# Patient Record
Sex: MALE | Race: BLACK OR AFRICAN AMERICAN | Employment: OTHER | URBAN - METROPOLITAN AREA
[De-identification: names, ages, dates, MRNs, and addresses within clinical notes are randomized per-mention and may not be internally consistent; named-entity substitution may affect disease eponyms.]

---

## 2018-03-28 ENCOUNTER — OFFICE VISIT (OUTPATIENT)
Dept: FAMILY MEDICINE CLINIC | Age: 83
End: 2018-03-28

## 2018-03-28 VITALS
SYSTOLIC BLOOD PRESSURE: 129 MMHG | HEART RATE: 69 BPM | WEIGHT: 199 LBS | RESPIRATION RATE: 17 BRPM | HEIGHT: 72 IN | BODY MASS INDEX: 26.95 KG/M2 | DIASTOLIC BLOOD PRESSURE: 78 MMHG | TEMPERATURE: 96.8 F

## 2018-03-28 DIAGNOSIS — B88.8 INFESTATION BY BED BUG: ICD-10-CM

## 2018-03-28 DIAGNOSIS — I10 ESSENTIAL HYPERTENSION: ICD-10-CM

## 2018-03-28 DIAGNOSIS — R60.0 BILATERAL LOWER EXTREMITY EDEMA: Primary | ICD-10-CM

## 2018-03-28 DIAGNOSIS — K21.9 GASTROESOPHAGEAL REFLUX DISEASE, ESOPHAGITIS PRESENCE NOT SPECIFIED: ICD-10-CM

## 2018-03-28 DIAGNOSIS — E78.5 HYPERLIPIDEMIA, UNSPECIFIED HYPERLIPIDEMIA TYPE: ICD-10-CM

## 2018-03-28 DIAGNOSIS — E03.9 HYPOTHYROIDISM, UNSPECIFIED TYPE: ICD-10-CM

## 2018-03-28 DIAGNOSIS — J44.9 CHRONIC OBSTRUCTIVE PULMONARY DISEASE, UNSPECIFIED COPD TYPE (HCC): ICD-10-CM

## 2018-03-28 DIAGNOSIS — N40.1 BENIGN PROSTATIC HYPERPLASIA WITH URINARY FREQUENCY: ICD-10-CM

## 2018-03-28 DIAGNOSIS — R35.0 BENIGN PROSTATIC HYPERPLASIA WITH URINARY FREQUENCY: ICD-10-CM

## 2018-03-28 RX ORDER — ATORVASTATIN CALCIUM 40 MG/1
TABLET, FILM COATED ORAL DAILY
COMMUNITY
End: 2018-03-28 | Stop reason: SDUPTHER

## 2018-03-28 RX ORDER — ATENOLOL 25 MG/1
25 TABLET ORAL DAILY
Qty: 90 TAB | Refills: 1 | Status: SHIPPED | OUTPATIENT
Start: 2018-03-28 | End: 2018-06-21 | Stop reason: SDUPTHER

## 2018-03-28 RX ORDER — FINASTERIDE 5 MG/1
5 TABLET, FILM COATED ORAL DAILY
Qty: 90 TAB | Refills: 1 | Status: SHIPPED | OUTPATIENT
Start: 2018-03-28 | End: 2018-06-21 | Stop reason: SDUPTHER

## 2018-03-28 RX ORDER — ATORVASTATIN CALCIUM 40 MG/1
40 TABLET, FILM COATED ORAL DAILY
Qty: 90 TAB | Refills: 1 | Status: SHIPPED | OUTPATIENT
Start: 2018-03-28 | End: 2018-10-11 | Stop reason: SDUPTHER

## 2018-03-28 RX ORDER — IPRATROPIUM BROMIDE AND ALBUTEROL SULFATE 2.5; .5 MG/3ML; MG/3ML
3 SOLUTION RESPIRATORY (INHALATION)
COMMUNITY
End: 2018-03-28 | Stop reason: SDUPTHER

## 2018-03-28 RX ORDER — FLUTICASONE PROPIONATE AND SALMETEROL 500; 50 UG/1; UG/1
1 POWDER RESPIRATORY (INHALATION) EVERY 12 HOURS
Qty: 60 EACH | Refills: 3 | Status: SHIPPED | OUTPATIENT
Start: 2018-03-28 | End: 2021-08-30 | Stop reason: SDUPTHER

## 2018-03-28 RX ORDER — OMEPRAZOLE 20 MG/1
20 CAPSULE, DELAYED RELEASE ORAL DAILY
Qty: 90 CAP | Refills: 1 | Status: SHIPPED | OUTPATIENT
Start: 2018-03-28 | End: 2018-09-19 | Stop reason: SDUPTHER

## 2018-03-28 RX ORDER — IPRATROPIUM BROMIDE AND ALBUTEROL SULFATE 2.5; .5 MG/3ML; MG/3ML
3 SOLUTION RESPIRATORY (INHALATION)
Qty: 30 NEBULE | Refills: 12 | Status: SHIPPED | OUTPATIENT
Start: 2018-03-28 | End: 2019-05-16 | Stop reason: SDUPTHER

## 2018-03-28 RX ORDER — ACETAMINOPHEN 500 MG
1000 TABLET ORAL
Qty: 90 TAB | Refills: 1 | Status: SHIPPED | OUTPATIENT
Start: 2018-03-28 | End: 2018-05-29 | Stop reason: SDUPTHER

## 2018-03-28 RX ORDER — WARFARIN 2 MG/1
6 TABLET ORAL DAILY
COMMUNITY
End: 2018-05-02 | Stop reason: SDUPTHER

## 2018-03-28 RX ORDER — LEVOTHYROXINE SODIUM 50 UG/1
50 TABLET ORAL
Qty: 90 TAB | Refills: 1 | Status: SHIPPED | OUTPATIENT
Start: 2018-03-28 | End: 2018-06-21 | Stop reason: SDUPTHER

## 2018-03-28 RX ORDER — ATENOLOL 25 MG/1
25 TABLET ORAL DAILY
COMMUNITY
End: 2018-03-28 | Stop reason: SDUPTHER

## 2018-03-28 NOTE — PROGRESS NOTES
[facility]    CC: LE edema    HPI:   History complicated by patient being a poor historian. Patient reports that normally his daughter comes with him, as he has difficulty remembering things. LE Edema:  - Timing/onset: Started around 4-5 years  - Duration: Constant  - Location: Both feet and ankles  - Associated Symptoms/signs: Numbness in feet  - Notable pertinent PMH of COPD and DVT    Bed Bugs:  - Patientdenied having bed bugs at his house  - Surprised to find out he had some currently on him  - Stated that he could not see them  - Tape used to pick one up and showed it to him so he could get a better look  - Denies any bites or lesions. Athough complains of pain at multiple locations (Neck, back, leg, and foot)    ROS: Positive items marked in RED  CON: fever, chills  Eyes: eye pain, glasses  ENT: HA, ear pain, sore throat  Cardiovascular: palpitations, CP  Resp: cough, SOB  GI: nausea, vomiting, diarrhea  SKIN: rash, itching  : dysuria, hematuria  MS: arthralgias, myalgias  Neuro: numbness (feet),  weakness  Psych: depression, anxiety      Past Medical History:   Diagnosis Date    BPH (benign prostatic hyperplasia)     Chronic obstructive pulmonary disease (HCC)     Hyperlipemia     Hypertension     IBS (irritable bowel syndrome)     Thromboembolus (HCC)     lower extremity    Thyroid disease        History reviewed. No pertinent surgical history. History reviewed. No pertinent family history.     Social History     Social History    Marital status:      Spouse name: N/A    Number of children: N/A    Years of education: N/A     Social History Main Topics    Smoking status: Former Smoker    Smokeless tobacco: Never Used    Alcohol use No    Drug use: No    Sexual activity: Not Asked     Other Topics Concern    None     Social History Narrative       No Known Allergies      Current Outpatient Prescriptions:     OMEPRAZOLE PO, Take  by mouth., Disp: , Rfl:    atenolol (TENORMIN) 50 mg tablet, Take 50 mg by mouth daily. , Disp: , Rfl:     amLODIPine (NORVASC) 2.5 mg tablet, Take 2.5 mg by mouth daily. , Disp: , Rfl:     simvastatin (ZOCOR) 20 mg tablet, Take 20 mg by mouth nightly., Disp: , Rfl:     finasteride (PROSCAR) 5 mg tablet, Take 5 mg by mouth daily. , Disp: , Rfl:     levothyroxine (SYNTHROID) 50 mcg tablet, Take 50 mcg by mouth Daily (before breakfast). , Disp: , Rfl:     warfarin (COUMADIN) 5 mg tablet, Take 5 mg by mouth daily. , Disp: , Rfl:     fluticasone-salmeterol (ADVAIR) 500-50 mcg/dose diskus inhaler, Take 1 Puff by inhalation every twelve (12) hours. , Disp: , Rfl:     albuterol (PROVENTIL VENTOLIN) 2.5 mg /3 mL (0.083 %) nebulizer solution, by Nebulization route once., Disp: , Rfl:     albuterol (PROVENTIL HFA, VENTOLIN HFA, PROAIR HFA) 90 mcg/actuation inhaler, Take 2 Puffs by inhalation every four (4) hours as needed for Wheezing., Disp: , Rfl:     traMADol (ULTRAM) 50 mg tablet, Take 50 mg by mouth every six (6) hours as needed for Pain., Disp: , Rfl:     lansoprazole (PREVACID) 15 mg capsule, Take 15 mg by mouth Daily (before breakfast). , Disp: , Rfl:     Physical Exam:      /78  Pulse 69  Temp 96.8 °F (36 °C) (Oral)   Resp 17  Ht 5' 11.5\" (1.816 m)  Wt 199 lb (90.3 kg)  BMI 27.37 kg/m2    General:  WD, WN, NAD  Eyes: sclera clear bilaterally, no discharge noted, eyelids normal in appearance  HENT: NCAT, nasal turbinates, oropharynx clear, MMM  Neck: supple, no lymphadenopathy  Lungs: CTAB, Normal respiratory effort and rate  CV: RRR, no MRGs  ABD: soft, non-tender, non-distended, normal bowel sounds  Ext: 2+ pitting edema at ankles. Patient noted to have numerous bed bugs crawling on right leg. Bed bugs appeared to be originating from shoe. Skin: normal temperature, turgor, color, and texture. No obvious bites noted on his skin.   Psych: alert and oriented to person, place and time, normal affect  Neuro: Speech normal, Moving all extremities, gait normal      Assessment/Plan   Visit cut short due to bed bug infestation. Patient's medications for his chronic medical conditions were filled, as he was running out. Will need to discuss his chronic conditions at his follow up visit. Patient to return for INR check in 2 weeks. Bilateral LE edema:  - Etiology unclear.  Patient is notably a poor historian.  - Possible etiologies inclued pulmonary HTN, cor pulmonale, and heart failure  - Will start work up with CBC, NT-pro BNP, echocardiogram, and CMP  - Follow up to be determined      Bed Bug Infestation:  - Patient advised to have a professional  come to his house and treat it for bed bugs  - Patient given handout on bed bugs      Kaci Guerin MD  3/28/2018, 1:52 PM

## 2018-03-28 NOTE — MR AVS SNAPSHOT
Vinayak Frausto [place] [address] [facility] [address] [facility] [address] [id number] 
[phone] Patient: Yelena Adams MRN: [id number] Visit Information Date & Time Provider Department Dept. Phone Encounter #  
 3/28/2018  1:30 PM Mary Quevedo, [address] [place] [address] [phone] [id number] Follow-up Instructions Return in about 2 weeks (around 4/11/2018) for INR Clinic. Upcoming Health Maintenance Date Due DTaP/Tdap/Td series (1 - Tdap) 6/2/1954 ZOSTER VACCINE AGE 60> 4/2/1993 GLAUCOMA SCREENING Q2Y 6/2/1998 Pneumococcal 65+ Low/Medium Risk (1 of 2 - PCV13) 6/2/1998 Influenza Age 5 to Adult 8/1/2017 Allergies as of 3/28/2018  Review Complete On: 9/8/2016 By: Catrina Paz. Alee, RN No Known Allergies Current Immunizations  Reviewed on 3/28/2018 Name Date Influenza Vaccine 10/1/2014 12:00 AM, 9/16/2013 12:00 AM, 10/18/2012 12:00 AM  
 Pneumococcal Polysaccharide (PPSV-23) 10/18/2009 12:00 AM  
  
 Reviewed by Celi Rod LPN on 5/29/1667 at  1:46 PM  
 Reviewed by Celi Rod LPN on 4/28/5976 at  1:46 PM  
You Were Diagnosed With   
  
 Codes Comments Bilateral lower extremity edema    -  Primary ICD-10-CM: R60.0 ICD-9-CM: 758. 3 Chronic obstructive pulmonary disease, unspecified COPD type ([facility] [place] [facility].)     ICD-10-CM: J44.9 ICD-9-CM: 327 Essential hypertension     ICD-10-CM: I10 
ICD-9-CM: 401.9 Hyperlipidemia, unspecified hyperlipidemia type     ICD-10-CM: E78.5 ICD-9-CM: 272.4 Benign prostatic hyperplasia with urinary frequency     ICD-10-CM: N40.1, R35.0 ICD-9-CM: 600.01, 788.41 Hypothyroidism, unspecified type     ICD-10-CM: E03.9 ICD-9-CM: 244.9 Gastroesophageal reflux disease, esophagitis presence not specified     ICD-10-CM: K21.9 ICD-9-CM: 530.81 Vitals BP Pulse Temp Resp Height(growth percentile) Weight(growth percentile) 129/78 69 96.8 °F (36 °C) (Oral) 17 5' 11.5\" (1.816 m) 199 lb (90.3 kg) BMI Smoking Status 27.37 kg/m2 Former Smoker Vitals History BMI and BSA Data Body Mass Index Body Surface Area  
 27.37 kg/m 2 2.13 m 2 Preferred Pharmacy Pharmacy Name Phone [id number] - [place], [address] [facility] [address] [phone] Your Updated Medication List  
  
   
This list is accurate as of 3/28/18  2:49 PM.  Always use your most recent med list.  
  
  
  
  
 acetaminophen 500 mg tablet Commonly known as:  TYLENOL Take 2 Tabs by mouth every six (6) hours as needed for Pain. albuterol-ipratropium 2.5 mg-0.5 mg/3 ml Nebu Commonly known as:  DUO-NEB  
3 mL by Nebulization route every six (6) hours as needed. atenolol 25 mg tablet Commonly known as:  TENORMIN Take 1 Tab by mouth daily. atorvastatin 40 mg tablet Commonly known as:  LIPITOR Take 1 Tab by mouth daily. finasteride 5 mg tablet Commonly known as:  PROSCAR Take 1 Tab by mouth daily. fluticasone-salmeterol 500-50 mcg/dose diskus inhaler Commonly known as:  ADVAIR Take 1 Puff by inhalation every twelve (12) hours. levothyroxine 50 mcg tablet Commonly known as:  SYNTHROID Take 1 Tab by mouth Daily (before breakfast). omeprazole 20 mg capsule Commonly known as:  PRILOSEC Take 1 Cap by mouth daily. traMADol 50 mg tablet Commonly known as:  ULTRAM  
Take 50 mg by mouth every six (6) hours as needed for Pain.  
  
 warfarin 2 mg tablet Commonly known as:  COUMADIN Take 6 mg by mouth daily. Prescriptions Sent to Pharmacy Refills  
 albuterol-ipratropium (DUO-NEB) 2.5 mg-0.5 mg/3 ml nebu 12 Sig: 3 mL by Nebulization route every six (6) hours as needed. Class: Normal  
 Pharmacy: [facility], [address] [facility] [address]  #: [phone] Route: Nebulization atenolol (TENORMIN) 25 mg tablet 1 Sig: Take 1 Tab by mouth daily. Class: Normal  
 Pharmacy: [address] Ph #: [phone] Route: Oral  
 atorvastatin (LIPITOR) 40 mg tablet 1 Sig: Take 1 Tab by mouth daily. Class: Normal  
 Pharmacy: [address] Ph #: [phone] Route: Oral  
 finasteride (PROSCAR) 5 mg tablet 1 Sig: Take 1 Tab by mouth daily. Class: Normal  
 Pharmacy: [address] Ph #: [phone] Route: Oral  
 fluticasone-salmeterol (ADVAIR) 500-50 mcg/dose diskus inhaler 3 Sig: Take 1 Puff by inhalation every twelve (12) hours. Class: Normal  
 Pharmacy: [address] Ph #: [phone] Route: Inhalation  
 levothyroxine (SYNTHROID) 50 mcg tablet 1 Sig: Take 1 Tab by mouth Daily (before breakfast). Class: Normal  
 Pharmacy: [address] Ph #: [phone] Route: Oral  
 omeprazole (PRILOSEC) 20 mg capsule 1 Sig: Take 1 Cap by mouth daily. Class: Normal  
 Pharmacy: [address] Ph #: [phone] Route: Oral  
 acetaminophen (TYLENOL) 500 mg tablet 1 Sig: Take 2 Tabs by mouth every six (6) hours as needed for Pain. Class: Normal  
 Pharmacy: [address] Ph #: [phone] Route: Oral  
  
Follow-up Instructions Return in about 2 weeks (around 4/11/2018) for INR Clinic. To-Do List   
 03/28/2018 ECHO:  2D ECHO COMPLETE ADULT (TTE) W OR WO CONTR   
  
 03/28/2018 Lab:  CBC WITH AUTOMATED DIFF   
  
 03/28/2018 Lab:  METABOLIC PANEL, COMPREHENSIVE   
  
 03/28/2018 Lab:  NT-PRO BNP Patient Instructions Bedbugs: Care Instructions Your Care Instructions Bedbugs are tiny bugs that feed on blood from animals or people. They have that name because they like to hide in bedding and mattresses. Bedbugs are not known to spread disease to people. But itching from the bites can be so bad that you may scratch hard enough to break the skin. That can lead to infection. The bites can also cause an allergic reaction in some people. The bugs can spread into cracks and crevices in the room and lay their eggs in anything that is in the room, including clothing and furniture. This makes them very hard to kill. Getting rid of bedbugs The best way to get rid of bedbugs is to call a professional pest control company. They use special pesticides and other equipment to kill the bugs and their eggs. If you decide to buy your own pesticide, check the label. Make sure it says that it is for bedbugs. Be sure to follow the directions carefully. You may have to use the pesticide more than once. Do other cleaning steps such as: · Vacuum often. Be sure to empty the vacuum after each use. If you use a vacuum bag, seal it and throw it out in an outdoor trash can. If you don't use a vacuum bag, empty the container and clean it with hot, soapy water. · Launder things that might hide bugs. Washing and then drying items in a dryer on a hot setting is adequate to kill bedbugs in clothing or linens. Turn the dryer to the hottest setting that the fabric can handle. · Use mattress, box spring, and pillow (encasement) sacks to trap bed bugs and help get rid of them. Be sure to follow the directions on the package. After your mattress has been cleared of bedbugs, you can buy a special mattress cover that is made to keep bedbugs out of your mattress. Follow-up care is a key part of your treatment and safety. Be sure to make and go to all appointments, and call your doctor if you are having problems. It's also a good idea to know your test results and keep a list of the medicines you take. How can you care for yourself at home? · Wash the bites with soap to lower the chance of infection. Try not to scratch. · Use calamine lotion or an anti-itch cream to stop the itching. You can also hold an oatmeal-soaked washcloth on the itchy area for 15 minutes. You can buy an oatmeal powder, such as Aveeno Colloidal Oatmeal, in drugstores. · Use an ice pack to stop the swelling. When should you call for help? Call your doctor now or seek immediate medical care if: 
? · You have signs of infection, such as: 
¨ Increased pain, swelling, warmth, or redness. ¨ Red streaks leading from a bite area. ¨ Pus draining from a bite area. ¨ A fever. ? Watch closely for changes in your health, and be sure to contact your doctor if: 
? · Anyone else in your family has itching. ? · You do not get better as expected. Where can you learn more? Go to http://bienvenido-bee.info/. Enter G246 in the search box to learn more about \"Bedbugs: Care Instructions. \" Current as of: March 20, 2017 Content Version: 11.4 © 8363-9908 Linio. Care instructions adapted under license by [employer] (which disclaims liability or warranty for this information). If you have questions about a medical condition or this instruction, always ask your healthcare professional. [place] [address] any warranty or liability for your use of this information. Introducing [facility]! [facility] introduces Mixbook patient portal. Now you can access parts of your medical record, email your doctor's office, and request medication refills online. 1. In your internet browser, go to [email or web address]. [email or web address] 2. Click on the First Time User? Click Here link in the Sign In box. You will see the New Member Sign Up page. 3. Enter your Mixbook Access Code exactly as it appears below.  You will not need to use this code after youve completed the sign-up process. If you do not sign up before the expiration date, you must request a new code. · Anam Mobile Access Code: [email or web address] Expires: 6/26/2018  2:09 PM 
 
4. Enter the last four digits of your Social Security Number (xxxx) and Date of Birth (mm/dd/yyyy) as indicated and click Submit. You will be taken to the next sign-up page. 5. Create a Anam Mobile ID. This will be your Anam Mobile login ID and cannot be changed, so think of one that is secure and easy to remember. 6. Create a Anam Mobile password. You can change your password at any time. 7. Enter your Password Reset Question and Answer. This can be used at a later time if you forget your password. 8. Enter your e-mail address. You will receive e-mail notification when new information is available in [address]. 9. Click Sign Up. You can now view and download portions of your medical record. 10. Click the Download Summary menu link to download a portable copy of your medical information. If you have questions, please visit the Frequently Asked Questions section of the Anam Mobile website. Remember, Anam Mobile is NOT to be used for urgent needs. For medical emergencies, dial 911. Now available from your iPhone and Android! Please provide this summary of care documentation to your next provider. Your primary care clinician is listed as Michelle Watson. If you have any questions after today's visit, please call [phone].

## 2018-03-28 NOTE — PATIENT INSTRUCTIONS
Bedbugs: Care Instructions  Your Care Instructions    Bedbugs are tiny bugs that feed on blood from animals or people. They have that name because they like to hide in bedding and mattresses. Bedbugs are not known to spread disease to people. But itching from the bites can be so bad that you may scratch hard enough to break the skin. That can lead to infection. The bites can also cause an allergic reaction in some people. The bugs can spread into cracks and crevices in the room and lay their eggs in anything that is in the room, including clothing and furniture. This makes them very hard to kill. Getting rid of bedbugs  The best way to get rid of bedbugs is to call a professional pest control company. They use special pesticides and other equipment to kill the bugs and their eggs. If you decide to buy your own pesticide, check the label. Make sure it says that it is for bedbugs. Be sure to follow the directions carefully. You may have to use the pesticide more than once. Do other cleaning steps such as:  · Vacuum often. Be sure to empty the vacuum after each use. If you use a vacuum bag, seal it and throw it out in an outdoor trash can. If you don't use a vacuum bag, empty the container and clean it with hot, soapy water. · Launder things that might hide bugs. Washing and then drying items in a dryer on a hot setting is adequate to kill bedbugs in clothing or linens. Turn the dryer to the hottest setting that the fabric can handle. · Use mattress, box spring, and pillow (encasement) sacks to trap bed bugs and help get rid of them. Be sure to follow the directions on the package. After your mattress has been cleared of bedbugs, you can buy a special mattress cover that is made to keep bedbugs out of your mattress. Follow-up care is a key part of your treatment and safety. Be sure to make and go to all appointments, and call your doctor if you are having problems.  It's also a good idea to know your test results and keep a list of the medicines you take. How can you care for yourself at home? · Wash the bites with soap to lower the chance of infection. Try not to scratch. · Use calamine lotion or an anti-itch cream to stop the itching. You can also hold an oatmeal-soaked washcloth on the itchy area for 15 minutes. You can buy an oatmeal powder, such as Aveeno Colloidal Oatmeal, in drugstores. · Use an ice pack to stop the swelling. When should you call for help? Call your doctor now or seek immediate medical care if:  ? · You have signs of infection, such as:  ¨ Increased pain, swelling, warmth, or redness. ¨ Red streaks leading from a bite area. ¨ Pus draining from a bite area. ¨ A fever. ? Watch closely for changes in your health, and be sure to contact your doctor if:  ? · Anyone else in your family has itching. ? · You do not get better as expected. Where can you learn more? Go to http://bienvenido-bee.info/. Enter G246 in the search box to learn more about \"Bedbugs: Care Instructions. \"  Current as of: March 20, 2017  Content Version: 11.4  © 6182-8447 [employer]. Care instructions adapted under license by [employer] (which disclaims liability or warranty for this information). If you have questions about a medical condition or this instruction, always ask your healthcare professional. [place] [address] any warranty or liability for your use of this information.

## 2018-04-12 ENCOUNTER — CLINICAL SUPPORT (OUTPATIENT)
Dept: FAMILY MEDICINE CLINIC | Age: 83
End: 2018-04-12

## 2018-04-12 ENCOUNTER — TELEPHONE (OUTPATIENT)
Dept: FAMILY MEDICINE CLINIC | Age: 83
End: 2018-04-12

## 2018-04-12 DIAGNOSIS — Z79.01 LONG TERM (CURRENT) USE OF ANTICOAGULANTS: Primary | ICD-10-CM

## 2018-04-12 DIAGNOSIS — Z86.718 HISTORY OF DVT OF LOWER EXTREMITY: ICD-10-CM

## 2018-04-12 LAB
INR BLD: 1.4
PT POC: 17.3 SECONDS
VALID INTERNAL CONTROL?: YES

## 2018-04-12 NOTE — PATIENT INSTRUCTIONS
Bedbugs: Care Instructions  Your Care Instructions    Bedbugs are tiny bugs that feed on blood from animals or people. They have that name because they like to hide in bedding and mattresses. Bedbugs are not known to spread disease to people. But itching from the bites can be so bad that you may scratch hard enough to break the skin. That can lead to infection. The bites can also cause an allergic reaction in some people. The bugs can spread into cracks and crevices in the room and lay their eggs in anything that is in the room, including clothing and furniture. This makes them very hard to kill. Getting rid of bedbugs  The best way to get rid of bedbugs is to call a professional pest control company. They use special pesticides and other equipment to kill the bugs and their eggs. If you decide to buy your own pesticide, check the label. Make sure it says that it is for bedbugs. Be sure to follow the directions carefully. You may have to use the pesticide more than once. Do other cleaning steps such as:  · Vacuum often. Be sure to empty the vacuum after each use. If you use a vacuum bag, seal it and throw it out in an outdoor trash can. If you don't use a vacuum bag, empty the container and clean it with hot, soapy water. · Launder things that might hide bugs. Washing and then drying items in a dryer on a hot setting is adequate to kill bedbugs in clothing or linens. Turn the dryer to the hottest setting that the fabric can handle. · Use mattress, box spring, and pillow (encasement) sacks to trap bed bugs and help get rid of them. Be sure to follow the directions on the package. After your mattress has been cleared of bedbugs, you can buy a special mattress cover that is made to keep bedbugs out of your mattress. Follow-up care is a key part of your treatment and safety. Be sure to make and go to all appointments, and call your doctor if you are having problems.  It's also a good idea to know your test results and keep a list of the medicines you take. How can you care for yourself at home? · Wash the bites with soap to lower the chance of infection. Try not to scratch. · Use calamine lotion or an anti-itch cream to stop the itching. You can also hold an oatmeal-soaked washcloth on the itchy area for 15 minutes. You can buy an oatmeal powder, such as Aveeno Colloidal Oatmeal, in drugstores. · Use an ice pack to stop the swelling. When should you call for help? Call your doctor now or seek immediate medical care if:  ? · You have signs of infection, such as:  ¨ Increased pain, swelling, warmth, or redness. ¨ Red streaks leading from a bite area. ¨ Pus draining from a bite area. ¨ A fever. ? Watch closely for changes in your health, and be sure to contact your doctor if:  ? · Anyone else in your family has itching. ? · You do not get better as expected. Where can you learn more? Go to http://bienvenido-bee.info/. Enter G246 in the search box to learn more about \"Bedbugs: Care Instructions. \"  Current as of: March 20, 2017  Content Version: 11.4  © 5189-1216 [employer]. Care instructions adapted under license by [employer] (which disclaims liability or warranty for this information). If you have questions about a medical condition or this instruction, always ask your healthcare professional. [place] [address] any warranty or liability for your use of this information.

## 2018-04-12 NOTE — PROGRESS NOTES
Cornell Byrd is a 80 y.o. male who presents today for Anticoagulation monitoring. Indication: DVT  INR Goal: 2.0-3.0. Current dose:  Coumadin 5mg daily. Missed Coumadin Doses:  None  Medication Changes:  no  Dietary Changes:  no    Symptoms: taking coumadin appropriately without any bleeding. Latest INRs:  Lab Results   Component Value Date/Time    INR 1.3 (H) 03/05/2010 04:25 AM    INR 1.2 03/04/2010 04:45 AM    INR 1.2 03/03/2010 06:15 AM    Prothrombin time 15.9 (H) 03/05/2010 04:25 AM    Prothrombin time 15.2 03/04/2010 04:45 AM    Prothrombin time 15.3 (H) 03/03/2010 06:15 AM        New Coumadin dose:. No changes is made. Per pcp, patient was counseled on extermination of bed bugs and to call the clinic to let us know his current medications. Patient is unaware of his current medications so no changes will be done at this visit. Provider is aware of INR level today.

## 2018-04-12 NOTE — MR AVS SNAPSHOT
Vinayak Frausto [place] [address] [facility] [place]. [address] [facility] 83 [id number] 
[phone] Patient: Summer Cruz MRN: [id number] CQN:[id number] Visit Information Date & Time Provider Department Dept. Phone Encounter #  
 4/12/2018  1:15 PM Hector Cote, [address] [facility] [phone] [id number] Upcoming Health Maintenance Date Due DTaP/Tdap/Td series (1 - Tdap) [birth year]2/1954 ZOSTER VACCINE AGE 60> 4/2/1993 GLAUCOMA SCREENING Q2Y 6/2/1998 Pneumococcal 65+ Low/Medium Risk (2 of 2 - PCV13) 10/18/2010 Influenza Age 5 to Adult 8/1/2017 Allergies as of 4/12/2018  Review Complete On: 9/8/2016 By: Simran Mathias. Alee, RN No Known Allergies Current Immunizations  Reviewed on 3/28/2018 Name Date Influenza Vaccine 10/1/2014 12:00 AM, 9/16/2013 12:00 AM, 10/18/2012 12:00 AM  
 Pneumococcal Polysaccharide (PPSV-23) 10/18/2009 12:00 AM  
  
 Not reviewed this visit You Were Diagnosed With   
  
 Codes Comments [facility]    -  Primary ICD-10-CM: D69.1 ICD-9-CM: 287.1 Vitals Smoking Status Former Smoker Your Updated Medication List  
  
   
This list is accurate as of 4/12/18  2:04 PM.  Always use your most recent med list.  
  
  
  
  
 acetaminophen 500 mg tablet Commonly known as:  TYLENOL Take 2 Tabs by mouth every six (6) hours as needed for Pain. albuterol-ipratropium 2.5 mg-0.5 mg/3 ml Nebu Commonly known as:  DUO-NEB  
3 mL by Nebulization route every six (6) hours as needed. atenolol 25 mg tablet Commonly known as:  TENORMIN Take 1 Tab by mouth daily. atorvastatin 40 mg tablet Commonly known as:  LIPITOR Take 1 Tab by mouth daily. finasteride 5 mg tablet Commonly known as:  PROSCAR Take 1 Tab by mouth daily. fluticasone-salmeterol 500-50 mcg/dose diskus inhaler Commonly known as:  ADVAIR  
 Take 1 Puff by inhalation every twelve (12) hours. levothyroxine 50 mcg tablet Commonly known as:  SYNTHROID Take 1 Tab by mouth Daily (before breakfast). omeprazole 20 mg capsule Commonly known as:  PRILOSEC Take 1 Cap by mouth daily. traMADol 50 mg tablet Commonly known as:  ULTRAM  
Take 50 mg by mouth every six (6) hours as needed for Pain.  
  
 warfarin 2 mg tablet Commonly known as:  COUMADIN Take 6 mg by mouth daily. We Performed the Following AMB POC PT/INR [38842 CPT(R)] To-Do List   
 04/13/2018 11:30 AM  
  Appointment with [facility] [address] [facility] CV SERV at [facility] NON-INVASIVE [facility] ([phone]) Patient needs to bring a current list of all medications  No preparation is required for this study  This study requires patient to bring a written physicians order or the MD office may fax the order to Central Scheduling at [phone]. This exam is performed in the [address] [place] [address] at [facility] in the echo department. Please have the patient arrive 15 minutes prior to their schedule appointment time and report to Patient Registration at the main entrance of the hospital.     AGE LIMIT for this procedure at [facility] is 25years old. All patients under 25years of age should be referred to [facility]. Patient Instructions Bedbugs: Care Instructions Your Care Instructions Bedbugs are tiny bugs that feed on blood from animals or people. They have that name because they like to hide in bedding and mattresses. Bedbugs are not known to spread disease to people. But itching from the bites can be so bad that you may scratch hard enough to break the skin. That can lead to infection. The bites can also cause an allergic reaction in some people. The bugs can spread into cracks and crevices in the room and lay their eggs in anything that is in the room, including clothing and furniture. This makes them very hard to kill. Getting rid of bedbugs The best way to get rid of bedbugs is to call a professional pest control company. They use special pesticides and other equipment to kill the bugs and their eggs. If you decide to buy your own pesticide, check the label. Make sure it says that it is for bedbugs. Be sure to follow the directions carefully. You may have to use the pesticide more than once. Do other cleaning steps such as: · Vacuum often. Be sure to empty the vacuum after each use. If you use a vacuum bag, seal it and throw it out in an outdoor trash can. If you don't use a vacuum bag, empty the container and clean it with hot, soapy water. · Launder things that might hide bugs. Washing and then drying items in a dryer on a hot setting is adequate to kill bedbugs in clothing or linens. Turn the dryer to the hottest setting that the fabric can handle. · Use mattress, box spring, and pillow (encasement) sacks to trap bed bugs and help get rid of them. Be sure to follow the directions on the package. After your mattress has been cleared of bedbugs, you can buy a special mattress cover that is made to keep bedbugs out of your mattress. Follow-up care is a key part of your treatment and safety. Be sure to make and go to all appointments, and call your doctor if you are having problems. It's also a good idea to know your test results and keep a list of the medicines you take. How can you care for yourself at home? · Wash the bites with soap to lower the chance of infection. Try not to scratch. · Use calamine lotion or an anti-itch cream to stop the itching. You can also hold an oatmeal-soaked washcloth on the itchy area for 15 minutes. You can buy an oatmeal powder, such as Aveeno Colloidal Oatmeal, in drugstores. · Use an ice pack to stop the swelling. When should you call for help? Call your doctor now or seek immediate medical care if: 
? · You have signs of infection, such as: ¨ Increased pain, swelling, warmth, or redness. ¨ Red streaks leading from a bite area. ¨ Pus draining from a bite area. ¨ A fever. ? Watch closely for changes in your health, and be sure to contact your doctor if: 
? · Anyone else in your family has itching. ? · You do not get better as expected. Where can you learn more? Go to http://bienvenido-bee.info/. Enter G246 in the search box to learn more about \"Bedbugs: Care Instructions. \" Current as of: March 20, 2017 Content Version: 11.4 © 5177-4590 [employer]. Care instructions adapted under license by "[employer] (which disclaims liability or warranty for this information). If you have questions about a medical condition or this instruction, always ask your healthcare professional. [address] any warranty or liability for your use of this information. Patient Instructions History Introducing \[facility] [place] [facility]\"" & HEALTH SERVICES! Cherry Hernandez introduces [employer] patient portal. Now you can access parts of your medical record, email your doctor's office, and request medication refills online. 1. In your internet browser, go to [email or web address] 2. Click on the First Time User? Click Here link in the Sign In box. You will see the New Member Sign Up page. 3. Enter your [employer] Access Code exactly as it appears below. You will not need to use this code after youve completed the sign-up process. If you do not sign up before the expiration date, you must request a new code. · [employer] Access Code: [email or web address] Expires: 6/26/2018  2:09 PM 
 
4. Enter the last four digits of your Social Security Number (xxxx) and Date of Birth (mm/dd/yyyy) as indicated and click Submit. You will be taken to the next sign-up page. 5. Create a [employer] ID. This will be your [employer] login ID and cannot be changed, so think of one that is secure and easy to remember. 6. Create a [employer] password. You can change your password at any time. 7. Enter your Password Reset Question and Answer. This can be used at a later time if you forget your password. 8. Enter your e-mail address. You will receive e-mail notification when new information is available in [address] 9. Click Sign Up. You can now view and download portions of your medical record. 10. Click the Download Summary menu link to download a portable copy of your medical information. If you have questions, please visit the Frequently Asked Questions section of the [employer] website. Remember, [employer] is NOT to be used for urgent needs. For medical emergencies, dial 911. Now available from your iPhone and Android! Please provide this summary of care documentation to your next provider. Your primary care clinician is listed as Muna Sotomayor. If you have any questions after today's visit, please call [phone].

## 2018-04-13 NOTE — PROGRESS NOTES
Patient does not know what his current warfarin regimen is. Patient advised to go home and verify what he has been taking and to call the clinic ASAP, so we can adjust his medication.

## 2018-04-16 NOTE — TELEPHONE ENCOUNTER
Verified patient by full name and date of birth. Notified patient of provider recommendations. Patient repeated back his new medication dosage with understanding. Patient states he is unsure if he can come in 1 week due to his ride situation but he will try. Patient states he will make his appointment after he has set up his ride. Patient verbalized understanding without any further questions or concerns.

## 2018-04-26 ENCOUNTER — CLINICAL SUPPORT (OUTPATIENT)
Dept: FAMILY MEDICINE CLINIC | Age: 83
End: 2018-04-26

## 2018-04-26 DIAGNOSIS — Z86.718 HISTORY OF DVT (DEEP VEIN THROMBOSIS): Primary | ICD-10-CM

## 2018-04-26 LAB
INR BLD: 1.4
PT POC: NORMAL SECONDS
VALID INTERNAL CONTROL?: YES

## 2018-04-26 NOTE — PROGRESS NOTES
Summer Cruz is a 80 y.o. male who presents today for Anticoagulation monitoring. Indication: History of DVT  INR Goal: 2.0-3.0  Current dose:  Coumadin 7.5 mg daily. Missed Coumadin Doses:  None  Medication Changes: none  Dietary Changes:  None    Symptoms: taking coumadin appropriately     Latest INRs:  Lab Results   Component Value Date/Time    INR 1.3 (H) 03/05/2010 04:25 AM    INR 1.2 03/04/2010 04:45 AM    INR 1.2 03/03/2010 06:15 AM    INR POC 1.4 04/26/2018 11:26 AM    INR POC 1.4 04/12/2018 01:38 PM    Prothrombin time 15.9 (H) 03/05/2010 04:25 AM    Prothrombin time 15.2 03/04/2010 04:45 AM    Prothrombin time 15.3 (H) 03/03/2010 06:15 AM        New Coumadin dose: Increase Coumadin to 8mg daily    Next check to be scheduled for 2 weeks.

## 2018-05-02 DIAGNOSIS — I10 ESSENTIAL HYPERTENSION: Primary | ICD-10-CM

## 2018-05-02 DIAGNOSIS — Z79.01 LONG TERM (CURRENT) USE OF ANTICOAGULANTS: ICD-10-CM

## 2018-05-02 RX ORDER — WARFARIN 2 MG/1
8 TABLET ORAL DAILY
Qty: 120 TAB | Refills: 3 | Status: SHIPPED | OUTPATIENT
Start: 2018-05-02 | End: 2018-08-20 | Stop reason: SDUPTHER

## 2018-05-02 RX ORDER — AMLODIPINE BESYLATE 10 MG/1
5 TABLET ORAL DAILY
Qty: 90 TAB | Refills: 1 | Status: SHIPPED | OUTPATIENT
Start: 2018-05-02 | End: 2019-04-27 | Stop reason: SDUPTHER

## 2018-05-10 ENCOUNTER — OFFICE VISIT (OUTPATIENT)
Dept: FAMILY MEDICINE CLINIC | Age: 83
End: 2018-05-10

## 2018-05-10 DIAGNOSIS — Z86.718 HISTORY OF DVT (DEEP VEIN THROMBOSIS): Primary | ICD-10-CM

## 2018-05-10 LAB
INR BLD: 4.4
PT POC: 52.8 SECONDS
VALID INTERNAL CONTROL?: YES

## 2018-05-10 NOTE — PROGRESS NOTES
John Santos is a 80 y.o. male who presents today for Anticoagulation monitoring. Indication: DVT  INR Goal: 2.0-3.0. Current dose:  Coumadin 8mg daily. Missed Coumadin Doses:  None  Medication Changes:  yes - Prednisone dose pack  Dietary Changes:  no    Symptoms: taking coumadin appropriately without any bleeding. Latest INRs:  Lab Results   Component Value Date/Time    INR 1.3 (H) 03/05/2010 04:25 AM    INR 1.2 03/04/2010 04:45 AM    INR 1.2 03/03/2010 06:15 AM    INR POC 1.4 04/26/2018 11:26 AM    INR POC 1.4 04/12/2018 01:38 PM    Prothrombin time 15.9 (H) 03/05/2010 04:25 AM    Prothrombin time 15.2 03/04/2010 04:45 AM    Prothrombin time 15.3 (H) 03/03/2010 06:15 AM        New Coumadin dose:.current treatment plan is effective, no change in therapy, the following changes are made - hold 1 dose and started 3.5 tablets a day to equal 7mg. Next check to be scheduled for  2 weeks.

## 2018-05-24 ENCOUNTER — OFFICE VISIT (OUTPATIENT)
Dept: FAMILY MEDICINE CLINIC | Age: 83
End: 2018-05-24

## 2018-05-24 VITALS
OXYGEN SATURATION: 95 % | TEMPERATURE: 98.3 F | HEART RATE: 81 BPM | RESPIRATION RATE: 16 BRPM | SYSTOLIC BLOOD PRESSURE: 134 MMHG | DIASTOLIC BLOOD PRESSURE: 79 MMHG

## 2018-05-24 DIAGNOSIS — Z79.01 LONG TERM (CURRENT) USE OF ANTICOAGULANTS: ICD-10-CM

## 2018-05-24 DIAGNOSIS — Z86.718 HISTORY OF DVT (DEEP VEIN THROMBOSIS): Primary | ICD-10-CM

## 2018-05-24 LAB
INR BLD: 2.7
PT POC: 32.9 SECONDS
VALID INTERNAL CONTROL?: YES

## 2018-05-24 NOTE — PROGRESS NOTES
Michell Dorsey is a 80 y.o. male who presents today for Anticoagulation monitoring. Indication: DVT  INR Goal: 2.0-3.0. Current dose:  Coumadin 7 mg daily. Missed Coumadin Doses:  None  Medication Changes:  no  Dietary Changes:  no    Symptoms: taking coumadin appropriately without any bleeding. Latest INRs:  Lab Results   Component Value Date/Time    INR 1.3 (H) 03/05/2010 04:25 AM    INR 1.2 03/04/2010 04:45 AM    INR 1.2 03/03/2010 06:15 AM    INR POC 4.4 05/10/2018 11:00 AM    INR POC 1.4 04/26/2018 11:26 AM    INR POC 1.4 04/12/2018 01:38 PM    Prothrombin time 15.9 (H) 03/05/2010 04:25 AM    Prothrombin time 15.2 03/04/2010 04:45 AM    Prothrombin time 15.3 (H) 03/03/2010 06:15 AM        New Coumadin dose:.current treatment plan is effective, no change in therapy. Next check to be scheduled for  2 weeks.

## 2018-05-29 DIAGNOSIS — R52 PAIN: ICD-10-CM

## 2018-05-29 DIAGNOSIS — R52 PAIN: Primary | ICD-10-CM

## 2018-05-29 RX ORDER — ACETAMINOPHEN 500 MG
1000 TABLET ORAL
Qty: 90 TAB | Refills: 1 | Status: SHIPPED | OUTPATIENT
Start: 2018-05-29 | End: 2018-05-29 | Stop reason: SDUPTHER

## 2018-05-30 RX ORDER — ACETAMINOPHEN 500 MG
1000 TABLET ORAL
Qty: 90 TAB | Refills: 1 | Status: SHIPPED | OUTPATIENT
Start: 2018-05-30 | End: 2018-06-21 | Stop reason: SDUPTHER

## 2018-06-07 ENCOUNTER — CLINICAL SUPPORT (OUTPATIENT)
Dept: FAMILY MEDICINE CLINIC | Age: 83
End: 2018-06-07

## 2018-06-07 DIAGNOSIS — Z86.718 HISTORY OF DVT (DEEP VEIN THROMBOSIS): Primary | ICD-10-CM

## 2018-06-07 LAB
INR BLD: 2.7
PT POC: NORMAL SECONDS
VALID INTERNAL CONTROL?: YES

## 2018-06-21 ENCOUNTER — CLINICAL SUPPORT (OUTPATIENT)
Dept: FAMILY MEDICINE CLINIC | Age: 83
End: 2018-06-21

## 2018-06-21 ENCOUNTER — HOSPITAL ENCOUNTER (OUTPATIENT)
Dept: LAB | Age: 83
Discharge: HOME | End: 2018-06-21
Payer: COMMERCIAL

## 2018-06-21 VITALS — HEART RATE: 72 BPM | OXYGEN SATURATION: 96 % | DIASTOLIC BLOOD PRESSURE: 83 MMHG | SYSTOLIC BLOOD PRESSURE: 154 MMHG

## 2018-06-21 DIAGNOSIS — I10 ESSENTIAL HYPERTENSION: ICD-10-CM

## 2018-06-21 DIAGNOSIS — N40.1 BENIGN PROSTATIC HYPERPLASIA WITH URINARY FREQUENCY: ICD-10-CM

## 2018-06-21 DIAGNOSIS — R52 PAIN: ICD-10-CM

## 2018-06-21 DIAGNOSIS — E03.9 HYPOTHYROIDISM, UNSPECIFIED TYPE: Primary | ICD-10-CM

## 2018-06-21 DIAGNOSIS — E03.9 HYPOTHYROIDISM, UNSPECIFIED TYPE: ICD-10-CM

## 2018-06-21 DIAGNOSIS — E78.5 HYPERLIPIDEMIA, UNSPECIFIED HYPERLIPIDEMIA TYPE: ICD-10-CM

## 2018-06-21 DIAGNOSIS — R35.0 BENIGN PROSTATIC HYPERPLASIA WITH URINARY FREQUENCY: ICD-10-CM

## 2018-06-21 LAB
ALBUMIN SERPL-MCNC: 3.4 G/DL (ref 3.4–5)
ALBUMIN/GLOB SERPL: 1 {RATIO} (ref 0.8–1.7)
ALP SERPL-CCNC: 77 U/L (ref 45–117)
ALT SERPL-CCNC: 22 U/L (ref 16–61)
ANION GAP SERPL CALC-SCNC: 10 MMOL/L (ref 3–18)
AST SERPL-CCNC: 21 U/L (ref 15–37)
BASOPHILS # BLD: 0 K/UL (ref 0–0.06)
BASOPHILS NFR BLD: 0 % (ref 0–2)
BILIRUB SERPL-MCNC: 0.5 MG/DL (ref 0.2–1)
BUN SERPL-MCNC: 13 MG/DL (ref 7–18)
BUN/CREAT SERPL: 12 (ref 12–20)
CALCIUM SERPL-MCNC: 8.4 MG/DL (ref 8.5–10.1)
CHLORIDE SERPL-SCNC: 113 MMOL/L (ref 100–108)
CHOLEST SERPL-MCNC: 138 MG/DL
CO2 SERPL-SCNC: 24 MMOL/L (ref 21–32)
CREAT SERPL-MCNC: 1.12 MG/DL (ref 0.6–1.3)
DIFFERENTIAL METHOD BLD: ABNORMAL
EOSINOPHIL # BLD: 0.3 K/UL (ref 0–0.4)
EOSINOPHIL NFR BLD: 10 % (ref 0–5)
ERYTHROCYTE [DISTWIDTH] IN BLOOD BY AUTOMATED COUNT: 14.9 % (ref 11.6–14.5)
GLOBULIN SER CALC-MCNC: 3.4 G/DL (ref 2–4)
GLUCOSE SERPL-MCNC: 97 MG/DL (ref 74–99)
HCT VFR BLD AUTO: 33.4 % (ref 36–48)
HDLC SERPL-MCNC: 59 MG/DL (ref 40–60)
HGB BLD-MCNC: 10.9 G/DL (ref 13–16)
LYMPHOCYTES # BLD: 0.7 K/UL (ref 0.9–3.6)
LYMPHOCYTES NFR BLD: 24 % (ref 21–52)
MCH RBC QN AUTO: 35.2 PG (ref 24–34)
MCHC RBC AUTO-ENTMCNC: 32.6 G/DL (ref 31–37)
MCV RBC AUTO: 107.7 FL (ref 74–97)
MONOCYTES # BLD: 0.5 K/UL (ref 0.05–1.2)
MONOCYTES NFR BLD: 15 % (ref 3–10)
NEUTS SEG # BLD: 1.6 K/UL (ref 1.8–8)
NEUTS SEG NFR BLD: 51 % (ref 40–73)
PLATELET # BLD AUTO: 146 K/UL (ref 135–420)
PMV BLD AUTO: 10.6 FL (ref 9.2–11.8)
POTASSIUM SERPL-SCNC: 4.2 MMOL/L (ref 3.5–5.5)
PROT SERPL-MCNC: 6.8 G/DL (ref 6.4–8.2)
RBC # BLD AUTO: 3.1 M/UL (ref 4.7–5.5)
SODIUM SERPL-SCNC: 147 MMOL/L (ref 136–145)
TSH SERPL DL<=0.05 MIU/L-ACNC: 1.84 UIU/ML (ref 0.36–3.74)
WBC # BLD AUTO: 3.2 K/UL (ref 4.6–13.2)

## 2018-06-21 PROCEDURE — 84443 ASSAY THYROID STIM HORMONE: CPT | Performed by: FAMILY MEDICINE

## 2018-06-21 PROCEDURE — 36415 COLL VENOUS BLD VENIPUNCTURE: CPT | Performed by: FAMILY MEDICINE

## 2018-06-21 PROCEDURE — 83718 ASSAY OF LIPOPROTEIN: CPT | Performed by: FAMILY MEDICINE

## 2018-06-21 PROCEDURE — 83721 ASSAY OF BLOOD LIPOPROTEIN: CPT | Performed by: FAMILY MEDICINE

## 2018-06-21 PROCEDURE — 80053 COMPREHEN METABOLIC PANEL: CPT | Performed by: FAMILY MEDICINE

## 2018-06-21 PROCEDURE — 82465 ASSAY BLD/SERUM CHOLESTEROL: CPT | Performed by: FAMILY MEDICINE

## 2018-06-21 PROCEDURE — 85025 COMPLETE CBC W/AUTO DIFF WBC: CPT | Performed by: FAMILY MEDICINE

## 2018-06-21 RX ORDER — LEVOTHYROXINE SODIUM 50 UG/1
50 TABLET ORAL
Qty: 90 TAB | Refills: 1 | Status: SHIPPED | OUTPATIENT
Start: 2018-06-21 | End: 2018-09-27 | Stop reason: SDUPTHER

## 2018-06-21 RX ORDER — FINASTERIDE 5 MG/1
5 TABLET, FILM COATED ORAL DAILY
Qty: 90 TAB | Refills: 1 | Status: SHIPPED | OUTPATIENT
Start: 2018-06-21 | End: 2018-09-27 | Stop reason: SDUPTHER

## 2018-06-21 RX ORDER — ATENOLOL 25 MG/1
25 TABLET ORAL DAILY
Qty: 90 TAB | Refills: 1 | Status: SHIPPED | OUTPATIENT
Start: 2018-06-21 | End: 2018-09-27 | Stop reason: SDUPTHER

## 2018-06-21 RX ORDER — ACETAMINOPHEN 500 MG
1000 TABLET ORAL
Qty: 90 TAB | Refills: 1 | Status: SHIPPED | OUTPATIENT
Start: 2018-06-21 | End: 2018-07-20 | Stop reason: SDUPTHER

## 2018-06-21 NOTE — PROGRESS NOTES
Patient in office for New Alexa. Patient was not originally scheduled to come in today. Patient was not supposed to be seen until f/u in July. Visit Vitals    /83 (BP 1 Location: Right arm, BP Patient Position: Sitting)    Pulse 72    SpO2 96%     Patient request medication refills.

## 2018-06-23 LAB — LDLC SERPL DIRECT ASSAY-MCNC: 75 MG/DL (ref 0–99)

## 2018-07-05 ENCOUNTER — HOSPITAL ENCOUNTER (OUTPATIENT)
Dept: LAB | Age: 83
Discharge: HOME | End: 2018-07-05
Payer: COMMERCIAL

## 2018-07-05 ENCOUNTER — OFFICE VISIT (OUTPATIENT)
Dept: FAMILY MEDICINE CLINIC | Age: 83
End: 2018-07-05

## 2018-07-05 VITALS
TEMPERATURE: 99.3 F | HEIGHT: 72 IN | OXYGEN SATURATION: 100 % | RESPIRATION RATE: 16 BRPM | HEART RATE: 83 BPM | DIASTOLIC BLOOD PRESSURE: 80 MMHG | SYSTOLIC BLOOD PRESSURE: 129 MMHG

## 2018-07-05 DIAGNOSIS — Z79.01 LONG TERM CURRENT USE OF ANTICOAGULANTS WITH INR GOAL OF 2.0-3.0: ICD-10-CM

## 2018-07-05 DIAGNOSIS — E78.5 HYPERLIPIDEMIA, UNSPECIFIED HYPERLIPIDEMIA TYPE: ICD-10-CM

## 2018-07-05 DIAGNOSIS — R79.1 SUPRATHERAPEUTIC INR: ICD-10-CM

## 2018-07-05 DIAGNOSIS — E03.9 HYPOTHYROIDISM, UNSPECIFIED TYPE: Primary | ICD-10-CM

## 2018-07-05 DIAGNOSIS — I10 ESSENTIAL HYPERTENSION: ICD-10-CM

## 2018-07-05 DIAGNOSIS — D53.9 MACROCYTIC ANEMIA: ICD-10-CM

## 2018-07-05 LAB
BASOPHILS # BLD: 0 K/UL (ref 0–0.06)
BASOPHILS NFR BLD: 1 % (ref 0–2)
DIFFERENTIAL METHOD BLD: ABNORMAL
EOSINOPHIL # BLD: 0.4 K/UL (ref 0–0.4)
EOSINOPHIL NFR BLD: 11 % (ref 0–5)
ERYTHROCYTE [DISTWIDTH] IN BLOOD BY AUTOMATED COUNT: 14.2 % (ref 11.6–14.5)
FOLATE SERPL-MCNC: 18.9 NG/ML (ref 3.1–17.5)
HCT VFR BLD AUTO: 33.9 % (ref 36–48)
HGB BLD-MCNC: 10.9 G/DL (ref 13–16)
INR BLD: 3.7
LYMPHOCYTES # BLD: 0.9 K/UL (ref 0.9–3.6)
LYMPHOCYTES NFR BLD: 28 % (ref 21–52)
MCH RBC QN AUTO: 35 PG (ref 24–34)
MCHC RBC AUTO-ENTMCNC: 32.2 G/DL (ref 31–37)
MCV RBC AUTO: 109 FL (ref 74–97)
MONOCYTES # BLD: 0.4 K/UL (ref 0.05–1.2)
MONOCYTES NFR BLD: 13 % (ref 3–10)
NEUTS SEG # BLD: 1.6 K/UL (ref 1.8–8)
NEUTS SEG NFR BLD: 47 % (ref 40–73)
PLATELET # BLD AUTO: 167 K/UL (ref 135–420)
PMV BLD AUTO: 11 FL (ref 9.2–11.8)
PT POC: 44.7 SECONDS
RBC # BLD AUTO: 3.11 M/UL (ref 4.7–5.5)
VALID INTERNAL CONTROL?: YES
VIT B12 SERPL-MCNC: 749 PG/ML (ref 211–911)
WBC # BLD AUTO: 3.2 K/UL (ref 4.6–13.2)

## 2018-07-05 PROCEDURE — 82607 VITAMIN B-12: CPT | Performed by: FAMILY MEDICINE

## 2018-07-05 PROCEDURE — 85025 COMPLETE CBC W/AUTO DIFF WBC: CPT | Performed by: FAMILY MEDICINE

## 2018-07-05 PROCEDURE — 36415 COLL VENOUS BLD VENIPUNCTURE: CPT | Performed by: FAMILY MEDICINE

## 2018-07-05 NOTE — PROGRESS NOTES
1. Have you been to the ER, urgent care clinic since your last visit? Hospitalized since your last visit? No    2. Have you seen or consulted any other health care providers outside of the [address] [place] [address] since your last visit? Include any pap smears or colon screening. No       Chief Complaint   Patient presents with    Follow-up     INR/PT check     Visit Vitals    /80 (BP 1 Location: Left arm, BP Patient Position: Sitting)    Pulse 83    Temp 99.3 °F (37.4 °C) (Oral)    Resp 16    Ht 5' 11.5\" (1.816 m)    SpO2 100%     Fall Risk Assessment, last 12 mths 7/5/2018   Able to walk? Yes   Fall in past 12 months?  No               PHQ over the last two weeks 7/5/2018   Little interest or pleasure in doing things Several days   Feeling down, depressed or hopeless Several days   Total Score PHQ 2 2

## 2018-07-05 NOTE — PROGRESS NOTES
[facility]    CC: F/U for Chronic Disease Management    HPI:     HTN:  - Got requested blood work  - Taking BP medication as prescribed  - Denies any side effects or issues with his medication  - Does not check BP at home    HLD  - Got requested fasting blood work  - Taking statin as prescribed  - Denies any side effects or issues with his medication    Hypothyroidism:  - Got requested blood work  - Taking levothyroxine as prescribed  - Denies any side effects or issues with his medication    COPD:  - Using controller as prescribed  - Using O2 as needed at home (Does not remember amount, 2 or 3 L)  - Denies any issues       ROS: Positive items marked in RED  CON: fever, chills  Cardiovascular: palpitations, CP  Resp: cough, SOB, hemoptysis  GI: nausea, vomiting, diarrhea, melena, hematochezia, hematemesis  : dysuria, hematuria      Past Medical History:   Diagnosis Date    BPH (benign prostatic hyperplasia)     [facility] disease ([facility]     Hyperlipemia     Hypertension     IBS (irritable bowel syndrome)     Recurrent deep vein thrombosis (DVT) ([facility]     [facility]     lower extremity    Thyroid disease        History reviewed. No pertinent surgical history. History reviewed. No pertinent family history. Social History     Social History    Marital status:      Spouse name: N/A    Number of children: N/A    Years of education: N/A     Social History Main Topics    Smoking status: Former Smoker    Smokeless tobacco: Never Used    Alcohol use No    Drug use: No    Sexual activity: Not Asked     Other Topics Concern    None     Social History Narrative       No Known Allergies      Current Outpatient Prescriptions:     finasteride (PROSCAR) 5 mg tablet, Take 1 Tab by mouth daily. , Disp: 90 Tab, Rfl: 1    levothyroxine (SYNTHROID) 50 mcg tablet, Take 1 Tab by mouth Daily (before breakfast). , Disp: 90 Tab, Rfl: 1    atenolol (TENORMIN) 25 mg tablet, Take 1 Tab by mouth daily. , Disp: 90 Tab, Rfl: 1    acetaminophen (TYLENOL) 500 mg tablet, Take 2 Tabs by mouth every six (6) hours as needed for Pain., Disp: 90 Tab, Rfl: 1    amLODIPine (NORVASC) 10 mg tablet, Take 0.5 Tabs by mouth daily. , Disp: 90 Tab, Rfl: 1    warfarin (COUMADIN) 2 mg tablet, Take 4 Tabs by mouth daily. (Patient taking differently: Take 7 mg by mouth daily. 3.5 tablets daily =7mg daily), Disp: 120 Tab, Rfl: 3    albuterol-ipratropium (DUO-NEB) 2.5 mg-0.5 mg/3 ml nebu, 3 mL by Nebulization route every six (6) hours as needed. , Disp: 30 Nebule, Rfl: 12    atorvastatin (LIPITOR) 40 mg tablet, Take 1 Tab by mouth daily. , Disp: 90 Tab, Rfl: 1    fluticasone-salmeterol (ADVAIR) 500-50 mcg/dose diskus inhaler, Take 1 Puff by inhalation every twelve (12) hours. , Disp: 60 Each, Rfl: 3    omeprazole (PRILOSEC) 20 mg capsule, Take 1 Cap by mouth daily. , Disp: 90 Cap, Rfl: 1    apixaban (ELIQUIS) 2.5 mg tablet, Take 1 Tab by mouth two (2) times a day., Disp: 60 Tab, Rfl: 1    traMADol (ULTRAM) 50 mg tablet, Take 50 mg by mouth every six (6) hours as needed for Pain., Disp: , Rfl:     Physical Exam:      /80 (BP 1 Location: Left arm, BP Patient Position: Sitting)  Pulse 83  Temp 99.3 °F (37.4 °C) (Oral)   Resp 16  Ht 5' 11.5\" (1.816 m)  SpO2 100%    General:  WD, WN, NAD  Eyes: sclera clear bilaterally, no discharge noted, eyelids normal in appearance  HENT: NCAT, MMM  Lungs: CTAB, Normal respiratory effort and rate  CV: RRR, no MRGs  ABD: soft, non-tender, non-distended, normal bowel sounds  Ext: 2+ pitting edema at ankles, no digital cyanosis  Skin: normal temperature, turgor, color, and texture  Psych: alert and oriented to person, place and time, normal affect  Neuro: speech normal, moving all extremities    Results for Gisel Arce (MRN [id number]):   Ref. Range 7/5/2018 11:15   INR Unknown 3.7        Ref.  Range 6/21/2018 11:19   WBC Latest Ref Range: 4.6 - 13.2 K/uL 3.2 (L)   RBC Latest Ref Range: 4.70 - 5.50 M/uL 3.10 (L)   HGB Latest Ref Range: 13.0 - 16.0 g/dL 10.9 (L)   HCT Latest Ref Range: 36.0 - 48.0 % 33.4 (L)   MCV Latest Ref Range: 74.0 - 97.0 .7 (H)   MCH Latest Ref Range: 24.0 - 34.0 PG 35.2 (H)   MCHC Latest Ref Range: 31.0 - 37.0 g/dL 32.6   RDW Latest Ref Range: 11.6 - 14.5 % 14.9 (H)   PLATELET Latest Ref Range: 135 - 420 K/uL 146   MPV Latest Ref Range: 9.2 - 11.8 FL 10.6   NEUTROPHILS Latest Ref Range: 40 - 73 % 51   LYMPHOCYTES Latest Ref Range: 21 - 52 % 24   MONOCYTES Latest Ref Range: 3 - 10 % 15 (H)   EOSINOPHILS Latest Ref Range: 0 - 5 % 10 (H)   BASOPHILS Latest Ref Range: 0 - 2 % 0   DF Latest Units:   AUTOMATED   ABS. NEUTROPHILS Latest Ref Range: 1.8 - 8.0 K/UL 1.6 (L)   ABS. LYMPHOCYTES Latest Ref Range: 0.9 - 3.6 K/UL 0.7 (L)   ABS. MONOCYTES Latest Ref Range: 0.05 - 1.2 K/UL 0.5   ABS. EOSINOPHILS Latest Ref Range: 0.0 - 0.4 K/UL 0.3   ABS. BASOPHILS Latest Ref Range: 0.0 - 0.06 K/UL 0.0   Sodium Latest Ref Range: 136 - 145 mmol/L 147 (H)   Potassium Latest Ref Range: 3.5 - 5.5 mmol/L 4.2   Chloride Latest Ref Range: 100 - 108 mmol/L 113 (H)   CO2 Latest Ref Range: 21 - 32 mmol/L 24   Anion gap Latest Ref Range: 3.0 - 18 mmol/L 10   Glucose Latest Ref Range: 74 - 99 mg/dL 97   BUN Latest Ref Range: 7.0 - 18 MG/DL 13   Creatinine Latest Ref Range: 0.6 - 1.3 MG/DL 1.12   BUN/Creatinine ratio Latest Ref Range: 12 - 20   12   Calcium Latest Ref Range: 8.5 - 10.1 MG/DL 8.4 (L)   GFR est non-AA Latest Ref Range: >60 ml/min/1.73m2 >60   GFR est AA Latest Ref Range: >60 ml/min/1.73m2 >60   Bilirubin, total Latest Ref Range: 0.2 - 1.0 MG/DL 0.5   Protein, total Latest Ref Range: 6.4 - 8.2 g/dL 6.8   Albumin Latest Ref Range: 3.4 - 5.0 g/dL 3.4   Globulin Latest Ref Range: 2.0 - 4.0 g/dL 3.4   A-G Ratio Latest Ref Range: 0.8 - 1.7   1.0   ALT (SGPT) Latest Ref Range: 16 - 61 U/L 22   AST Latest Ref Range: 15 - 37 U/L 21   Alk.  phosphatase Latest Ref Range: 45 - 117 U/L 77   Cholesterol, total Latest Ref Range: <200 MG/   HDL Cholesterol Latest Ref Range: 40 - 60 MG/DL 59   LDL,Direct Latest Ref Range: 0 - 99 mg/dL 75   TSH Latest Ref Range: 0.36 - 3.74 uIU/mL 1.84       Assessment/Plan     Supratherapeutic INR:  - No signs of bleed other than mild anemia on lab work  - Will reduce warfarin dose  - Will check CBC  - Patient to follow up in 2 weeks for INR check      Macrocytic Anemia:  - Etiology is unclear  - DDx includes PPI use, COPD, and B12/Folate deficiency  - Will check CBC and B12/Folate level       HTN, Well Controlled:  - Will continue current regimen      COPD, Well Controlled:  - Will continue current controller regimen      HLD: Well Controlled:  - Will continue current statin regimen      Hypothyroidism, Well Controlled:  - Will continue current synthroid regimen        Zoila Calderon MD  7/5/2018, 11:22 AM

## 2018-07-05 NOTE — MR AVS SNAPSHOT
Vinayak Frausto [place] [address] [facility] [place]. 320 [facility] 83 [address] [phone] Patient: Marlon Dalal MRN: [id number] DIS:4/2/3363 Visit Information Date & Time Provider Department Dept. Phone Encounter #  
 7/5/2018 11:00 AM Nic Page 13 [id number] Follow-up Instructions Return in about 1 week (around 7/12/2018) for INR check. Upcoming Health Maintenance Date Due DTaP/Tdap/Td series (1 - Tdap) 6/2/1954 ZOSTER VACCINE AGE 60> 4/2/1993 GLAUCOMA SCREENING Q2Y 6/2/1998 Pneumococcal 65+ Low/Medium Risk (2 of 2 - PCV13) 10/18/2010 Influenza Age 5 to Adult 8/1/2018 Allergies as of 7/5/2018  Review Complete On: 7/5/2018 By: Mike Adams LPN No Known Allergies Current Immunizations  Reviewed on 3/28/2018 Name Date Influenza Vaccine 10/1/2014 12:00 AM, 9/16/2013 12:00 AM, 10/18/2012 12:00 AM  
 Pneumococcal Polysaccharide (PPSV-23) 10/18/2009 12:00 AM  
  
 Not reviewed this visit You Were Diagnosed With   
  
 Codes Comments Hypothyroidism, unspecified type    -  Primary ICD-10-CM: E03.9 ICD-9-CM: 244.9 Essential hypertension     ICD-10-CM: I10 
ICD-9-CM: 401.9 Macrocytic anemia     ICD-10-CM: D53.9 ICD-9-CM: 281.9 Hyperlipidemia, unspecified hyperlipidemia type     ICD-10-CM: E78.5 ICD-9-CM: 272.4 Supratherapeutic INR     ICD-10-CM: R79.1 ICD-9-CM: 790.92 Long term current use of anticoagulants with INR goal of 2.0-3.0     ICD-10-CM: Z79.01 
ICD-9-CM: V58.61 Vitals BP Pulse Temp Resp Height(growth percentile) SpO2  
 129/80 (BP 1 Location: Left arm, BP Patient Position: Sitting) 83 99.3 °F (37.4 °C) (Oral) 16 5' 11.5\" (1.816 m) 100% Smoking Status Former Smoker Preferred Pharmacy Pharmacy Name Phone  Emilee Maxwell [address] [place] [address] [address] Your Updated Medication List  
  
   
This list is accurate as of 7/5/18 11:50 AM.  Always use your most recent med list.  
  
  
  
  
 acetaminophen 500 mg tablet Commonly known as:  TYLENOL Take 2 Tabs by mouth every six (6) hours as needed for Pain. albuterol-ipratropium 2.5 mg-0.5 mg/3 ml Nebu Commonly known as:  DUO-NEB  
3 mL by Nebulization route every six (6) hours as needed. amLODIPine 10 mg tablet Commonly known as:  Harrison Conine Take 0.5 Tabs by mouth daily. atenolol 25 mg tablet Commonly known as:  TENORMIN Take 1 Tab by mouth daily. atorvastatin 40 mg tablet Commonly known as:  LIPITOR Take 1 Tab by mouth daily. finasteride 5 mg tablet Commonly known as:  PROSCAR Take 1 Tab by mouth daily. fluticasone-salmeterol 500-50 mcg/dose diskus inhaler Commonly known as:  ADVAIR Take 1 Puff by inhalation every twelve (12) hours. levothyroxine 50 mcg tablet Commonly known as:  SYNTHROID Take 1 Tab by mouth Daily (before breakfast). omeprazole 20 mg capsule Commonly known as:  PRILOSEC Take 1 Cap by mouth daily. traMADol 50 mg tablet Commonly known as:  ULTRAM  
Take 50 mg by mouth every six (6) hours as needed for Pain.  
  
 warfarin 2 mg tablet Commonly known as:  COUMADIN Take 4 Tabs by mouth daily. We Performed the Following AMB POC PT/INR [85448 CPT(R)] Follow-up Instructions Return in about 1 week (around 7/12/2018) for INR check. To-Do List   
 07/05/2018 Lab:  CBC WITH AUTOMATED DIFF   
  
 07/05/2018 Lab:  VITAMIN B12 & FOLATE Patient Instructions Take 3 mg (1.5 tablets) on Wednesday and 7 mg (3.5 tablets) on all other days. Taking Warfarin Safely: Care Instructions Your Care Instructions Warfarin is a medicine that you take to prevent blood clots.  It is often called a blood thinner. Doctors give warfarin (such as Coumadin) to reduce the risk of blood clots. You may be at risk for blood clots if you have atrial fibrillation or deep vein thrombosis. Some other health problems may also put you at risk. Warfarin slows the amount of time it takes for your blood to clot. It can cause bleeding problems. Even if you've been taking warfarin for a while, it's important to know how to take it safely. Foods and other medicines can affect the way warfarin works. Some can make warfarin work too well. This can cause bleeding problems. And some can make it work poorly, so that it does not prevent blood clots very well. You will need regular blood tests to check how long it takes for your blood to form a clot. This test is called a PT or prothrombin time test. The result of the test is called an INR level. Depending on the test results, your doctor or anticoagulation clinic may adjust your dose of warfarin. Follow-up care is a key part of your treatment and safety. Be sure to make and go to all appointments, and call your doctor if you are having problems. It's also a good idea to know your test results and keep a list of the medicines you take. How can you care for yourself at home? Take warfarin safely · Take your warfarin at the same time each day. · If you miss a dose of warfarin, don't take an extra dose to make up for it. Your doctor can tell you exactly what to do so you don't take too much or too little. · Wear medical alert jewelry that lets others know that you take warfarin. You can buy this at most drugstores. · Don't take warfarin if you are pregnant or planning to get pregnant. Talk to your doctor about how you can prevent getting pregnant while you are taking it. · Don't change your dose or stop taking warfarin unless your doctor tells you to. Effects of medicines and food on warfarin · Don't start or stop taking any medicines, vitamins, or natural remedies unless you first talk to your doctor. Many medicines can affect how warfarin works. These include aspirin and other pain relievers, over-the-counter medicines, multivitamins, dietary supplements, and herbal products. · Tell all of your doctors and pharmacists that you take warfarin. Some prescription medicines can affect how warfarin works. · Keep the amount of vitamin K in your diet about the same from day to day. Do not suddenly eat a lot more or a lot less food that is rich in vitamin K than you usually do. Vitamin K affects how warfarin works and how your blood clots. Talk with your doctor before making big changes in your diet. Foods that have a lot of vitamin K include cooked green vegetables, such as: ¨ Kale, spinach, turnip greens, urszula greens, Swiss chard, and mustard greens. ¨ [place] sprouts, broccoli, and asparagus. · Limit your use of alcohol. Avoid bleeding by preventing falls and injuries · Wear slippers or shoes with nonskid soles. · Remove throw rugs and clutter. · Rearrange furniture and electrical cords to keep them out of walking paths. · Keep stairways, porches, and outside walkways well lit. Use night-lights in hallways and bathrooms. · Be extra careful when you work with sharp tools or knives. When should you call for help? Call 911 anytime you think you may need emergency care. For example, call if: 
? · You have a sudden, severe headache that is different from past headaches. ?Call your doctor now or seek immediate medical care if: 
? · You have any abnormal bleeding, such as: 
¨ Nosebleeds. ¨ Vaginal bleeding that is different (heavier, more frequent, at a different time of the month) than what you are used to. ¨ Bloody or black stools, or rectal bleeding. ¨ Bloody or pink urine. ? Watch closely for changes in your health, and be sure to contact your doctor if you have any problems. Where can you learn more? Go to http://marcy.info/. Enter A746 in the search box to learn more about \"Taking Warfarin Safely: Care Instructions. \" Current as of: September 21, 2016 Content Version: 11.4 © 2177-0964 Healthwise, Trulioo. Care instructions adapted under license by [facility] (which disclaims liability or warranty for this information). If you have questions about a medical condition or this instruction, always ask your healthcare professional. [address] any warranty or liability for your use of this information. Introducing [facility] & HEALTH SERVICES! Bozena Woodruff introduces [employer] patient portal. Now you can access parts of your medical record, email your doctor's office, and request medication refills online. 1. In your internet browser, go to [email or web address] 2. Click on the First Time User? Click Here link in the Sign In box. You will see the New Member Sign Up page. 3. Enter your [employer] Access Code exactly as it appears below. You will not need to use this code after youve completed the sign-up process. If you do not sign up before the expiration date, you must request a new code. · [employer] Access Code: [email or web address] Expires: 10/3/2018 11:42 AM 
 
4. Enter the last four digits of your Social Security Number (xxxx) and Date of Birth (mm/dd/yyyy) as indicated and click Submit. You will be taken to the next sign-up page. 5. Create a [employer] ID. This will be your [employer] login ID and cannot be changed, so think of one that is secure and easy to remember. 6. Create a [employer] password. You can change your password at any time. 7. Enter your Password Reset Question and Answer. This can be used at a later time if you forget your password. 8. Enter your e-mail address. You will receive e-mail notification when new information is available in [address] 9. Click Sign Up. You can now view and download portions of your medical record. 10. Click the Download Summary menu link to download a portable copy of your medical information. If you have questions, please visit the Frequently Asked Questions section of the X[employer]Me website. Remember, [employer]Me is NOT to be used for urgent needs. For medical emergencies, dial 911. Now available from your iPhone and Android! Please provide this summary of care documentation to your next provider. Your primary care clinician is listed as Celestina Leiva. If you have any questions after today's visit, please call [phone].

## 2018-07-05 NOTE — PATIENT INSTRUCTIONS
Take 3 mg (1.5 tablets) on Wednesday and 7 mg (3.5 tablets) on all other days. Taking Warfarin Safely: Care Instructions  Your Care Instructions    Warfarin is a medicine that you take to prevent blood clots. It is often called a blood thinner. Doctors give warfarin (such as Coumadin) to reduce the risk of blood clots. You may be at risk for blood clots if you have atrial fibrillation or deep vein thrombosis. Some other health problems may also put you at risk. Warfarin slows the amount of time it takes for your blood to clot. It can cause bleeding problems. Even if you've been taking warfarin for a while, it's important to know how to take it safely. Foods and other medicines can affect the way warfarin works. Some can make warfarin work too well. This can cause bleeding problems. And some can make it work poorly, so that it does not prevent blood clots very well. You will need regular blood tests to check how long it takes for your blood to form a clot. This test is called a PT or prothrombin time test. The result of the test is called an INR level. Depending on the test results, your doctor or anticoagulation clinic may adjust your dose of warfarin. Follow-up care is a key part of your treatment and safety. Be sure to make and go to all appointments, and call your doctor if you are having problems. It's also a good idea to know your test results and keep a list of the medicines you take. How can you care for yourself at home? Take warfarin safely  · Take your warfarin at the same time each day. · If you miss a dose of warfarin, don't take an extra dose to make up for it. Your doctor can tell you exactly what to do so you don't take too much or too little. · Wear medical alert jewelry that lets others know that you take warfarin. You can buy this at most drugstores. · Don't take warfarin if you are pregnant or planning to get pregnant.  Talk to your doctor about how you can prevent getting pregnant while you are taking it. · Don't change your dose or stop taking warfarin unless your doctor tells you to. Effects of medicines and food on warfarin  · Don't start or stop taking any medicines, vitamins, or natural remedies unless you first talk to your doctor. Many medicines can affect how warfarin works. These include aspirin and other pain relievers, over-the-counter medicines, multivitamins, dietary supplements, and herbal products. · Tell all of your doctors and pharmacists that you take warfarin. Some prescription medicines can affect how warfarin works. · Keep the amount of vitamin K in your diet about the same from day to day. Do not suddenly eat a lot more or a lot less food that is rich in vitamin K than you usually do. Vitamin K affects how warfarin works and how your blood clots. Talk with your doctor before making big changes in your diet. Foods that have a lot of vitamin K include cooked green vegetables, such as:  ¨ Kale, spinach, turnip greens, urszula greens, Swiss chard, and mustard greens. ¨ [place] sprouts, broccoli, and asparagus. · Limit your use of alcohol. Avoid bleeding by preventing falls and injuries  · Wear slippers or shoes with nonskid soles. · Remove throw rugs and clutter. · Rearrange furniture and electrical cords to keep them out of walking paths. · Keep stairways, porches, and outside walkways well lit. Use night-lights in hallways and bathrooms. · Be extra careful when you work with sharp tools or knives. When should you call for help? Call 911 anytime you think you may need emergency care. For example, call if:  ? · You have a sudden, severe headache that is different from past headaches. ?Call your doctor now or seek immediate medical care if:  ? · You have any abnormal bleeding, such as:  ¨ Nosebleeds. ¨ Vaginal bleeding that is different (heavier, more frequent, at a different time of the month) than what you are used to.   ¨ Bloody or black stools, or rectal bleeding. ¨ Bloody or pink urine. ? Watch closely for changes in your health, and be sure to contact your doctor if you have any problems. Where can you learn more? Go to http://bienvenido-bee.info/. Enter W602 in the search box to learn more about \"Taking Warfarin Safely: Care Instructions. \"  Current as of: September 21, 2016  Content Version: 11.4  © 4173-7896 [employer]. Care instructions adapted under license by [employer] (which disclaims liability or warranty for this information). If you have questions about a medical condition or this instruction, always ask your healthcare professional. [address] any warranty or liability for your use of this information.

## 2018-07-19 ENCOUNTER — CLINICAL SUPPORT (OUTPATIENT)
Dept: FAMILY MEDICINE CLINIC | Age: 83
End: 2018-07-19

## 2018-07-19 VITALS
HEIGHT: 71 IN | DIASTOLIC BLOOD PRESSURE: 80 MMHG | SYSTOLIC BLOOD PRESSURE: 139 MMHG | RESPIRATION RATE: 16 BRPM | HEART RATE: 74 BPM | OXYGEN SATURATION: 95 %

## 2018-07-19 DIAGNOSIS — Z79.01 LONG TERM CURRENT USE OF ANTICOAGULANTS WITH INR GOAL OF 2.0-3.0: Primary | ICD-10-CM

## 2018-07-19 LAB
INR BLD: 2.5
PT POC: 29.9 SECONDS
VALID INTERNAL CONTROL?: YES

## 2018-07-19 NOTE — PROGRESS NOTES
Alberto Tamayo is a 80 y.o. male who presents today for Anticoagulation monitoring. Indication: 2.5  INR Goal: 2.0 - 3.0  Current dose:  Coumadin 2mg daily.   Missed Coumadin Doses:  None  Medication Changes: No  Dietary Changes: None    Symptoms: None    Latest INRs:  Lab Results   Component Value Date/Time    INR 1.3 (H) 03/05/2010 04:25 AM    INR 1.2 03/04/2010 04:45 AM    INR 1.2 03/03/2010 06:15 AM    INR POC 2.5 07/19/2018 11:07 AM    INR POC 3.7 07/05/2018 11:15 AM    INR POC 2.7 06/07/2018 11:20 AM    Prothrombin time 15.9 (H) 03/05/2010 04:25 AM    Prothrombin time 15.2 03/04/2010 04:45 AM    Prothrombin time 15.3 (H) 03/03/2010 06:15 AM        New Coumadin dose: No change in therapy    Next check to be scheduled for 2 weeks on August 2,2018 at 11:00AM.

## 2018-07-20 DIAGNOSIS — R52 PAIN: ICD-10-CM

## 2018-07-20 RX ORDER — ACETAMINOPHEN 500 MG
1000 TABLET ORAL
Qty: 90 TAB | Refills: 1 | Status: SHIPPED | OUTPATIENT
Start: 2018-07-20 | End: 2021-08-30 | Stop reason: SDUPTHER

## 2018-07-23 ENCOUNTER — TELEPHONE (OUTPATIENT)
Dept: FAMILY MEDICINE CLINIC | Age: 83
End: 2018-07-23

## 2018-07-23 NOTE — TELEPHONE ENCOUNTER
Patient called to ask if it was ok to take Acetaminophen generic for MAPAP. He said that in the past his bottle read Mapap 500mg. When refill was sent to pharmacy, he was given acetaminophen 500mg. Patient made aware that the Rx he received at the pharmacy is generic for Mapap. Patient stated that he is not allergic to tylenol or acetaminophen to his knowledge. Patient has no known allergies to medication according to chart. Patient made aware that it is ok for him to take the generic brand based on \"no known allergies\" in chart and patient verbally stating that he is not allergic any medications.

## 2018-08-02 ENCOUNTER — OFFICE VISIT (OUTPATIENT)
Dept: FAMILY MEDICINE CLINIC | Age: 83
End: 2018-08-02

## 2018-08-02 VITALS
HEART RATE: 69 BPM | OXYGEN SATURATION: 96 % | RESPIRATION RATE: 16 BRPM | HEIGHT: 71 IN | TEMPERATURE: 98.3 F | SYSTOLIC BLOOD PRESSURE: 133 MMHG | DIASTOLIC BLOOD PRESSURE: 75 MMHG

## 2018-08-02 DIAGNOSIS — Z79.01 LONG TERM CURRENT USE OF ANTICOAGULANTS WITH INR GOAL OF 2.0-3.0: ICD-10-CM

## 2018-08-02 DIAGNOSIS — Z51.81 SUBTHERAPEUTIC ANTICOAGULATION: ICD-10-CM

## 2018-08-02 DIAGNOSIS — R60.0 BILATERAL LEG EDEMA: Primary | ICD-10-CM

## 2018-08-02 DIAGNOSIS — Z86.718 HISTORY OF DVT (DEEP VEIN THROMBOSIS): ICD-10-CM

## 2018-08-02 DIAGNOSIS — Z79.01 SUBTHERAPEUTIC ANTICOAGULATION: ICD-10-CM

## 2018-08-02 DIAGNOSIS — B88.8 INFESTATION BY BED BUG: ICD-10-CM

## 2018-08-02 LAB
INR BLD: 1.5
PT POC: 18.5 SECONDS
VALID INTERNAL CONTROL?: YES

## 2018-08-02 NOTE — MR AVS SNAPSHOT
Vinayak Frausto [place] [address] [facility] [place]. 320 [facility] 83 [address] 
[phone] Patient: Jose Manuel Manley MRN: [id number] QED:[birth year] Visit Information Date & Time Provider Department Dept. Phone Encounter #  
 8/2/2018 11:00 AM Yoselin Mahmood, [address] [phone] [id number] Follow-up Instructions Return in about 2 weeks (around 8/16/2018). Upcoming Health Maintenance Date Due DTaP/Tdap/Td series (1 - Tdap) 6[birth year]2/1954 ZOSTER VACCINE AGE 60> 4/2/1993 GLAUCOMA SCREENING Q2Y 6/2/1998 Pneumococcal 65+ Low/Medium Risk (2 of 2 - PCV13) 10/18/2010 Influenza Age 5 to Adult 8/1/2018 Allergies as of 8/2/2018  Review Complete On: 8/2/2018 By: Santa Urias LPN No Known Allergies Current Immunizations  Reviewed on 3/28/2018 Name Date Influenza Vaccine 10/1/2014 12:00 AM, 9/16/2013 12:00 AM, 10/18/2012 12:00 AM  
 Pneumococcal Polysaccharide (PPSV-23) 10/18/2009 12:00 AM  
  
 Not reviewed this visit You Were Diagnosed With   
  
 Codes Comments Long term current use of anticoagulants with INR goal of 2.0-3.0    -  Primary ICD-10-CM: Z79.01 
ICD-9-CM: V58.61 History of DVT (deep vein thrombosis)     ICD-10-CM: M74.995 ICD-9-CM: V12.51 Bilateral leg edema     ICD-10-CM: R60.0 ICD-9-CM: 713. 3 Vitals BP Pulse Temp Resp Height(growth percentile) SpO2  
 133/75 (BP 1 Location: Left arm, BP Patient Position: Sitting) 69 98.3 °F (36.8 °C) (Oral) 16 5' 11\" (1.803 m) 96% Smoking Status Former Smoker Preferred Pharmacy Pharmacy Name Phone [facility] [phone] [address] [address] [place] Your Updated Medication List  
  
   
This list is accurate as of 8/2/18 12:33 PM.  Always use your most recent med list.  
  
  
  
  
 acetaminophen 500 mg tablet Commonly known as:  TYLENOL  
 Take 2 Tabs by mouth every six (6) hours as needed for Pain. albuterol-ipratropium 2.5 mg-0.5 mg/3 ml Nebu Commonly known as:  DUO-NEB  
3 mL by Nebulization route every six (6) hours as needed. amLODIPine 10 mg tablet Commonly known as:  V[place]t Mullet Take 0.5 Tabs by mouth daily. atenolol 25 mg tablet Commonly known as:  TENORMIN Take 1 Tab by mouth daily. atorvastatin 40 mg tablet Commonly known as:  LIPITOR Take 1 Tab by mouth daily. finasteride 5 mg tablet Commonly known as:  PROSCAR Take 1 Tab by mouth daily. fluticasone-salmeterol 500-50 mcg/dose diskus inhaler Commonly known as:  ADVAIR Take 1 Puff by inhalation every twelve (12) hours. levothyroxine 50 mcg tablet Commonly known as:  SYNTHROID Take 1 Tab by mouth Daily (before breakfast). omeprazole 20 mg capsule Commonly known as:  PRILOSEC Take 1 Cap by mouth daily. warfarin 2 mg tablet Commonly known as:  COUMADIN Take 4 Tabs by mouth daily. We Performed the Following AMB POC PT/INR [71690 CPT(R)] REFERRAL TO VASCULAR CENTER [[id number] Custom] Follow-up Instructions Return in about 2 weeks (around 8/16/2018). Referral Information Referral ID Referred By Referred To  
  
 [id number] [address] [facility] [address], [facility] [address] [facility] [address] [place], [address] Phone: [phone] Fax: [phone] Visits Status Start Date End Date 1 New Request 8/2/18 8/2/19 If your referral has a status of pending review or denied, additional information will be sent to support the outcome of this decision. Patient Instructions Take 5 mg (2.5 tablets) on Wednesday and 7 mg (3.5 tablets) on all other days. Introducing hospitals & HEALTH SERVICES!    
 [employer] introduces [email or web address] patient portal. Now you can access parts of your medical record, email your doctor's office, and request medication refills online. 1. In your internet browser, go to [email or web address] 2. Click on the First Time User? Click Here link in the Sign In box. You will see the New Member Sign Up page. 3. Enter your I-lighting Access Code exactly as it appears below. You will not need to use this code after youve completed the sign-up process. If you do not sign up before the expiration date, you must request a new code. · [employer] Access Code: [email or web address] Expires: 10/3/2018 11:42 AM 
 
4. Enter the last four digits of your Social Security Number (xxxx) and Date of Birth (mm/dd/yyyy) as indicated and click Submit. You will be taken to the next sign-up page. 5. Create a I-lighting ID. This will be your I-[employer]ing login ID and cannot be changed, so think of one that is secure and easy to remember. 6. Create a I-lighting password. You can change your password at any time. 7. Enter your Password Reset Question and Answer. This can be used at a later time if you forget your password. 8. Enter your e-mail address. You will receive e-mail notification when new information is available in [address] 9. Click Sign Up. You can now view and download portions of your medical record. 10. Click the Download Summary menu link to download a portable copy of your medical information. If you have questions, please visit the Frequently Asked Questions section of the I-lighting website. Remember, I-[employer]ing is NOT to be used for urgent needs. For medical emergencies, dial 911. Now available from your iPhone and Android! Please provide this summary of care documentation to your next provider. Your primary care clinician is listed as Joseph Torres. If you have any questions after today's visit, please call [phone].

## 2018-08-02 NOTE — PROGRESS NOTES
Chief Complaint   Patient presents with    Follow Up Chronic Condition     PT/INR check     1. Have you been to the ER, urgent care clinic since your last visit? Hospitalized since your last visit? No    2. Have you seen or consulted any other health care providers outside of the [address] [place] [address] since your last visit? Include any pap smears or colon screening. No     Visit Vitals    /75 (BP 1 Location: Left arm, BP Patient Position: Sitting)    Pulse 69    Temp 98.3 °F (36.8 °C) (Oral)    Resp 16    Ht 5' 11\" (1.803 m)    SpO2 96%     Bonnie Baker is a 80 y.o. male who presents today for Anticoagulation monitoring. Indication: 1.5  INR Goal: 2.0 - 3.0  Current dose:  Coumadin 2mg daily. Missed Coumadin Doses:  None  Medication Changes:  No  Dietary Changes:  None    Symptoms: None    Latest INRs:  Lab Results   Component Value Date/Time    INR 1.3 (H) 03/05/2010 04:25 AM    INR 1.2 03/04/2010 04:45 AM    INR 1.2 03/03/2010 06:15 AM    INR POC 1.5 08/02/2018 11:48 AM    INR POC 2.5 07/19/2018 11:07 AM    INR POC 3.7 07/05/2018 11:15 AM    Prothrombin time 15.9 (H) 03/05/2010 04:25 AM    Prothrombin time 15.2 03/04/2010 04:45 AM    Prothrombin time 15.3 (H) 03/03/2010 06:15 AM        New Coumadin dose:. Next check to be scheduled for 2 weeks. Fall Risk Assessment, last 12 mths 8/2/2018   Able to walk? Yes   Fall in past 12 months?  No

## 2018-08-16 ENCOUNTER — CLINICAL SUPPORT (OUTPATIENT)
Dept: FAMILY MEDICINE CLINIC | Age: 83
End: 2018-08-16

## 2018-08-16 DIAGNOSIS — Z79.01 LONG TERM CURRENT USE OF ANTICOAGULANTS WITH INR GOAL OF 2.0-3.0: Primary | ICD-10-CM

## 2018-08-16 LAB
INR BLD: 2.4
PT POC: 28.2 SECONDS
VALID INTERNAL CONTROL?: YES

## 2018-08-16 NOTE — PROGRESS NOTES
Drew Coelho is a 80 y.o. male who presents today for Anticoagulation monitoring. Indication: INR 2.4 and PT 28.2  INR Goal: 2.0 - 3.0  Current dose:  Coumadin 2.5mg on Wednesdays and 3.5mg on other days. Missed Coumadin Doses: None  Medication Changes:  No  Dietary Changes: None    Symptoms: None    Latest INRs:  Lab Results   Component Value Date/Time    INR 1.3 (H) 03/05/2010 04:25 AM    INR 1.2 03/04/2010 04:45 AM    INR 1.2 03/03/2010 06:15 AM    INR POC 1.5 08/02/2018 11:48 AM    INR POC 2.5 07/19/2018 11:07 AM    INR POC 3.7 07/05/2018 11:15 AM    Prothrombin time 15.9 (H) 03/05/2010 04:25 AM    Prothrombin time 15.2 03/04/2010 04:45 AM    Prothrombin time 15.3 (H) 03/03/2010 06:15 AM        New Coumadin dose::current treatment plan is effective, no change in therapy. Next check to be scheduled for 2 weeks.

## 2018-08-30 ENCOUNTER — CLINICAL SUPPORT (OUTPATIENT)
Dept: FAMILY MEDICINE CLINIC | Age: 83
End: 2018-08-30

## 2018-08-30 DIAGNOSIS — Z79.01 LONG TERM CURRENT USE OF ANTICOAGULANTS WITH INR GOAL OF 2.0-3.0: Primary | ICD-10-CM

## 2018-08-30 LAB
INR BLD: 2.6
PT POC: 31.3 SECONDS
VALID INTERNAL CONTROL?: YES

## 2018-08-30 NOTE — PROGRESS NOTES
Regino Brady is a 80 y.o. male who presents today for Anticoagulation monitoring. Indication: INR 2.6 , PT 31.3  INR Goal: 2.0 - 3.0  Current dose:  Patientstated he took Coumadin 5mg on Wednesday; 7mg  All other days. Missed Coumadin Doses:  None  Medication Changes:  None  Dietary Changes:  None    Symptoms: No symptoms    Latest INRs:  Lab Results   Component Value Date/Time    INR 1.3 (H) 03/05/2010 04:25 AM    INR 1.2 03/04/2010 04:45 AM    INR 1.2 03/03/2010 06:15 AM    INR POC 2.4 08/16/2018 11:25 AM    INR POC 1.5 08/02/2018 11:48 AM    INR POC 2.5 07/19/2018 11:07 AM    Prothrombin time 15.9 (H) 03/05/2010 04:25 AM    Prothrombin time 15.2 03/04/2010 04:45 AM    Prothrombin time 15.3 (H) 03/03/2010 06:15 AM        New Coumadin dose: current treatment plan is effective, no change in therapy    Next check to be scheduled for 4 weeks.

## 2018-09-19 DIAGNOSIS — K21.9 GASTROESOPHAGEAL REFLUX DISEASE, ESOPHAGITIS PRESENCE NOT SPECIFIED: ICD-10-CM

## 2018-09-19 DIAGNOSIS — J44.9 CHRONIC OBSTRUCTIVE PULMONARY DISEASE, UNSPECIFIED COPD TYPE (HCC): ICD-10-CM

## 2018-09-20 RX ORDER — OMEPRAZOLE 20 MG/1
20 CAPSULE, DELAYED RELEASE ORAL DAILY
Qty: 90 CAP | Refills: 1 | Status: SHIPPED | OUTPATIENT
Start: 2018-09-20 | End: 2019-03-15 | Stop reason: SDUPTHER

## 2018-09-25 DIAGNOSIS — Z79.01 LONG TERM (CURRENT) USE OF ANTICOAGULANTS: ICD-10-CM

## 2018-09-26 RX ORDER — WARFARIN 2 MG/1
TABLET ORAL
Qty: 120 TAB | Refills: 0 | Status: SHIPPED | OUTPATIENT
Start: 2018-09-26 | End: 2019-02-12 | Stop reason: SDUPTHER

## 2018-09-27 DIAGNOSIS — R35.0 BENIGN PROSTATIC HYPERPLASIA WITH URINARY FREQUENCY: ICD-10-CM

## 2018-09-27 DIAGNOSIS — I10 ESSENTIAL HYPERTENSION: ICD-10-CM

## 2018-09-27 DIAGNOSIS — E03.9 HYPOTHYROIDISM, UNSPECIFIED TYPE: ICD-10-CM

## 2018-09-27 DIAGNOSIS — N40.1 BENIGN PROSTATIC HYPERPLASIA WITH URINARY FREQUENCY: ICD-10-CM

## 2018-10-01 RX ORDER — LEVOTHYROXINE SODIUM 50 UG/1
50 TABLET ORAL
Qty: 90 TAB | Refills: 1 | Status: SHIPPED | OUTPATIENT
Start: 2018-10-01 | End: 2019-05-16 | Stop reason: SDUPTHER

## 2018-10-01 RX ORDER — ATENOLOL 25 MG/1
25 TABLET ORAL DAILY
Qty: 90 TAB | Refills: 1 | Status: SHIPPED | OUTPATIENT
Start: 2018-10-01 | End: 2019-01-08 | Stop reason: SDUPTHER

## 2018-10-01 RX ORDER — FINASTERIDE 5 MG/1
5 TABLET, FILM COATED ORAL DAILY
Qty: 90 TAB | Refills: 1 | Status: SHIPPED | OUTPATIENT
Start: 2018-10-01 | End: 2019-01-08 | Stop reason: SDUPTHER

## 2018-10-11 DIAGNOSIS — E78.5 HYPERLIPIDEMIA, UNSPECIFIED HYPERLIPIDEMIA TYPE: ICD-10-CM

## 2018-10-11 NOTE — TELEPHONE ENCOUNTER
PCP is not in the office    Last visit: 8/2/2018    Next appointment: 10/25/2018    Last filled: 3/28/2018    Quantity: 90 with 1 refill

## 2018-10-15 RX ORDER — ATORVASTATIN CALCIUM 40 MG/1
40 TABLET, FILM COATED ORAL DAILY
Qty: 90 TAB | Refills: 1 | Status: SHIPPED | OUTPATIENT
Start: 2018-10-15 | End: 2019-04-01 | Stop reason: SDUPTHER

## 2018-10-25 ENCOUNTER — OFFICE VISIT (OUTPATIENT)
Dept: FAMILY MEDICINE CLINIC | Age: 83
End: 2018-10-25

## 2018-10-25 ENCOUNTER — HOSPITAL ENCOUNTER (OUTPATIENT)
Dept: LAB | Age: 83
Discharge: HOME | End: 2018-10-25
Payer: COMMERCIAL

## 2018-10-25 VITALS
TEMPERATURE: 98.2 F | DIASTOLIC BLOOD PRESSURE: 96 MMHG | HEIGHT: 71 IN | BODY MASS INDEX: 27.75 KG/M2 | RESPIRATION RATE: 20 BRPM | OXYGEN SATURATION: 92 % | HEART RATE: 87 BPM | SYSTOLIC BLOOD PRESSURE: 143 MMHG

## 2018-10-25 DIAGNOSIS — I10 ESSENTIAL HYPERTENSION: Primary | ICD-10-CM

## 2018-10-25 DIAGNOSIS — Z23 ENCOUNTER FOR IMMUNIZATION: ICD-10-CM

## 2018-10-25 DIAGNOSIS — Z86.718 HISTORY OF DVT (DEEP VEIN THROMBOSIS): ICD-10-CM

## 2018-10-25 DIAGNOSIS — N40.1 BENIGN PROSTATIC HYPERPLASIA WITH URINARY FREQUENCY: ICD-10-CM

## 2018-10-25 DIAGNOSIS — Z79.01 LONG TERM CURRENT USE OF ANTICOAGULANTS WITH INR GOAL OF 2.0-3.0: ICD-10-CM

## 2018-10-25 DIAGNOSIS — R35.0 URINARY FREQUENCY: ICD-10-CM

## 2018-10-25 DIAGNOSIS — Z79.01 SUBTHERAPEUTIC ANTICOAGULATION: ICD-10-CM

## 2018-10-25 DIAGNOSIS — R35.0 BENIGN PROSTATIC HYPERPLASIA WITH URINARY FREQUENCY: ICD-10-CM

## 2018-10-25 DIAGNOSIS — Z51.81 SUBTHERAPEUTIC ANTICOAGULATION: ICD-10-CM

## 2018-10-25 LAB
ALBUMIN SERPL-MCNC: 3.5 G/DL (ref 3.4–5)
ALBUMIN/GLOB SERPL: 1 {RATIO} (ref 0.8–1.7)
ALP SERPL-CCNC: 89 U/L (ref 45–117)
ALT SERPL-CCNC: 26 U/L (ref 16–61)
ANION GAP SERPL CALC-SCNC: 5 MMOL/L (ref 3–18)
AST SERPL-CCNC: 29 U/L (ref 15–37)
BILIRUB SERPL-MCNC: 0.4 MG/DL (ref 0.2–1)
BILIRUB UR QL STRIP: NEGATIVE
BUN SERPL-MCNC: 16 MG/DL (ref 7–18)
BUN/CREAT SERPL: 14 (ref 12–20)
CALCIUM SERPL-MCNC: 8.8 MG/DL (ref 8.5–10.1)
CHLORIDE SERPL-SCNC: 111 MMOL/L (ref 100–108)
CO2 SERPL-SCNC: 28 MMOL/L (ref 21–32)
CREAT SERPL-MCNC: 1.13 MG/DL (ref 0.6–1.3)
GLOBULIN SER CALC-MCNC: 3.4 G/DL (ref 2–4)
GLUCOSE SERPL-MCNC: 98 MG/DL (ref 74–99)
GLUCOSE UR-MCNC: NEGATIVE MG/DL
INR BLD: 1.4
KETONES P FAST UR STRIP-MCNC: NEGATIVE MG/DL
PH UR STRIP: 8.5 [PH] (ref 4.6–8)
POTASSIUM SERPL-SCNC: 4.3 MMOL/L (ref 3.5–5.5)
PROT SERPL-MCNC: 6.9 G/DL (ref 6.4–8.2)
PROT UR QL STRIP: ABNORMAL
PT POC: NORMAL SECONDS
SODIUM SERPL-SCNC: 144 MMOL/L (ref 136–145)
SP GR UR STRIP: 1.02 (ref 1–1.03)
UA UROBILINOGEN AMB POC: ABNORMAL (ref 0.2–1)
URINALYSIS CLARITY POC: CLEAR
URINALYSIS COLOR POC: YELLOW
URINE BLOOD POC: NEGATIVE
URINE LEUKOCYTES POC: NEGATIVE
URINE NITRITES POC: NEGATIVE
VALID INTERNAL CONTROL?: YES

## 2018-10-25 PROCEDURE — 36415 COLL VENOUS BLD VENIPUNCTURE: CPT | Performed by: FAMILY MEDICINE

## 2018-10-25 PROCEDURE — 80053 COMPREHEN METABOLIC PANEL: CPT | Performed by: FAMILY MEDICINE

## 2018-10-25 RX ORDER — TERAZOSIN 1 MG/1
1 CAPSULE ORAL
Qty: 30 CAP | Refills: 1 | Status: SHIPPED | OUTPATIENT
Start: 2018-10-25 | End: 2018-12-31 | Stop reason: SDUPTHER

## 2018-10-25 NOTE — PROGRESS NOTES
1. Have you been to the ER, urgent care clinic since your last visit? Hospitalized since your last visit? No    2. Have you seen or consulted any other health care providers outside of the [address] [place] [address] since your last visit? Include any pap smears or colon screening.  Yes When: August 2018      Chief Complaint   Patient presents with    Follow-up     INR    Generalized Body Aches     Visit Vitals  BP (!) 143/96   Pulse 87   Temp 98.2 °F (36.8 °C)   Resp 20   Ht 5' 11\" (1.803 m)   SpO2 92%   BMI 27.75 kg/m²

## 2018-10-25 NOTE — PROGRESS NOTES
Jericho Guzman    CC: INR check    HPI:     Long term use of Anticoagulant due DVT Hx:  - Taking warfarin as prescribed (5 mg on Wednesdays and 7 mg on all other days)  - Denies any side effects (including bleeding) from his medication  - Reports being on 3 new medications (Does not remember the names)  starting 2-3 weeks ago due to a cold      HTN:  - Does not check BP at home  - Did not take medication today   - Denies any known side effect of issues with medication      ROS: Positive items marked in RED  CON: fever, chills  Cardiovascular: palpitations, CP  Resp: SOB, cough, hemoptysis  GI: nausea, vomiting, diarrhea, melena, hematochezia, hematemesis  : dysuria, hematuria, urinary frequency (Issue for the past 2-3 weeks. Hx of BPH)      Past Medical History:   Diagnosis Date    BPH (benign prostatic hyperplasia)     Chronic obstructive pulmonary disease (HCC)     Hyperlipemia     Hypertension     IBS (irritable bowel syndrome)     Recurrent deep vein thrombosis (DVT) (HCC)     Thromboembolus (HCC)     lower extremity    Thyroid disease        History reviewed. No pertinent surgical history. History reviewed. No pertinent family history.     Social History     Socioeconomic History    Marital status:      Spouse name: Not on file    Number of children: Not on file    Years of education: Not on file    Highest education level: Not on file   Social Needs    Financial resource strain: Not on file    Food insecurity - worry: Not on file    Food insecurity - inability: Not on file    Transportation needs - medical: Not on file   [place] needs - non-medical: Not on file   Occupational History    Not on file   Tobacco Use    Smoking status: Former Smoker    Smokeless tobacco: Never Used   Substance and Sexual Activity    Alcohol use: No    Drug use: No    Sexual activity: Not on file   Other Topics Concern    Not on file   Social History Narrative    Not on file No Known Allergies      Current Outpatient Medications:     atorvastatin (LIPITOR) 40 mg tablet, Take 1 Tab by mouth daily. , Disp: 90 Tab, Rfl: 1    levothyroxine (SYNTHROID) 50 mcg tablet, Take 1 Tab by mouth Daily (before breakfast). , Disp: 90 Tab, Rfl: 1    finasteride (PROSCAR) 5 mg tablet, Take 1 Tab by mouth daily. , Disp: 90 Tab, Rfl: 1    atenolol (TENORMIN) 25 mg tablet, Take 1 Tab by mouth daily. , Disp: 90 Tab, Rfl: 1    warfarin (COUMADIN) 2 mg tablet, TAKE 4 TABLETS BY MOUTH ONCE DAILY, Disp: 120 Tab, Rfl: 0    omeprazole (PRILOSEC) 20 mg capsule, Take 1 Cap by mouth daily. , Disp: 90 Cap, Rfl: 1    acetaminophen (TYLENOL) 500 mg tablet, Take 2 Tabs by mouth every six (6) hours as needed for Pain., Disp: 90 Tab, Rfl: 1    amLODIPine (NORVASC) 10 mg tablet, Take 0.5 Tabs by mouth daily. , Disp: 90 Tab, Rfl: 1    albuterol-ipratropium (DUO-NEB) 2.5 mg-0.5 mg/3 ml nebu, 3 mL by Nebulization route every six (6) hours as needed. , Disp: 30 Nebule, Rfl: 12    fluticasone-salmeterol (ADVAIR) 500-50 mcg/dose diskus inhaler, Take 1 Puff by inhalation every twelve (12) hours. , Disp: 60 Each, Rfl: 3    Physical Exam:      BP (!) 143/96   Pulse 87   Temp 98.2 °F (36.8 °C)   Resp 20   Ht 5' 11\" (1.803 m)   SpO2 92%   BMI 27.75 kg/m²     General:  WD, WN, NAD, conversant  Eyes: sclera clear bilaterally, no discharge noted, eyelids normal in appearance  HENT: NCAT  Lungs: CTAB, normal respiratory effort and rate  CV: RRR, no MRGs  ABD: soft, non-tender, non-distended, normal bowel sounds  Skin: normal temperature, turgor, color, and texture  Psych: alert and oriented to person, place and situation, normal affect  Neuro: speech normal, moving all extremities, gait normal    Results for Liam Glover (MRN [id number]):   Ref.  Range 10/25/2018 13:33   Color (UA POC) Unknown Yellow   Clarity (UA POC) Unknown Clear   Specific gravity (UA POC) Latest Ref Range: 1.001 - 1.035  1.020   pH (UA POC) Latest Ref Range: 4.6 - 8.0  8.5 (A)   Protein (UA POC) Latest Ref Range: Negative  Trace   Glucose (UA POC) Latest Ref Range: Negative  Negative   Ketones (UA POC) Latest Ref Range: Negative  Negative   Blood (UA POC) Latest Ref Range: Negative  Negative   Bilirubin (UA POC) Latest Ref Range: Negative  Negative   Urobilinogen (UA POC) Latest Ref Range: 0.2 - 1  0.2 mg/dL   Nitrites (UA POC) Latest Ref Range: Negative  Negative   Leukocyte esterase (UA POC) Latest Ref Range: Negative  Negative      Ref.  Range 10/25/2018 12:21   INR Unknown 1.4       Assessment/Plan     Subtherapeutic Anticoagulation:  - Likely 2/2 medications used for recent cold  - Will continue current warfarin regimen, as he is likely to become therapeutic again as he has completed the prior medication regimen for his cold  - Adding on terazosin for his BPH, as it has no known interactions with warfarin  - F/U in 2 weeks for INR and BP check      BPH, Likely inadequately Controlled:  - Will add on terazosin to current regimen  - F/U in 2 weeks for INR and BP check      HTN, Inadequately Controlled:  - 2/2 not taking his medication today  - Will continue current BP regimen, as he has been previously controlled on this regimen  - Will add on terazosin to medications due to BPH, but will need to closely monitor BP to ensure he develops no issue with low BP  - F/U in 2 weeks for INR and BP check      Health Maintenance:  - Flu shot today        Leon Gupta MD  10/25/2018, 12:11 PM

## 2018-11-09 ENCOUNTER — HOSPITAL ENCOUNTER (OUTPATIENT)
Dept: LAB | Age: 83
Discharge: HOME | End: 2018-11-09
Payer: COMMERCIAL

## 2018-11-09 ENCOUNTER — CLINICAL SUPPORT (OUTPATIENT)
Dept: FAMILY MEDICINE CLINIC | Age: 83
End: 2018-11-09

## 2018-11-09 DIAGNOSIS — Z86.718 HISTORY OF DVT (DEEP VEIN THROMBOSIS): ICD-10-CM

## 2018-11-09 DIAGNOSIS — Z79.01 LONG TERM CURRENT USE OF ANTICOAGULANTS WITH INR GOAL OF 2.0-3.0: ICD-10-CM

## 2018-11-09 LAB
INR PPP: 1.9 (ref 0.8–1.2)
PROTHROMBIN TIME: 21.3 SEC (ref 11.5–15.2)

## 2018-11-09 PROCEDURE — 36415 COLL VENOUS BLD VENIPUNCTURE: CPT

## 2018-11-09 PROCEDURE — 85610 PROTHROMBIN TIME: CPT

## 2018-11-27 ENCOUNTER — CLINICAL SUPPORT (OUTPATIENT)
Dept: FAMILY MEDICINE CLINIC | Age: 83
End: 2018-11-27

## 2018-11-27 DIAGNOSIS — Z86.718 HISTORY OF DVT (DEEP VEIN THROMBOSIS): Primary | ICD-10-CM

## 2018-11-27 LAB
INR BLD: 2.2
PT POC: NORMAL SECONDS
VALID INTERNAL CONTROL?: YES

## 2018-11-27 NOTE — PROGRESS NOTES
Roselia Elizalde is a 80 y.o. male who presents today for Anticoagulation monitoring. Indication: History of DVT  INR Goal: 2.0-3.0. Current dose: Coumadin 5 mg on Wednesdays and 7 mg on all other days. Missed Coumadin Doses:  None  Medication Changes:  no  Dietary Changes:  no    Symptoms: taking coumadin appropriately without any bleeding. Latest INRs:  Lab Results   Component Value Date/Time    INR 1.9 (H) 11/09/2018 10:59 AM    INR 1.3 (H) 03/05/2010 04:25 AM    INR 1.2 03/04/2010 04:45 AM    INR POC 2.2 11/27/2018 11:36 AM    INR POC 1.4 10/25/2018 12:21 PM    INR POC 2.6 08/30/2018 11:00 AM    Prothrombin time 21.3 (H) 11/09/2018 10:59 AM    Prothrombin time 15.9 (H) 03/05/2010 04:25 AM    Prothrombin time 15.2 03/04/2010 04:45 AM        New Coumadin dose:.current treatment plan is effective, no change in therapy. Next check to be scheduled for  2 weeks.

## 2018-12-11 ENCOUNTER — CLINICAL SUPPORT (OUTPATIENT)
Dept: FAMILY MEDICINE CLINIC | Age: 83
End: 2018-12-11

## 2018-12-11 DIAGNOSIS — Z86.718 HISTORY OF DVT (DEEP VEIN THROMBOSIS): Primary | ICD-10-CM

## 2018-12-11 LAB
INR BLD: 2.1
PT POC: NORMAL SECONDS
VALID INTERNAL CONTROL?: YES

## 2019-01-08 DIAGNOSIS — I10 ESSENTIAL HYPERTENSION: ICD-10-CM

## 2019-01-08 DIAGNOSIS — N40.1 BENIGN PROSTATIC HYPERPLASIA WITH URINARY FREQUENCY: ICD-10-CM

## 2019-01-08 DIAGNOSIS — R35.0 BENIGN PROSTATIC HYPERPLASIA WITH URINARY FREQUENCY: ICD-10-CM

## 2019-01-09 ENCOUNTER — OFFICE VISIT (OUTPATIENT)
Dept: FAMILY MEDICINE CLINIC | Age: 84
End: 2019-01-09

## 2019-01-09 VITALS
RESPIRATION RATE: 20 BRPM | HEART RATE: 79 BPM | SYSTOLIC BLOOD PRESSURE: 137 MMHG | DIASTOLIC BLOOD PRESSURE: 85 MMHG | OXYGEN SATURATION: 93 % | TEMPERATURE: 99.2 F

## 2019-01-09 DIAGNOSIS — Z86.718 HISTORY OF DVT (DEEP VEIN THROMBOSIS): ICD-10-CM

## 2019-01-09 DIAGNOSIS — R35.0 BENIGN PROSTATIC HYPERPLASIA WITH URINARY FREQUENCY: ICD-10-CM

## 2019-01-09 DIAGNOSIS — Z79.01 LONG TERM CURRENT USE OF ANTICOAGULANTS WITH INR GOAL OF 2.0-3.0: ICD-10-CM

## 2019-01-09 DIAGNOSIS — I10 ESSENTIAL HYPERTENSION: Primary | ICD-10-CM

## 2019-01-09 DIAGNOSIS — Z51.81 SUBTHERAPEUTIC ANTICOAGULATION: ICD-10-CM

## 2019-01-09 DIAGNOSIS — N40.1 BENIGN PROSTATIC HYPERPLASIA WITH URINARY FREQUENCY: ICD-10-CM

## 2019-01-09 DIAGNOSIS — Z79.01 SUBTHERAPEUTIC ANTICOAGULATION: ICD-10-CM

## 2019-01-09 LAB
INR BLD: 1.3
PT POC: 16.1 SECONDS
VALID INTERNAL CONTROL?: YES

## 2019-01-09 RX ORDER — ATENOLOL 25 MG/1
25 TABLET ORAL DAILY
Qty: 90 TAB | Refills: 1 | Status: SHIPPED | OUTPATIENT
Start: 2019-01-09 | End: 2019-12-27

## 2019-01-09 RX ORDER — FINASTERIDE 5 MG/1
5 TABLET, FILM COATED ORAL DAILY
Qty: 90 TAB | Refills: 1 | Status: SHIPPED | OUTPATIENT
Start: 2019-01-09 | End: 2019-07-08 | Stop reason: SDUPTHER

## 2019-01-09 NOTE — PROGRESS NOTES
[facility]    CC: F/U for Chronic Disease Management    HPI:     BPH:   - Has not been taking his terazosin as prescribed  - States he was confused about the instructions given to him at the last visit  - Reports that he continues to deal with urinary frequency      HTN:  - Taking his blood pressure medication as prescribed  - Denies any side effects or issues with medication  - Has not been exercising  - Does not check BP at home      Long term use of Anticoagulant due DVT Hx:  - Taking warfarin as prescribed  (5 mg on Wednesdays and 7 mg on all other days)  - Denies any side effects or issues with his medication  - Denies any issues with bleeding  - No changes in his diet      ROS: Positive items marked in RED  CON: fever, chills  Cardiovascular: palpitations, CP  Resp: SOB, cough, hemoptysis   GI: nausea, vomiting, diarrhea, hematochezia, melena, hematemesis  : dysuria, hematuria      Past Medical History:   Diagnosis Date    BPH (benign prostatic hyperplasia)     [facility] obstructive [facility] ([facility]     Hyperlipemia     Hypertension     IBS (irritable bowel syndrome)     [facility]     Thromboembolus ([facility]     lower extremity    Thyroid disease        No past surgical history on file. No family history on file. Social History     Socioeconomic History    Marital status:      Spouse name: Not on file    Number of children: Not on file    Years of education: Not on file    Highest education level: Not on file   Tobacco Use    Smoking status: Former Smoker    Smokeless tobacco: Never Used   Substance and Sexual Activity    Alcohol use: No    Drug use: No       No Known Allergies      Current Outpatient Medications:     finasteride (PROSCAR) 5 mg tablet, Take 1 Tab by mouth daily. , Disp: 90 Tab, Rfl: 1    atenolol (TENORMIN) 25 mg tablet, Take 1 Tab by mouth daily. , Disp: 90 Tab, Rfl: 1    terazosin (HYTRIN) 1 mg capsule, take 1 capsule by mouth at bedtime, Disp: 90 Cap, Rfl: 1    atorvastatin (LIPITOR) 40 mg tablet, Take 1 Tab by mouth daily. , Disp: 90 Tab, Rfl: 1    levothyroxine (SYNTHROID) 50 mcg tablet, Take 1 Tab by mouth Daily (before breakfast). , Disp: 90 Tab, Rfl: 1    warfarin (COUMADIN) 2 mg tablet, TAKE 4 TABLETS BY MOUTH ONCE DAILY, Disp: 120 Tab, Rfl: 0    omeprazole (PRILOSEC) 20 mg capsule, Take 1 Cap by mouth daily. , Disp: 90 Cap, Rfl: 1    acetaminophen (TYLENOL) 500 mg tablet, Take 2 Tabs by mouth every six (6) hours as needed for Pain., Disp: 90 Tab, Rfl: 1    amLODIPine (NORVASC) 10 mg tablet, Take 0.5 Tabs by mouth daily. , Disp: 90 Tab, Rfl: 1    albuterol-ipratropium (DUO-NEB) 2.5 mg-0.5 mg/3 ml nebu, 3 mL by Nebulization route every six (6) hours as needed. , Disp: 30 Nebule, Rfl: 12    fluticasone-salmeterol (ADVAIR) 500-50 mcg/dose diskus inhaler, Take 1 Puff by inhalation every twelve (12) hours. , Disp: 60 Each, Rfl: 3    Physical Exam:      /85   Pulse 79   Temp 99.2 °F (37.3 °C) (Oral)   Resp 20   SpO2 93%       General:  WD, WN, NAD, conversant  Eyes: sclera clear bilaterally, no discharge noted, eyelids normal in appearance  HENT: NCAT  Lungs: CTAB, normal respiratory effort and rate  CV: RRR, no MRGs  ABD: soft, non-tender, non-distended, normal bowel sounds  Skin: normal temperature, turgor, color, and texture  Psych: alert and oriented to person, place and situation, normal affect  Neuro: speech normal, moving all extremities    Results for Kathy Kerns (MRN [id number]):   Ref.  Range 1/9/2019 10:07   INR Unknown 1.3   Prothrombin time (POC) Latest Units: seconds 16.1       Assessment/Plan     HTN:  -Will defer doing any changes to his current blood pressure regimen  -Patient instructed to take terazosin as prescribed so we can verify the effect it will have to his blood pressure  -Follow-up in 1 month      BPH, Inadequately Controlled:  -Patient instructed to take terazosin as prescribed  -Follow-up in 1 month      Subtherapeutic INR  - Not at goal INR of 2-3  - Patient instructed to take 14 mg of warfarin today and then start taking 7 mg daily  -Follow-up in 1 week with nurse for INR check        Inocente Glaser MD  1/9/2019, 10:07 AM

## 2019-01-09 NOTE — PROGRESS NOTES
Arturo Ann is a 80 y.o. male who presents today for Anticoagulation monitoring. Dose:2mg  Indication: DVT  INR Goal: 2.0-3.0. Current dose:  Coumadin mg daily. Missed Coumadin Doses:  None  Medication Changes:  no  Dietary Changes:  yes - ate some greens for the holidays. Symptoms: taking coumadin appropriately without any bleeding. Latest INRs:  Lab Results   Component Value Date/Time    INR 1.9 (H) 11/09/2018 10:59 AM    INR 1.3 (H) 03/05/2010 04:25 AM    INR 1.2 03/04/2010 04:45 AM    INR POC 2.1 12/11/2018 11:24 AM    INR POC 2.2 11/27/2018 11:36 AM    INR POC 1.4 10/25/2018 12:21 PM    Prothrombin time 21.3 (H) 11/09/2018 10:59 AM    Prothrombin time 15.9 (H) 03/05/2010 04:25 AM    Prothrombin time 15.2 03/04/2010 04:45 AM        Chief Complaint   Patient presents with    Follow Up Chronic Condition     1. Have you been to the ER, urgent care clinic since your last visit? Hospitalized since your last visit? NO    2. Have you seen or consulted any other health care providers outside of the [address] [place] [address] since your last visit? Include any pap smears or colon screening.  Yes PULMONARY Dr for COPD    Visit Vitals  /85   Pulse 79   Temp 99.2 °F (37.3 °C) (Oral)   Resp 20   SpO2 93%

## 2019-01-25 DIAGNOSIS — E03.9 HYPOTHYROIDISM, UNSPECIFIED TYPE: ICD-10-CM

## 2019-01-25 RX ORDER — LEVOTHYROXINE SODIUM 50 UG/1
50 TABLET ORAL
Qty: 90 TAB | Refills: 1 | Status: CANCELLED | OUTPATIENT
Start: 2019-01-25

## 2019-02-26 ENCOUNTER — OFFICE VISIT (OUTPATIENT)
Dept: FAMILY MEDICINE CLINIC | Age: 84
End: 2019-02-26

## 2019-02-26 VITALS
HEART RATE: 78 BPM | OXYGEN SATURATION: 92 % | BODY MASS INDEX: 27.75 KG/M2 | SYSTOLIC BLOOD PRESSURE: 120 MMHG | RESPIRATION RATE: 22 BRPM | DIASTOLIC BLOOD PRESSURE: 76 MMHG | HEIGHT: 71 IN | TEMPERATURE: 98.6 F

## 2019-02-26 DIAGNOSIS — Z79.01 SUBTHERAPEUTIC ANTICOAGULATION: ICD-10-CM

## 2019-02-26 DIAGNOSIS — Z86.718 HISTORY OF DVT (DEEP VEIN THROMBOSIS): ICD-10-CM

## 2019-02-26 DIAGNOSIS — R06.02 SOB (SHORTNESS OF BREATH): ICD-10-CM

## 2019-02-26 DIAGNOSIS — R60.0 LOWER EXTREMITY EDEMA: ICD-10-CM

## 2019-02-26 DIAGNOSIS — N40.1 BENIGN PROSTATIC HYPERPLASIA WITH URINARY FREQUENCY: ICD-10-CM

## 2019-02-26 DIAGNOSIS — Z79.01 LONG TERM (CURRENT) USE OF ANTICOAGULANTS: ICD-10-CM

## 2019-02-26 DIAGNOSIS — I10 ESSENTIAL HYPERTENSION: Primary | ICD-10-CM

## 2019-02-26 DIAGNOSIS — Z51.81 SUBTHERAPEUTIC ANTICOAGULATION: ICD-10-CM

## 2019-02-26 DIAGNOSIS — R35.0 BENIGN PROSTATIC HYPERPLASIA WITH URINARY FREQUENCY: ICD-10-CM

## 2019-02-26 LAB
INR BLD: 1.6
PT POC: 19.2 SECONDS
VALID INTERNAL CONTROL?: YES

## 2019-02-26 NOTE — PROGRESS NOTES
Cony Botello is a 80 y.o. male who presents today for Anticoagulation monitoring. Indication: DVT  INR Goal: 2.0-3.0. Current dose:  Coumadin 7 mg daily. Missed Coumadin Doses:  None  Medication Changes:  no  Dietary Changes:  no    Symptoms: taking coumadin appropriately YES  GUMS BLEEDING. ASKED PATIENT WHAT KIND TOOTHBRUSH HE IS USING, PATIENT STATES HE'S USING HARD BRUSH. Explained patient that he needs to get soft brush. Latest INRs:  Lab Results   Component Value Date/Time    INR 1.9 (H) 11/09/2018 10:59 AM    INR 1.3 (H) 03/05/2010 04:25 AM    INR 1.2 03/04/2010 04:45 AM    INR POC 1.3 01/09/2019 10:07 AM    INR POC 2.1 12/11/2018 11:24 AM    INR POC 2.2 11/27/2018 11:36 AM    Prothrombin time 21.3 (H) 11/09/2018 10:59 AM    Prothrombin time 15.9 (H) 03/05/2010 04:25 AM    Prothrombin time 15.2 03/04/2010 04:45 AM        New Coumadin dose:.current treatment plan is effective, no change in therapy. 1. Have you been to the ER, urgent care clinic since your last visit? Hospitalized since your last visit? No    2. Have you seen or consulted any other health care providers outside of the [facility] since your last visit? Include any pap smears or colon screening.  No     Visit Vitals  /76   Pulse 78   Temp 98.6 °F (37 °C) (Oral)   Resp 22   Ht 5' 11\" (1.803 m)   SpO2 92%   BMI 27.75 kg/m²     Chief Complaint   Patient presents with    Follow Up Chronic Condition

## 2019-02-26 NOTE — PROGRESS NOTES
Jaylin Guzman    CC: F/U for Chronic Disease Management    HPI:      BPH:   - Has been taking his terazosin as prescribed  - Denies any side effects or issues the medication  - Is unsure if there has been a change in urinary frequency. Currently states it does not bother him.        HTN:  - Taking his blood pressure medication as prescribed  - Denies any side effects or issues with medication  - Has not been exercising  - Does not check his BP at home        Long term use of Anticoagulant due DVT Hx:  - Taking warfarin as prescribed  (7 mg daily)  - Denies any side effects or issues with his medication  - Denies any issues with bleeding  - No changes in his diet      ROS: Positive items marked in RED  CON: fever, chills  Cardiovascular: palpitations, CP, LE edema (Is a little worse)  Resp: SOB (Thinks it is 2/2 his COPD. Has been improving), cough, hemoptysis   GI: nausea, vomiting, diarrhea, hematochezia, melena, hematemesis  : dysuria, hematuria      Past Medical History:   Diagnosis Date    BPH (benign prostatic hyperplasia)     Chronic obstructive pulmonary disease (HCC)     Hyperlipemia     Hypertension     IBS (irritable bowel syndrome)     Recurrent deep vein thrombosis (DVT) (HCC)     Thromboembolus (HCC)     lower extremity    Thyroid disease        No past surgical history on file. No family history on file.     Social History     Socioeconomic History    Marital status:      Spouse name: Not on file    Number of children: Not on file    Years of education: Not on file    Highest education level: Not on file   Tobacco Use    Smoking status: Former Smoker    Smokeless tobacco: Never Used   Substance and Sexual Activity    Alcohol use: No    Drug use: No       No Known Allergies      Current Outpatient Medications:     warfarin (COUMADIN) 5 mg tablet, TAKE ONE 2 MG TABLET WITH ONE 5 MG TABLET BY MOUTH DAILY (TOTAL DAILY DOSE OF 7 MG), Disp: 90 Tab, Rfl: 0   warfarin (COUMADIN) 2 mg tablet, TAKE ONE 2 MG TABLET WITH ONE 5 MG TABLET BY MOUTH DAILY (TOTAL DAILY DOSE OF 7 MG), Disp: 90 Tab, Rfl: 0    finasteride (PROSCAR) 5 mg tablet, Take 1 Tab by mouth daily. , Disp: 90 Tab, Rfl: 1    atenolol (TENORMIN) 25 mg tablet, Take 1 Tab by mouth daily. , Disp: 90 Tab, Rfl: 1    terazosin (HYTRIN) 1 mg capsule, take 1 capsule by mouth at bedtime, Disp: 90 Cap, Rfl: 1    atorvastatin (LIPITOR) 40 mg tablet, Take 1 Tab by mouth daily. , Disp: 90 Tab, Rfl: 1    levothyroxine (SYNTHROID) 50 mcg tablet, Take 1 Tab by mouth Daily (before breakfast). , Disp: 90 Tab, Rfl: 1    omeprazole (PRILOSEC) 20 mg capsule, Take 1 Cap by mouth daily. , Disp: 90 Cap, Rfl: 1    acetaminophen (TYLENOL) 500 mg tablet, Take 2 Tabs by mouth every six (6) hours as needed for Pain., Disp: 90 Tab, Rfl: 1    amLODIPine (NORVASC) 10 mg tablet, Take 0.5 Tabs by mouth daily. , Disp: 90 Tab, Rfl: 1    albuterol-ipratropium (DUO-NEB) 2.5 mg-0.5 mg/3 ml nebu, 3 mL by Nebulization route every six (6) hours as needed. , Disp: 30 Nebule, Rfl: 12    fluticasone-salmeterol (ADVAIR) 500-50 mcg/dose diskus inhaler, Take 1 Puff by inhalation every twelve (12) hours. , Disp: 60 Each, Rfl: 3    Physical Exam:      /76   Pulse 78   Temp 98.6 °F (37 °C) (Oral)   Resp 22   Ht 5' 11\" (1.803 m)   SpO2 92%   BMI 27.75 kg/m²     General:  WD, WN, NAD, conversant  Eyes: sclera clear bilaterally, no discharge noted, eyelids normal in appearance  HENT: NCAT, nasal turbinates, oropharynx clear, MMM  Neck: supple, no lymphadenopathy  Lungs: CTAB, normal respiratory effort and rate  CV: RRR, no MRGs  ABD: soft, non-tender, non-distended, normal bowel sounds  Ext: 2+ pitting edema in LE bilaterally  Skin: normal temperature, turgor, color, and texture  Psych: alert and oriented to person, place and situation, normal affect  Neuro: speech normal, moving all extremities, gait normal    Results for Cielo Kellogg (MRN [id number]):   Ref.  Range 2/26/2019 15:44   INR Unknown 1.6   Prothrombin time (POC) Latest Units: seconds 19.2       Assessment/Plan     HTN, Well Controlled:  -Will continue current blood pressure medication regimen  -Follow-up in 2 weeks for INR visit      BPH:  -Will continue current medication regimen  -Advised to follow-up as needed, if symptoms start to bother him  -Follow-up in 2 weeks for INR visit       Lower Extremity Edema:  -Worsening lower extremity edema in setting of recent issues with shortness of breath is concerning for possible heart failure  -Echocardiogram ordered  -Instructed to follow-up as needed if shortness of breath becomes an issue again  -Follow-up in 2 weeks for INR visit       Subtherapeutic INR, Improving   -Not at goal INR of 2-3  -Patient instructed to start taking 10 mg of Warfarin on Wednesdays and 7 mg on all other days  -Follow-up in 2 weeks for INR visit          Pk Silva MD  2/26/2019, 2:15 PM

## 2019-03-12 ENCOUNTER — CLINICAL SUPPORT (OUTPATIENT)
Dept: FAMILY MEDICINE CLINIC | Age: 84
End: 2019-03-12

## 2019-03-12 DIAGNOSIS — Z79.01 LONG TERM CURRENT USE OF ANTICOAGULANTS WITH INR GOAL OF 2.0-3.0: Primary | ICD-10-CM

## 2019-03-12 LAB
INR BLD: 1.4
PT POC: 17.4 SECONDS
VALID INTERNAL CONTROL?: YES

## 2019-03-12 NOTE — PROGRESS NOTES
Clemente Valencia is a 80 y.o. male who presents today for Anticoagulation monitoring. Indication: INR 1.4; PT 17.4  INR Goal: 2.0 - 3.0  Current dose:  Coumadin 7mg for 6 days in a week; then 10 mg for 1 day in a week. Missed Coumadin Doses: None  Medication Changes: No changes  Dietary Changes: No changes    Symptoms: taking coumadin appropriately without any bleeding. Latest INRs:  Lab Results   Component Value Date/Time    INR 1.9 (H) 11/09/2018 10:59 AM    INR 1.3 (H) 03/05/2010 04:25 AM    INR 1.2 03/04/2010 04:45 AM    INR POC 1.6 02/26/2019 03:44 PM    INR POC 1.3 01/09/2019 10:07 AM    INR POC 2.1 12/11/2018 11:24 AM    Prothrombin time 21.3 (H) 11/09/2018 10:59 AM    Prothrombin time 15.9 (H) 03/05/2010 04:25 AM    Prothrombin time 15.2 03/04/2010 04:45 AM        New Coumadin dose: Take two 7mg today 3/12/2019, 3 day of 10 mg and 4 days of 7 mg. Next check to be scheduled for 1 weeks.

## 2019-03-15 DIAGNOSIS — K21.9 GASTROESOPHAGEAL REFLUX DISEASE, ESOPHAGITIS PRESENCE NOT SPECIFIED: ICD-10-CM

## 2019-03-15 RX ORDER — OMEPRAZOLE 20 MG/1
CAPSULE, DELAYED RELEASE ORAL
Qty: 90 CAP | Refills: 1 | Status: SHIPPED | OUTPATIENT
Start: 2019-03-15 | End: 2019-09-05 | Stop reason: SDUPTHER

## 2019-03-19 ENCOUNTER — CLINICAL SUPPORT (OUTPATIENT)
Dept: FAMILY MEDICINE CLINIC | Age: 84
End: 2019-03-19

## 2019-03-19 DIAGNOSIS — Z79.01 LONG TERM (CURRENT) USE OF ANTICOAGULANTS: Primary | ICD-10-CM

## 2019-03-19 LAB
INR BLD: 2.6
PT POC: 30.7 SECONDS
VALID INTERNAL CONTROL?: YES

## 2019-03-19 NOTE — PROGRESS NOTES
Taylor Macdonald is a 80 y.o. male who presents today for Anticoagulation monitoring. Indication: Atrial Fibrillation  INR Goal: 2.0-3.0. Current dose: Take two 7mg today 3/12/2019, 3 day of 10 mg and 4 days of 7 mg     . Missed Coumadin Doses:  None  Medication Changes:  no  Dietary Changes:  no    Symptoms: taking coumadin appropriately without any bleeding. Latest INRs:  Lab Results   Component Value Date/Time    INR 1.9 (H) 11/09/2018 10:59 AM    INR 1.3 (H) 03/05/2010 04:25 AM    INR 1.2 03/04/2010 04:45 AM    INR POC 2.6 03/19/2019 11:31 AM    INR POC 1.4 03/12/2019 11:10 AM    INR POC 1.6 02/26/2019 03:44 PM    Prothrombin time 21.3 (H) 11/09/2018 10:59 AM    Prothrombin time 15.9 (H) 03/05/2010 04:25 AM    Prothrombin time 15.2 03/04/2010 04:45 AM        New Coumadin dose:. Coumadin 10mg for Mon, Wed and Friday. Coumdin 7mg for Tues,Thurday, Sat, and Sunday     Next check to be scheduled for  1 weeks.

## 2019-03-22 ENCOUNTER — HOSPITAL ENCOUNTER (OUTPATIENT)
Dept: NON INVASIVE DIAGNOSTICS | Age: 84
Discharge: HOME | End: 2019-03-22
Attending: FAMILY MEDICINE
Payer: COMMERCIAL

## 2019-03-22 VITALS
HEIGHT: 71 IN | DIASTOLIC BLOOD PRESSURE: 74 MMHG | WEIGHT: 199 LBS | BODY MASS INDEX: 27.86 KG/M2 | SYSTOLIC BLOOD PRESSURE: 128 MMHG

## 2019-03-22 DIAGNOSIS — R06.02 SOB (SHORTNESS OF BREATH): ICD-10-CM

## 2019-03-22 DIAGNOSIS — R60.0 LOWER EXTREMITY EDEMA: ICD-10-CM

## 2019-03-22 LAB
ECHO AO ROOT DIAM: 3.45 CM
ECHO AV PEAK GRADIENT: 0 MMHG
ECHO AV PEAK VELOCITY: 0 CM/S
ECHO EST RA PRESSURE: 10 MMHG
ECHO LA VOL 2C: 68.43 ML (ref 18–58)
ECHO LA VOL 4C: 36.43 ML (ref 18–58)
ECHO LA VOLUME INDEX A2C: 32.52 ML/M2 (ref 16–28)
ECHO LA VOLUME INDEX A4C: 17.31 ML/M2 (ref 16–28)
ECHO LV EDV A2C: 123.7 ML
ECHO LV EDV A4C: 98 ML
ECHO LV EDV BP: 113.6 ML (ref 67–155)
ECHO LV EDV INDEX A4C: 46.6 ML/M2
ECHO LV EDV INDEX BP: 54 ML/M2
ECHO LV EDV NDEX A2C: 58.8 ML/M2
ECHO LV EJECTION FRACTION A2C: 49 %
ECHO LV EJECTION FRACTION A4C: 58 %
ECHO LV EJECTION FRACTION BIPLANE: 54.5 % (ref 55–100)
ECHO LV ESV A2C: 63.7 ML
ECHO LV ESV A4C: 41.5 ML
ECHO LV ESV BP: 51.7 ML (ref 22–58)
ECHO LV ESV INDEX A2C: 30.3 ML/M2
ECHO LV ESV INDEX A4C: 19.7 ML/M2
ECHO LV ESV INDEX BP: 24.6 ML/M2
ECHO LV INTERNAL DIMENSION DIASTOLIC: 4.57 CM (ref 4.2–5.9)
ECHO LV INTERNAL DIMENSION SYSTOLIC: 3.86 CM
ECHO LV IVSD: 1.6 CM (ref 0.6–1)
ECHO LV MASS 2D: 269.1 G (ref 88–224)
ECHO LV MASS INDEX 2D: 127.9 G/M2 (ref 49–115)
ECHO LV POSTERIOR WALL DIASTOLIC: 0.99 CM (ref 0.6–1)
ECHO LVOT DIAM: 1.95 CM
ECHO LVOT PEAK GRADIENT: 5.4 MMHG
ECHO LVOT PEAK VELOCITY: 116.71 CM/S
ECHO MV A VELOCITY: 89.52 CM/S
ECHO MV AREA PHT: 6.8 CM2
ECHO MV E DECELERATION TIME (DT): 111.9 MS
ECHO MV E VELOCITY: 56.25 CM/S
ECHO MV E/A RATIO: 0.63
ECHO MV PRESSURE HALF TIME (PHT): 32.5 MS
ECHO RIGHT VENTRICULAR SYSTOLIC PRESSURE (RVSP): 11.2 MMHG
ECHO TV REGURGITANT MAX VELOCITY: -54 CM/S
ECHO TV REGURGITANT PEAK GRADIENT: 1.2 MMHG

## 2019-03-22 PROCEDURE — 93306 TTE W/DOPPLER COMPLETE: CPT

## 2019-03-26 ENCOUNTER — CLINICAL SUPPORT (OUTPATIENT)
Dept: FAMILY MEDICINE CLINIC | Age: 84
End: 2019-03-26

## 2019-03-26 DIAGNOSIS — Z79.01 LONG TERM (CURRENT) USE OF ANTICOAGULANTS: Primary | ICD-10-CM

## 2019-03-26 LAB
INR BLD: 2.7
PT POC: 32.5 SECONDS
VALID INTERNAL CONTROL?: YES

## 2019-03-26 NOTE — PROGRESS NOTES
Summer Cruz is a 80 y.o. male who presents today for Anticoagulation monitoring. Results: INR 2.7; PT 32.5  INR Goal: 2.0 - 3.0  Current dose:  Coumadin 10mg for Mon, Wed and Friday. Coumdin 7mg for Tues,Thurday, Sat, and Sunday   Missed Coumadin Doses: None  Medication Changes: No changes  Dietary Changes: Not eating much solid food due to tooth ache (plan to have tooth pulled). Patient stated that he has been drinking Boost drinks for nutrients. Symptoms: taking coumadin appropriately without bleeding. Latest INRs:  Lab Results   Component Value Date/Time    INR 1.9 (H) 11/09/2018 10:59 AM    INR 1.3 (H) 03/05/2010 04:25 AM    INR 1.2 03/04/2010 04:45 AM    INR POC 2.6 03/19/2019 11:31 AM    INR POC 1.4 03/12/2019 11:10 AM    INR POC 1.6 02/26/2019 03:44 PM    Prothrombin time 21.3 (H) 11/09/2018 10:59 AM    Prothrombin time 15.9 (H) 03/05/2010 04:25 AM    Prothrombin time 15.2 03/04/2010 04:45 AM        New Coumadin dose:current treatment plan is effective, no change in therapy. Next check to be scheduled for 2 week.

## 2019-04-01 DIAGNOSIS — E78.5 HYPERLIPIDEMIA, UNSPECIFIED HYPERLIPIDEMIA TYPE: ICD-10-CM

## 2019-04-03 RX ORDER — ATORVASTATIN CALCIUM 40 MG/1
TABLET, FILM COATED ORAL
Qty: 90 TAB | Refills: 1 | Status: SHIPPED | OUTPATIENT
Start: 2019-04-03 | End: 2019-10-04 | Stop reason: SDUPTHER

## 2019-04-04 ENCOUNTER — TELEPHONE (OUTPATIENT)
Dept: FAMILY MEDICINE CLINIC | Age: 84
End: 2019-04-04

## 2019-04-04 NOTE — TELEPHONE ENCOUNTER
Mr. Macias  called to inform that he was scheduled to have his tooth pulled a couple days ago and was told once the dentist realized that he is taking a blood thinner, that he cannot have his tooth pulled. Patient would like to know what can he do because he really needs to have his tooth pulled. Should he stop taking the blood thinner in order to get tooth pulled?  Please advise

## 2019-04-05 NOTE — TELEPHONE ENCOUNTER
Patient stated that he would like to have a letter/note stating that he can continue his blood thinner and still have his tooth pulled.

## 2019-04-08 ENCOUNTER — TELEPHONE (OUTPATIENT)
Dept: FAMILY MEDICINE CLINIC | Age: 84
End: 2019-04-08

## 2019-04-08 NOTE — TELEPHONE ENCOUNTER
Patient called and stated that a  called him from our office saying that he did not have to come to his appointment for his INR check tomorrow. Patient made aware that he is scheduled for 4/9/2019 at 10:30 AM and advised to come in and have his INR checked. Patient verbalized understanding.

## 2019-04-09 ENCOUNTER — CLINICAL SUPPORT (OUTPATIENT)
Dept: FAMILY MEDICINE CLINIC | Age: 84
End: 2019-04-09

## 2019-04-09 DIAGNOSIS — Z79.01 LONG TERM CURRENT USE OF ANTICOAGULANTS WITH INR GOAL OF 2.0-3.0: Primary | ICD-10-CM

## 2019-04-09 LAB
INR BLD: 1.6
PT POC: 19.4 SECONDS
VALID INTERNAL CONTROL?: YES

## 2019-04-09 NOTE — LETTER
Dental Clearance 4/9/2019 10:49 AM 
 
Mr. Jose Vogt [address] To Whom It May Concern: 
 
Jose Vogt is currently under the care of Ashtyn Becker. 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 He has been compliant with his medication and his regular INR checks. There are currently no indications for him to hold his anticoagulation therapy. He is clear for any needed dental procedure. Please see his recent INRs below. Results for Kaylene Velásquez (MRN [id number]): 
 Ref. Range 3/19/2019 11:31 3/26/2019 11:16 4/9/2019 11:33 INR Unknown 2.6 2.7 1.6 Prothrombin time (POC) Latest Units: seconds 30.7 32.5 19.4 If there are questions or concerns please have the patient contact our office. Sincerely, Aislinn Cali MD

## 2019-04-09 NOTE — PROGRESS NOTES
Andrea Pierce is a 80 y.o. male who presents today for Anticoagulation monitoring. Results: INR 1.6; PT 19.4  INR Goal: 2.0 - 3.0  Current dose: Coumadin 10mg for Mon, Wed and Friday. Coumdin 7mg for Tues,Thurday, Sat, and Sunday  Missed Coumadin Doses: None  Medication Changes:  None  Dietary Changes: Patient stated that he still is not eating much solid food due to tooth ache (still plans to have tooth pulled). Patient stated that he has been drinking Boost drinks for nutrients. Symptoms: taking coumadin appropriately without any symptoms. Latest INRs:  Lab Results   Component Value Date/Time    INR 1.9 (H) 11/09/2018 10:59 AM    INR 1.3 (H) 03/05/2010 04:25 AM    INR 1.2 03/04/2010 04:45 AM    INR POC 2.7 03/26/2019 11:16 AM    INR POC 2.6 03/19/2019 11:31 AM    INR POC 1.4 03/12/2019 11:10 AM    Prothrombin time 21.3 (H) 11/09/2018 10:59 AM    Prothrombin time 15.9 (H) 03/05/2010 04:25 AM    Prothrombin time 15.2 03/04/2010 04:45 AM        New Coumadin dose: Take 10 mg on Mondays, Wednesdays, Fridays and Sundays; Take 7 mg on Tuesdays and Thursdays. Next check to be scheduled for 2 weeks.

## 2019-04-16 ENCOUNTER — CLINICAL SUPPORT (OUTPATIENT)
Dept: FAMILY MEDICINE CLINIC | Age: 84
End: 2019-04-16

## 2019-04-16 DIAGNOSIS — Z79.01 LONG TERM (CURRENT) USE OF ANTICOAGULANTS: Primary | ICD-10-CM

## 2019-04-16 LAB
INR BLD: 1.9
PT POC: 22.4 SECONDS
VALID INTERNAL CONTROL?: YES

## 2019-04-16 NOTE — PROGRESS NOTES
Regino Brady is a 80 y.o. male who presents today for Anticoagulation monitoring. Results:  INR 1.9; PT 22.4  INR Goal: 2.0 - 3.0  Current dose: Take 10 mg on Mondays, Wednesdays, Fridays and Sundays; Take 7 mg on Tuesdays and Thursdays. Starting April 12, 2019 take 7 mg everyday. Missed Coumadin Doses:  None  Medication Changes:  None  Dietary Changes: Patient stated that he still is not eating much solid food due to tooth ache (still plans to have tooth pulled). Patient stated that he has been drinking Boost drinks for nutrients.       Symptoms: taking coumadin appropriately without bleeding. Latest INRs:  Lab Results   Component Value Date/Time    INR 1.9 (H) 11/09/2018 10:59 AM    INR 1.3 (H) 03/05/2010 04:25 AM    INR 1.2 03/04/2010 04:45 AM    INR POC 1.6 04/09/2019 11:33 AM    INR POC 2.7 03/26/2019 11:16 AM    INR POC 2.6 03/19/2019 11:31 AM    Prothrombin time 21.3 (H) 11/09/2018 10:59 AM    Prothrombin time 15.9 (H) 03/05/2010 04:25 AM    Prothrombin time 15.2 03/04/2010 04:45 AM        New Coumadin dose: Stop taking  Coumadin and begin taking Eliquis as prescribed. Next check: Follow up in 1 month.

## 2019-04-27 DIAGNOSIS — I10 ESSENTIAL HYPERTENSION: ICD-10-CM

## 2019-05-03 RX ORDER — AMLODIPINE BESYLATE 10 MG/1
TABLET ORAL
Qty: 90 TAB | Refills: 1 | Status: SHIPPED | OUTPATIENT
Start: 2019-05-03 | End: 2019-11-08 | Stop reason: SDUPTHER

## 2019-05-16 ENCOUNTER — OFFICE VISIT (OUTPATIENT)
Dept: FAMILY MEDICINE CLINIC | Age: 84
End: 2019-05-16

## 2019-05-16 VITALS
SYSTOLIC BLOOD PRESSURE: 112 MMHG | TEMPERATURE: 99.2 F | HEIGHT: 71 IN | RESPIRATION RATE: 12 BRPM | DIASTOLIC BLOOD PRESSURE: 74 MMHG | HEART RATE: 70 BPM | WEIGHT: 199 LBS | OXYGEN SATURATION: 95 % | BODY MASS INDEX: 27.86 KG/M2

## 2019-05-16 DIAGNOSIS — D52.9 ANEMIA DUE TO FOLIC ACID DEFICIENCY, UNSPECIFIED DEFICIENCY TYPE: Primary | ICD-10-CM

## 2019-05-16 DIAGNOSIS — Z95.828 S/P IVC FILTER: ICD-10-CM

## 2019-05-16 DIAGNOSIS — E03.9 HYPOTHYROIDISM, UNSPECIFIED TYPE: ICD-10-CM

## 2019-05-16 DIAGNOSIS — I10 ESSENTIAL HYPERTENSION: ICD-10-CM

## 2019-05-16 DIAGNOSIS — J44.9 CHRONIC OBSTRUCTIVE PULMONARY DISEASE, UNSPECIFIED COPD TYPE (HCC): ICD-10-CM

## 2019-05-16 DIAGNOSIS — E78.5 HYPERLIPIDEMIA, UNSPECIFIED HYPERLIPIDEMIA TYPE: ICD-10-CM

## 2019-05-16 RX ORDER — IPRATROPIUM BROMIDE AND ALBUTEROL SULFATE 2.5; .5 MG/3ML; MG/3ML
3 SOLUTION RESPIRATORY (INHALATION)
Qty: 30 NEBULE | Refills: 12 | Status: SHIPPED | OUTPATIENT
Start: 2019-05-16 | End: 2020-04-27

## 2019-05-16 RX ORDER — FOLIC ACID 1 MG/1
1 TABLET ORAL DAILY
Qty: 90 TAB | Refills: 1 | Status: SHIPPED | OUTPATIENT
Start: 2019-05-16 | End: 2019-08-07 | Stop reason: ALTCHOICE

## 2019-05-16 RX ORDER — LEVOTHYROXINE SODIUM 50 UG/1
50 TABLET ORAL
Qty: 90 TAB | Refills: 1 | Status: SHIPPED | OUTPATIENT
Start: 2019-05-16 | End: 2019-10-23 | Stop reason: SDUPTHER

## 2019-05-16 NOTE — PROGRESS NOTES
1. Have you been to the ER, urgent care clinic since your last visit? Hospitalized since your last visit? No    2. Have you seen or consulted any other health care providers outside of the [address] [place] [address] since your last visit? Include any pap smears or colon screening.  No

## 2019-05-16 NOTE — PROGRESS NOTES
[facility]    CC: F/U for chronic disease management    HPI:     Macrocytic Anemia:  -Got requested CBC, vitamin B12, and folate level      HTN:  -Taking BP medication as prescribed  -Denies any side effects or issues with medication  -Does not exercise or check BP at home      Hypothyroidism:  -TSH last checked on 6/21/2018 and was normal  -Taking synthroid as prescribed  -Denies any side effects or issues with medication      HLD:  -Taking atorvastatin as prescribed  -Denies any side effects or issues with medication      ROS: Positive items marked in RED  CON: fever, chills  Cardiovascular: palpitations, CP  Resp: SOB, cough  GI: nausea, vomiting, diarrhea  : dysuria, hematuria      Past Medical History:   Diagnosis Date    BPH (benign prostatic hyperplasia)     Chronic obstructive pulmonary disease (HCC)     Hyperlipemia     Hypertension     IBS (irritable bowel syndrome)     Recurrent deep vein thrombosis (DVT) ([facility]     Thromboembolus (HCC)     lower extremity    Thyroid disease        No past surgical history on file. No family history on file.     Social History     Socioeconomic History    Marital status:      Spouse name: Not on file    Number of children: Not on file    Years of education: Not on file    Highest education level: Not on file   Tobacco Use    Smoking status: Former Smoker    Smokeless tobacco: Never Used   Substance and Sexual Activity    Alcohol use: No    Drug use: No       No Known Allergies      Current Outpatient Medications:     amLODIPine (NORVASC) 10 mg tablet, TAKE 1/2 TABLET BY MOUTH DAILY, Disp: 90 Tab, Rfl: 1    apixaban (ELIQUIS) 2.5 mg tablet, Take 1 Tab by mouth two (2) times a day., Disp: 60 Tab, Rfl: 3    atorvastatin (LIPITOR) 40 mg tablet, take 1 tablet by mouth once daily, Disp: 90 Tab, Rfl: 1    omeprazole (PRILOSEC) 20 mg capsule, take 1 capsule once daily, Disp: 90 Cap, Rfl: 1    finasteride (PROSCAR) 5 mg tablet, Take 1 Tab by mouth daily. , Disp: 90 Tab, Rfl: 1    atenolol (TENORMIN) 25 mg tablet, Take 1 Tab by mouth daily. , Disp: 90 Tab, Rfl: 1    terazosin (HYTRIN) 1 mg capsule, take 1 capsule by mouth at bedtime, Disp: 90 Cap, Rfl: 1    levothyroxine (SYNTHROID) 50 mcg tablet, Take 1 Tab by mouth Daily (before breakfast). , Disp: 90 Tab, Rfl: 1    acetaminophen (TYLENOL) 500 mg tablet, Take 2 Tabs by mouth every six (6) hours as needed for Pain., Disp: 90 Tab, Rfl: 1    albuterol-ipratropium (DUO-NEB) 2.5 mg-0.5 mg/3 ml nebu, 3 mL by Nebulization route every six (6) hours as needed. , Disp: 30 Nebule, Rfl: 12    fluticasone-salmeterol (ADVAIR) 500-50 mcg/dose diskus inhaler, Take 1 Puff by inhalation every twelve (12) hours. , Disp: 60 Each, Rfl: 3    Physical Exam:      /74   Pulse 70   Temp 99.2 °F (37.3 °C) (Oral)   Resp 12   Ht 5' 11\" (1.803 m)   Wt 199 lb (90.3 kg)   SpO2 95%   BMI 27.75 kg/m²     General:  WD, WN, NAD, conversant  Eyes: sclera clear bilaterally, no discharge noted, eyelids normal in appearance  HENT: NCAT, nasal turbinates, oropharynx clear, MMM  Neck: supple, no lymphadenopathy  Lungs: CTAB, normal respiratory effort and rate  CV: RRR, no MRGs  ABD: soft, non-tender, non-distended, normal bowel sounds  Ext: 2+ pitting edema in LE bilaterally  Skin: normal temperature, turgor, color, and texture  Psych: alert and oriented to person, place and situation, normal affect  Neuro: speech normal, moving all extremities, gait normal      Results for Halima Gross (MRN [id number]):   Ref.  Range 7/5/2018 11:55   WBC Latest Ref Range: 4.6 - 13.2 K/uL 3.2 (L)   RBC Latest Ref Range: 4.70 - 5.50 M/uL 3.11 (L)   HGB Latest Ref Range: 13.0 - 16.0 g/dL 10.9 (L)   HCT Latest Ref Range: 36.0 - 48.0 % 33.9 (L)   MCV Latest Ref Range: 74.0 - 97.0 .0 (H)   MCH Latest Ref Range: 24.0 - 34.0 PG 35.0 (H)   MCHC Latest Ref Range: 31.0 - 37.0 g/dL 32.2   RDW Latest Ref Range: 11.6 - 14.5 % 14.2   PLATELET Latest Ref Range: 135 - 420 K/uL 167   MPV Latest Ref Range: 9.2 - 11.8 FL 11.0   NEUTROPHILS Latest Ref Range: 40 - 73 % 47   LYMPHOCYTES Latest Ref Range: 21 - 52 % 28   MONOCYTES Latest Ref Range: 3 - 10 % 13 (H)   EOSINOPHILS Latest Ref Range: 0 - 5 % 11 (H)   BASOPHILS Latest Ref Range: 0 - 2 % 1   DF Latest Units:   AUTOMATED   ABS. NEUTROPHILS Latest Ref Range: 1.8 - 8.0 K/UL 1.6 (L)   ABS. LYMPHOCYTES Latest Ref Range: 0.9 - 3.6 K/UL 0.9   ABS. MONOCYTES Latest Ref Range: 0.05 - 1.2 K/UL 0.4   ABS. EOSINOPHILS Latest Ref Range: 0.0 - 0.4 K/UL 0.4   ABS.  BASOPHILS Latest Ref Range: 0.0 - 0.06 K/UL 0.0   Vitamin B12 Latest Ref Range: 211 - 911 pg/mL 749   Folate Latest Ref Range: 3.10 - 17.50 ng/mL 18.9 (H)       Assessment/Plan     HTN, Well Controlled:  -CBC, CMP, and lipid panel ordered  -Will continue current BP medication regimen  -F/U in 2 months      Hypothyroidism:  -TSH level ordered  -Will continue current synthroid regimen  -F/U in 2 months      HLD:  -Will continue current statin regimen  -Lipid panel ordered  -F/U in 2 months      Folic Acid Deficiency Anemia:  -Patient counseled on diagnosis and recommended treatment  -Started on folic acid regimen  -CBC ordered  -Handout given on folate Deficiency anemia  -F/U in 2 months        Celena White MD  5/16/2019, 1:29 PM

## 2019-05-16 NOTE — PATIENT INSTRUCTIONS
Folate Deficiency Anemia: Care Instructions  Your Care Instructions    Folate is a B vitamin. It's also called folic acid. Your body uses it to make red blood cells. Red blood cells carry oxygen around the body. When you don't have enough folate, your body has a hard time making red blood cells. This can cause a problem called anemia. Anemia means that you don't have enough red blood cells. You can take a folate supplement every day to treat this kind of anemia. In most cases, it gets better in 5 to 7 days. But if you have another health problem, your doctor may want you to keep taking folate after the anemia goes away. Follow-up care is a key part of your treatment and safety. Be sure to make and go to all appointments, and call your doctor if you are having problems. It's also a good idea to know your test results and keep a list of the medicines you take. How can you care for yourself at home? · Be safe with medicines. Take your medicines exactly as prescribed. Call your doctor if you think you are having a problem with your medicine. · Ask your doctor how much folate you need every day. Eat foods that are high in folate. ? Foods high in folate include fortified breakfast cereals and breads. They also include liver, avocado, asparagus, spinach, strawberries, oranges and orange juice, and wheat germ. You can read food labels to see how much folate is in food. ? Eat vegetables raw or lightly steamed. This is best way to prepare them if you want to get as much folate as possible. · Ask your doctor if you should take a daily vitamin that includes folic acid. When should you call for help? Call 911 anytime you think you may need emergency care.  For example, call if:    · You passed out (lost consciousness).    Call your doctor now or seek immediate medical care if:    · You are dizzy or lightheaded, or you feel like you may faint.     · Your fatigue and weakness continue or get worse.   Maykel Herron closely for changes in your health, and be sure to contact your doctor if:    · You are confused or can't think clearly.     · You think you might be pregnant.     · You do not get better as expected. Where can you learn more? Go to http://bienvenido-bee.info/. Enter A681 in the search box to learn more about \"Folate Deficiency Anemia: Care Instructions. \"  Current as of: May 6, 2018  Content Version: 11.9  © 5756-1988 [employer]. Care instructions adapted under license by [employer] (which disclaims liability or warranty for this information). If you have questions about a medical condition or this instruction, always ask your healthcare professional. [address] any warranty or liability for your use of this information.

## 2019-05-29 PROBLEM — D52.9 ANEMIA DUE TO FOLIC ACID DEFICIENCY: Status: ACTIVE | Noted: 2019-05-29

## 2019-05-29 PROBLEM — E78.5 HYPERLIPIDEMIA: Status: ACTIVE | Noted: 2019-05-29

## 2019-06-25 DIAGNOSIS — R35.0 BENIGN PROSTATIC HYPERPLASIA WITH URINARY FREQUENCY: ICD-10-CM

## 2019-06-25 DIAGNOSIS — N40.1 BENIGN PROSTATIC HYPERPLASIA WITH URINARY FREQUENCY: ICD-10-CM

## 2019-07-03 RX ORDER — TERAZOSIN 1 MG/1
CAPSULE ORAL
Qty: 90 CAP | Refills: 1 | Status: SHIPPED | OUTPATIENT
Start: 2019-07-03 | End: 2019-12-27

## 2019-07-08 DIAGNOSIS — N40.1 BENIGN PROSTATIC HYPERPLASIA WITH URINARY FREQUENCY: ICD-10-CM

## 2019-07-08 DIAGNOSIS — R35.0 BENIGN PROSTATIC HYPERPLASIA WITH URINARY FREQUENCY: ICD-10-CM

## 2019-07-09 RX ORDER — FINASTERIDE 5 MG/1
5 TABLET, FILM COATED ORAL DAILY
Qty: 90 TAB | Refills: 1 | Status: SHIPPED | OUTPATIENT
Start: 2019-07-09 | End: 2020-01-02 | Stop reason: SDUPTHER

## 2019-07-09 NOTE — TELEPHONE ENCOUNTER
Last appt was 5-16-19  Next appt is 7-16-19    Last refill of this medication was   1-9-19 #90 w 1 rf

## 2019-07-23 ENCOUNTER — OFFICE VISIT (OUTPATIENT)
Dept: FAMILY MEDICINE CLINIC | Age: 84
End: 2019-07-23

## 2019-07-23 ENCOUNTER — HOSPITAL ENCOUNTER (OUTPATIENT)
Dept: LAB | Age: 84
Discharge: HOME | End: 2019-07-23
Payer: COMMERCIAL

## 2019-07-23 VITALS
HEART RATE: 80 BPM | SYSTOLIC BLOOD PRESSURE: 126 MMHG | BODY MASS INDEX: 27.86 KG/M2 | WEIGHT: 199 LBS | HEIGHT: 71 IN | RESPIRATION RATE: 16 BRPM | DIASTOLIC BLOOD PRESSURE: 82 MMHG | TEMPERATURE: 97.5 F | OXYGEN SATURATION: 94 %

## 2019-07-23 DIAGNOSIS — E03.9 HYPOTHYROIDISM, UNSPECIFIED TYPE: ICD-10-CM

## 2019-07-23 DIAGNOSIS — I10 ESSENTIAL HYPERTENSION: ICD-10-CM

## 2019-07-23 DIAGNOSIS — E78.5 HYPERLIPIDEMIA, UNSPECIFIED HYPERLIPIDEMIA TYPE: ICD-10-CM

## 2019-07-23 LAB
ALBUMIN SERPL-MCNC: 3.1 G/DL (ref 3.4–5)
ALBUMIN/GLOB SERPL: 0.9 {RATIO} (ref 0.8–1.7)
ALP SERPL-CCNC: 74 U/L (ref 45–117)
ALT SERPL-CCNC: 60 U/L (ref 16–61)
ANION GAP SERPL CALC-SCNC: 6 MMOL/L (ref 3–18)
AST SERPL-CCNC: 36 U/L (ref 10–38)
BASOPHILS # BLD: 0 K/UL (ref 0–0.1)
BASOPHILS NFR BLD: 0 % (ref 0–2)
BILIRUB SERPL-MCNC: 0.3 MG/DL (ref 0.2–1)
BUN SERPL-MCNC: 9 MG/DL (ref 7–18)
BUN/CREAT SERPL: 8 (ref 12–20)
CALCIUM SERPL-MCNC: 8.5 MG/DL (ref 8.5–10.1)
CHLORIDE SERPL-SCNC: 113 MMOL/L (ref 100–111)
CHOLEST SERPL-MCNC: 125 MG/DL
CO2 SERPL-SCNC: 25 MMOL/L (ref 21–32)
CREAT SERPL-MCNC: 1.15 MG/DL (ref 0.6–1.3)
DIFFERENTIAL METHOD BLD: ABNORMAL
EOSINOPHIL # BLD: 0.2 K/UL (ref 0–0.4)
EOSINOPHIL NFR BLD: 7 % (ref 0–5)
ERYTHROCYTE [DISTWIDTH] IN BLOOD BY AUTOMATED COUNT: 14.3 % (ref 11.6–14.5)
GLOBULIN SER CALC-MCNC: 3.6 G/DL (ref 2–4)
GLUCOSE SERPL-MCNC: 91 MG/DL (ref 74–99)
HCT VFR BLD AUTO: 32.9 % (ref 36–48)
HDLC SERPL-MCNC: 45 MG/DL (ref 40–60)
HDLC SERPL: 2.8 {RATIO} (ref 0–5)
HGB BLD-MCNC: 10.5 G/DL (ref 13–16)
LDLC SERPL CALC-MCNC: 67.2 MG/DL (ref 0–100)
LIPID PROFILE,FLP: NORMAL
LYMPHOCYTES # BLD: 0.8 K/UL (ref 0.9–3.6)
LYMPHOCYTES NFR BLD: 22 % (ref 21–52)
MCH RBC QN AUTO: 33.3 PG (ref 24–34)
MCHC RBC AUTO-ENTMCNC: 31.9 G/DL (ref 31–37)
MCV RBC AUTO: 104.4 FL (ref 74–97)
MONOCYTES # BLD: 0.6 K/UL (ref 0.05–1.2)
MONOCYTES NFR BLD: 16 % (ref 3–10)
NEUTS SEG # BLD: 2 K/UL (ref 1.8–8)
NEUTS SEG NFR BLD: 55 % (ref 40–73)
PLATELET # BLD AUTO: 168 K/UL (ref 135–420)
PMV BLD AUTO: 10.3 FL (ref 9.2–11.8)
POTASSIUM SERPL-SCNC: 3.9 MMOL/L (ref 3.5–5.5)
PROT SERPL-MCNC: 6.7 G/DL (ref 6.4–8.2)
RBC # BLD AUTO: 3.15 M/UL (ref 4.7–5.5)
SODIUM SERPL-SCNC: 144 MMOL/L (ref 136–145)
TRIGL SERPL-MCNC: 64 MG/DL (ref ?–150)
TSH SERPL DL<=0.05 MIU/L-ACNC: 2.03 UIU/ML (ref 0.36–3.74)
VLDLC SERPL CALC-MCNC: 12.8 MG/DL
WBC # BLD AUTO: 3.5 K/UL (ref 4.6–13.2)

## 2019-07-23 PROCEDURE — 80053 COMPREHEN METABOLIC PANEL: CPT

## 2019-07-23 PROCEDURE — 85025 COMPLETE CBC W/AUTO DIFF WBC: CPT

## 2019-07-23 PROCEDURE — 84443 ASSAY THYROID STIM HORMONE: CPT

## 2019-07-23 PROCEDURE — 36415 COLL VENOUS BLD VENIPUNCTURE: CPT

## 2019-07-23 PROCEDURE — 80061 LIPID PANEL: CPT

## 2019-07-23 NOTE — PROGRESS NOTES
A user error has taken place: encounter opened in error, closed for administrative reasons. Patient was supposed to have blood work done prior to visit.

## 2019-07-23 NOTE — PROGRESS NOTES
1. Have you been to the ER, urgent care clinic since your last visit? Hospitalized since your last visit? No    2. Have you seen or consulted any other health care providers outside of the [address] [place] [address] since your last visit? Include any pap smears or colon screening.  No

## 2019-08-05 DIAGNOSIS — Z79.01 LONG TERM (CURRENT) USE OF ANTICOAGULANTS: ICD-10-CM

## 2019-08-05 DIAGNOSIS — Z86.718 HISTORY OF DVT (DEEP VEIN THROMBOSIS): ICD-10-CM

## 2019-08-05 RX ORDER — APIXABAN 2.5 MG/1
TABLET, FILM COATED ORAL
Qty: 60 TAB | Refills: 3 | Status: SHIPPED | OUTPATIENT
Start: 2019-08-05 | End: 2019-12-12 | Stop reason: SDUPTHER

## 2019-08-07 ENCOUNTER — HOSPITAL ENCOUNTER (OUTPATIENT)
Dept: LAB | Age: 84
Discharge: HOME | End: 2019-08-07
Payer: COMMERCIAL

## 2019-08-07 ENCOUNTER — OFFICE VISIT (OUTPATIENT)
Dept: FAMILY MEDICINE CLINIC | Age: 84
End: 2019-08-07

## 2019-08-07 VITALS
RESPIRATION RATE: 12 BRPM | OXYGEN SATURATION: 96 % | WEIGHT: 199 LBS | BODY MASS INDEX: 27.86 KG/M2 | TEMPERATURE: 98.3 F | HEART RATE: 79 BPM | DIASTOLIC BLOOD PRESSURE: 86 MMHG | SYSTOLIC BLOOD PRESSURE: 131 MMHG | HEIGHT: 71 IN

## 2019-08-07 DIAGNOSIS — D53.9 MACROCYTIC ANEMIA: ICD-10-CM

## 2019-08-07 DIAGNOSIS — E03.9 HYPOTHYROIDISM, UNSPECIFIED TYPE: Primary | ICD-10-CM

## 2019-08-07 DIAGNOSIS — E78.5 HYPERLIPIDEMIA, UNSPECIFIED HYPERLIPIDEMIA TYPE: ICD-10-CM

## 2019-08-07 DIAGNOSIS — I10 ESSENTIAL HYPERTENSION: ICD-10-CM

## 2019-08-07 PROCEDURE — 82607 VITAMIN B-12: CPT

## 2019-08-07 PROCEDURE — 36415 COLL VENOUS BLD VENIPUNCTURE: CPT

## 2019-08-07 NOTE — PROGRESS NOTES
1. Have you been to the ER, urgent care clinic since your last visit? Hospitalized since your last visit? No    2. Have you seen or consulted any other health care providers outside of the [address] [place] [address] since your last visit? Include any pap smears or colon screening.  No

## 2019-08-08 LAB
FOLATE SERPL-MCNC: >20 NG/ML (ref 3.1–17.5)
VIT B12 SERPL-MCNC: 1081 PG/ML (ref 211–911)

## 2019-08-30 NOTE — PROGRESS NOTES
Nisha Guzman    CC: Follow-up for chronic disease management    HPI:     Macrocytic Anemia:  -Got requested blood work  -Taking folate supplement        HTN:  -Got requested blood work  -Taking BP medication as prescribed  -Denies any side effects or issues with medication  -Does not exercise or check BP at home        Hypothyroidism:  -Got requested blood work  -Taking synthroid as prescribed  -Denies any side effects or issues with medication        HLD:  -Got requested blood work  -Taking atorvastatin as prescribed  -Denies any side effects or issues with medication       ROS: Positive items marked in RED  CON: fever, chills  Cardiovascular: palpitations, CP  Resp: SOB, cough  GI: nausea, vomiting, diarrhea  : dysuria, hematuria      Past Medical History:   Diagnosis Date    BPH (benign prostatic hyperplasia)     [facility]     Hyperlipemia     Hypertension     IBS (irritable bowel syndrome)     Rec[facility]DVT) ([facility]     Thromboembolus (HCC)     lower extremity    Thyroid disease        History reviewed. No pertinent surgical history. History reviewed. No pertinent family history. Social History     Socioeconomic History    Marital status:      Spouse name: Not on file    Number of children: Not on file    Years of education: Not on file    Highest education level: Not on file   Tobacco Use    Smoking status: Former Smoker    Smokeless tobacco: Never Used   Substance and Sexual Activity    Alcohol use: No    Drug use: No       No Known Allergies      Current Outpatient Medications:     ELIQUIS 2.5 mg tablet, take 1 tablet by mouth twice a day, Disp: 60 Tab, Rfl: 3    finasteride (PROSCAR) 5 mg tablet, Take 1 Tab by mouth daily. , Disp: 90 Tab, Rfl: 1    terazosin (HYTRIN) 1 mg capsule, take 1 capsule by mouth at bedtime, Disp: 90 Cap, Rfl: 1    albuterol-ipratropium (DUO-NEB) 2.5 mg-0.5 mg/3 ml nebu, 3 mL by Nebulization route every six (6) hours as needed for Cough. , Disp: 30 Nebule, Rfl: 12    levothyroxine (SYNTHROID) 50 mcg tablet, Take 1 Tab by mouth Daily (before breakfast). , Disp: 90 Tab, Rfl: 1    amLODIPine (NORVASC) 10 mg tablet, TAKE 1/2 TABLET BY MOUTH DAILY, Disp: 90 Tab, Rfl: 1    atorvastatin (LIPITOR) 40 mg tablet, take 1 tablet by mouth once daily, Disp: 90 Tab, Rfl: 1    omeprazole (PRILOSEC) 20 mg capsule, take 1 capsule once daily, Disp: 90 Cap, Rfl: 1    atenolol (TENORMIN) 25 mg tablet, Take 1 Tab by mouth daily. , Disp: 90 Tab, Rfl: 1    acetaminophen (TYLENOL) 500 mg tablet, Take 2 Tabs by mouth every six (6) hours as needed for Pain., Disp: 90 Tab, Rfl: 1    fluticasone-salmeterol (ADVAIR) 500-50 mcg/dose diskus inhaler, Take 1 Puff by inhalation every twelve (12) hours. , Disp: 60 Each, Rfl: 3    Physical Exam:      /86   Pulse 79   Temp 98.3 °F (36.8 °C) (Oral)   Resp 12   Ht 5' 11\" (1.803 m)   Wt 199 lb (90.3 kg)   SpO2 96%   BMI 27.75 kg/m²      General:  WD, WN, NAD, conversant  Eyes: sclera clear bilaterally, no discharge noted, eyelids normal in appearance  HENT: NCAT, nasal turbinates, oropharynx clear, MMM  Neck: supple, no lymphadenopathy  Lungs: CTAB, normal respiratory effort and rate  CV: RRR, no MRGs  ABD: soft, non-tender, non-distended, normal bowel sounds  Ext: 2+ pitting edema in LE bilaterally  Skin: normal temperature, turgor, color, and texture  Psych: alert and oriented to person, place and situation, normal affect  Neuro: speech normal, moving all extremities, gait normal    Results for Miri Nielsen (MRN [id number]):   Ref.  Range 7/23/2019 11:13   WBC Latest Ref Range: 4.6 - 13.2 K/uL 3.5 (L)   RBC Latest Ref Range: 4.70 - 5.50 M/uL 3.15 (L)   HGB Latest Ref Range: 13.0 - 16.0 g/dL 10.5 (L)   HCT Latest Ref Range: 36.0 - 48.0 % 32.9 (L)   MCV Latest Ref Range: 74.0 - 97.0 .4 (H)   MCH Latest Ref Range: 24.0 - 34.0 PG 33.3   MCHC Latest Ref Range: 31.0 - 37.0 g/dL 31.9   RDW Latest Ref Range: 11.6 - 14.5 % 14.3   PLATELET Latest Ref Range: 135 - 420 K/uL 168   MPV Latest Ref Range: 9.2 - 11.8 FL 10.3   NEUTROPHILS Latest Ref Range: 40 - 73 % 55   LYMPHOCYTES Latest Ref Range: 21 - 52 % 22   MONOCYTES Latest Ref Range: 3 - 10 % 16 (H)   EOSINOPHILS Latest Ref Range: 0 - 5 % 7 (H)   BASOPHILS Latest Ref Range: 0 - 2 % 0   DF Latest Units:   AUTOMATED   ABS. NEUTROPHILS Latest Ref Range: 1.8 - 8.0 K/UL 2.0   ABS. LYMPHOCYTES Latest Ref Range: 0.9 - 3.6 K/UL 0.8 (L)   ABS. MONOCYTES Latest Ref Range: 0.05 - 1.2 K/UL 0.6   ABS. EOSINOPHILS Latest Ref Range: 0.0 - 0.4 K/UL 0.2   ABS. BASOPHILS Latest Ref Range: 0.0 - 0.1 K/UL 0.0   Sodium Latest Ref Range: 136 - 145 mmol/L 144   Potassium Latest Ref Range: 3.5 - 5.5 mmol/L 3.9   Chloride Latest Ref Range: 100 - 111 mmol/L 113 (H)   CO2 Latest Ref Range: 21 - 32 mmol/L 25   Anion gap Latest Ref Range: 3.0 - 18 mmol/L 6   Glucose Latest Ref Range: 74 - 99 mg/dL 91   BUN Latest Ref Range: 7.0 - 18 MG/DL 9   Creatinine Latest Ref Range: 0.6 - 1.3 MG/DL 1.15   BUN/Creatinine ratio Latest Ref Range: 12 - 20   8 (L)   Calcium Latest Ref Range: 8.5 - 10.1 MG/DL 8.5   GFR est non-AA Latest Ref Range: >60 ml/min/1.73m2 >60   GFR est AA Latest Ref Range: >60 ml/min/1.73m2 >60   Bilirubin, total Latest Ref Range: 0.2 - 1.0 MG/DL 0.3   Protein, total Latest Ref Range: 6.4 - 8.2 g/dL 6.7   Albumin Latest Ref Range: 3.4 - 5.0 g/dL 3.1 (L)   Globulin Latest Ref Range: 2.0 - 4.0 g/dL 3.6   A-G Ratio Latest Ref Range: 0.8 - 1.7   0.9   ALT (SGPT) Latest Ref Range: 16 - 61 U/L 60   AST Latest Ref Range: 10 - 38 U/L 36   Alk.  phosphatase Latest Ref Range: 45 - 117 U/L 74   Triglyceride Latest Ref Range: <150 MG/DL 64   Cholesterol, total Latest Ref Range: <200 MG/   HDL Cholesterol Latest Ref Range: 40 - 60 MG/DL 45   CHOL/HDL Ratio Latest Ref Range: 0 - 5.0   2.8   LDL, calculated Latest Ref Range: 0 - 100 MG/DL 67.2   VLDL, calculated Latest Units: MG/DL 12.8   TSH Latest Ref Range: 0.36 - 3.74 uIU/mL 2.03       Assessment/Plan     HTN:  -Will continue current BP medication regimen  -Follow-up in 3 months        Hypothyroidism, well controlled:  -Will continue current synthroid regimen  -Follow-up in 3 months        HLD, well controlled:  -Will continue current statin regimen  -Follow-up in 3 months      Macrocytic anemia:  -Incorrectly diagnosis folate deficiency due to error reading lab  -Folate supplement stopped  -Folate and B12 level ordered  -Follow-up in 3 months      Les Zamora MD  8/7/2019

## 2019-09-05 DIAGNOSIS — K21.9 GASTROESOPHAGEAL REFLUX DISEASE, ESOPHAGITIS PRESENCE NOT SPECIFIED: ICD-10-CM

## 2019-09-18 RX ORDER — OMEPRAZOLE 20 MG/1
CAPSULE, DELAYED RELEASE ORAL
Qty: 90 CAP | Refills: 1 | Status: SHIPPED | OUTPATIENT
Start: 2019-09-18 | End: 2020-03-03

## 2019-10-04 DIAGNOSIS — E78.5 HYPERLIPIDEMIA, UNSPECIFIED HYPERLIPIDEMIA TYPE: ICD-10-CM

## 2019-10-04 RX ORDER — ATORVASTATIN CALCIUM 40 MG/1
TABLET, FILM COATED ORAL
Qty: 90 TAB | Refills: 1 | Status: SHIPPED | OUTPATIENT
Start: 2019-10-04 | End: 2020-03-27

## 2019-10-23 DIAGNOSIS — E03.9 HYPOTHYROIDISM, UNSPECIFIED TYPE: ICD-10-CM

## 2019-10-25 RX ORDER — LEVOTHYROXINE SODIUM 50 UG/1
TABLET ORAL
Qty: 90 TAB | Refills: 1 | Status: SHIPPED | OUTPATIENT
Start: 2019-10-25 | End: 2020-04-27

## 2019-12-18 ENCOUNTER — OFFICE VISIT (OUTPATIENT)
Dept: FAMILY MEDICINE CLINIC | Age: 84
End: 2019-12-18

## 2019-12-18 ENCOUNTER — HOSPITAL ENCOUNTER (OUTPATIENT)
Dept: LAB | Age: 84
Discharge: HOME | End: 2019-12-18
Payer: COMMERCIAL

## 2019-12-18 VITALS
HEIGHT: 71 IN | HEART RATE: 70 BPM | TEMPERATURE: 98.3 F | OXYGEN SATURATION: 90 % | RESPIRATION RATE: 16 BRPM | SYSTOLIC BLOOD PRESSURE: 125 MMHG | WEIGHT: 199 LBS | BODY MASS INDEX: 27.86 KG/M2 | DIASTOLIC BLOOD PRESSURE: 69 MMHG

## 2019-12-18 DIAGNOSIS — D53.9 MACROCYTIC ANEMIA: ICD-10-CM

## 2019-12-18 DIAGNOSIS — D53.9 MACROCYTIC ANEMIA: Primary | ICD-10-CM

## 2019-12-18 DIAGNOSIS — R35.0 URINARY FREQUENCY: ICD-10-CM

## 2019-12-18 DIAGNOSIS — Z23 ENCOUNTER FOR IMMUNIZATION: ICD-10-CM

## 2019-12-18 DIAGNOSIS — I10 ESSENTIAL HYPERTENSION: ICD-10-CM

## 2019-12-18 DIAGNOSIS — M62.838 MUSCLE SPASM: ICD-10-CM

## 2019-12-18 LAB
APPEARANCE UR: CLEAR
BACTERIA URNS QL MICRO: NEGATIVE /HPF
BILIRUB UR QL: NEGATIVE
COLOR UR: YELLOW
EPITH CASTS URNS QL MICRO: NORMAL /LPF (ref 0–5)
ERYTHROCYTE [DISTWIDTH] IN BLOOD BY AUTOMATED COUNT: 13.4 % (ref 11.6–14.5)
GLUCOSE UR STRIP.AUTO-MCNC: NEGATIVE MG/DL
HCT VFR BLD AUTO: 36.7 % (ref 36–48)
HGB BLD-MCNC: 12 G/DL (ref 13–16)
HGB UR QL STRIP: NEGATIVE
HYALINE CASTS URNS QL MICRO: NORMAL /LPF (ref 0–2)
KETONES UR QL STRIP.AUTO: NEGATIVE MG/DL
LEUKOCYTE ESTERASE UR QL STRIP.AUTO: NEGATIVE
MCH RBC QN AUTO: 34.3 PG (ref 24–34)
MCHC RBC AUTO-ENTMCNC: 32.7 G/DL (ref 31–37)
MCV RBC AUTO: 104.9 FL (ref 74–97)
NITRITE UR QL STRIP.AUTO: NEGATIVE
PH UR STRIP: 7 [PH] (ref 5–8)
PLATELET # BLD AUTO: 155 K/UL (ref 135–420)
PMV BLD AUTO: 11 FL (ref 9.2–11.8)
PROT UR STRIP-MCNC: NEGATIVE MG/DL
RBC # BLD AUTO: 3.5 M/UL (ref 4.7–5.5)
RBC #/AREA URNS HPF: NEGATIVE /HPF (ref 0–5)
SP GR UR REFRACTOMETRY: 1.02 (ref 1–1.03)
UROBILINOGEN UR QL STRIP.AUTO: 0.2 EU/DL (ref 0.2–1)
WBC # BLD AUTO: 4 K/UL (ref 4.6–13.2)
WBC URNS QL MICRO: NORMAL /HPF (ref 0–4)

## 2019-12-18 PROCEDURE — 87086 URINE CULTURE/COLONY COUNT: CPT

## 2019-12-18 PROCEDURE — 81001 URINALYSIS AUTO W/SCOPE: CPT

## 2019-12-18 PROCEDURE — 85027 COMPLETE CBC AUTOMATED: CPT

## 2019-12-18 PROCEDURE — 36415 COLL VENOUS BLD VENIPUNCTURE: CPT

## 2019-12-18 RX ORDER — TIZANIDINE 2 MG/1
2 TABLET ORAL
Qty: 90 TAB | Refills: 2 | Status: SHIPPED | OUTPATIENT
Start: 2019-12-18 | End: 2020-05-18

## 2019-12-18 NOTE — PROGRESS NOTES
1. Have you been to the ER, urgent care clinic since your last visit? Hospitalized since your last visit? Yes When: 10-4-19 [facility] for neck pain and bilateral lower extremity swelling    2. Have you seen or consulted any other health care providers outside of the [address] [place] [address] since your last visit? Include any pap smears or colon screening. No      After obtaining consent, and per orders of Dr. Kate Gordon, injection of Influenza Fluad given by Raina Wong LPN. Patient instructed to remain in clinic for 20 minutes afterwards, and to report any adverse reaction to me immediately.

## 2019-12-20 LAB
BACTERIA SPEC CULT: NORMAL
SERVICE CMNT-IMP: NORMAL

## 2019-12-27 DIAGNOSIS — N40.1 BENIGN PROSTATIC HYPERPLASIA WITH URINARY FREQUENCY: ICD-10-CM

## 2019-12-27 DIAGNOSIS — I10 ESSENTIAL HYPERTENSION: ICD-10-CM

## 2019-12-27 DIAGNOSIS — R35.0 BENIGN PROSTATIC HYPERPLASIA WITH URINARY FREQUENCY: ICD-10-CM

## 2019-12-27 RX ORDER — ATENOLOL 25 MG/1
TABLET ORAL
Qty: 90 TAB | Refills: 1 | Status: SHIPPED | OUTPATIENT
Start: 2019-12-27 | End: 2020-04-03

## 2019-12-27 RX ORDER — TERAZOSIN 1 MG/1
CAPSULE ORAL
Qty: 90 CAP | Refills: 1 | Status: SHIPPED | OUTPATIENT
Start: 2019-12-27 | End: 2020-01-27 | Stop reason: DRUGHIGH

## 2019-12-31 NOTE — PROGRESS NOTES
[facility]    CC: Follow-up for hypertension and macrocytic anemia    HPI:     Hypertension:  -Taking BP medication as prescribed  -Denies any side effects or issues with BP medication  -Does not check blood pressure at home  -Not following a regular exercise regimen  -Diet is unchanged since last visit      Macrocytic Anemia:  -Got requested lab work  -Patient reports he has forgotten about this issue  -Seems to be somewhat confused about what is being worked up exactly  -Denies any associated symptoms      Urinary Frequency:  -Reports this has been worsening recently  -Has been taking his prescribed Proscar  -Denies any other urinary issues      ROS: Positive items marked in RED  CON: fever, chills  Cardiovascular: palpitations, CP  Resp: SOB, cough  GI: nausea, vomiting, diarrhea  : dysuria, hematuria      Past Medical History:   Diagnosis Date    BPH (benign prostatic hyperplasia)     [facility]     Hyperlipemia     Hypertension     IBS (irritable bowel syndrome)     Recurrent deep vein thrombosis (DVT) ([facility]     [facility]     lower extremity    Thyroid disease        History reviewed. No pertinent surgical history. History reviewed. No pertinent family history. Social History     Socioeconomic History    Marital status:      Spouse name: Not on file    Number of children: Not on file    Years of education: Not on file    Highest education level: Not on file   Tobacco Use    Smoking status: Former Smoker    Smokeless tobacco: Never Used   Substance and Sexual Activity    Alcohol use: No    Drug use: No       No Known Allergies      Current Outpatient Medications:     tiZANidine (ZANAFLEX) 2 mg tablet, Take 1 Tab by mouth three (3) times daily as needed (Spasm). , Disp: 90 Tab, Rfl: 2    ELIQUIS 2.5 mg tablet, take 1 tablet by mouth twice a day, Disp: 180 Tab, Rfl: 1    amLODIPine (NORVASC) 10 mg tablet, TAKE 1/2 TABLET BY MOUTH DAILY, Disp: 90 Tab, Rfl: 3    levothyroxine (SYNTHROID) 50 mcg tablet, take 1 tablet once daily before breakfast, Disp: 90 Tab, Rfl: 1    atorvastatin (LIPITOR) 40 mg tablet, take 1 tablet by mouth once daily, Disp: 90 Tab, Rfl: 1    omeprazole (PRILOSEC) 20 mg capsule, take 1 capsule by mouth once daily, Disp: 90 Cap, Rfl: 1    finasteride (PROSCAR) 5 mg tablet, Take 1 Tab by mouth daily. , Disp: 90 Tab, Rfl: 1    albuterol-ipratropium (DUO-NEB) 2.5 mg-0.5 mg/3 ml nebu, 3 mL by Nebulization route every six (6) hours as needed for Cough. , Disp: 30 Nebule, Rfl: 12    acetaminophen (TYLENOL) 500 mg tablet, Take 2 Tabs by mouth every six (6) hours as needed for Pain., Disp: 90 Tab, Rfl: 1    fluticasone-salmeterol (ADVAIR) 500-50 mcg/dose diskus inhaler, Take 1 Puff by inhalation every twelve (12) hours. , Disp: 60 Each, Rfl: 3    atenolol (TENORMIN) 25 mg tablet, take 1 tablet by mouth once daily, Disp: 90 Tab, Rfl: 1    terazosin (HYTRIN) 1 mg capsule, take 1 capsule by mouth at bedtime, Disp: 90 Cap, Rfl: 1    Physical Exam:      /69   Pulse 70   Temp 98.3 °F (36.8 °C) (Oral)   Resp 16   Ht 5' 11\" (1.803 m)   Wt 199 lb (90.3 kg)   SpO2 90%   BMI 27.75 kg/m²     General:  WD, WN, NAD, conversant  Eyes: sclera clear bilaterally, no discharge noted, eyelids normal in appearance  HENT: NCAT  Lungs: CTAB, normal respiratory effort and rate  CV: RRR, no MRGs  ABD: soft, non-tender, non-distended, normal bowel sounds  Skin: normal temperature, turgor, color, and texture  Psych: alert and oriented to person, place and situation, normal affect  Neuro: speech normal, moving all extremities, ambulating with walker    Results for Llii Diamond (MRN [id number]):   Ref.  Range 8/7/2019 13:33   Vitamin B12 Latest Ref Range: 211 - 911 pg/mL 1,081 (H)   Folate Latest Ref Range: 3.10 - 17.50 ng/mL >20.0 (H)       Assessment/Plan     Hypertension, well controlled:  -Will continue current blood pressure medication regimen  -Follow-up in 1 month      Macrocytic Anemia:  -Patient had forgotten that this was being worked up  -Had to reeducate patient on issue and reasoning for evaluation  -CBC ordered  -Reticulocyte count ordered for next visit (Forgot to put in order during visit due to the length of time that was spent reeducating patient on diagnosis/issue)  -Follow-up in a month      Urinary Frequency:  -Etiology unclear.  Suspect it maybe due to his BPH, although differential diagnosis includes UTI  -U/A and urine culture ordered  -Plan to treat as indicated by test results  -PSA ordered for next visit (Forgot to put in order during visit due to the length of time that was spent reeducating patient on his macrocytic anemia)  -Follow-up in 1 month      Health Maintenance:  -Influenza vaccine administered today        Guanakito Flores MD  12/18/2019

## 2020-01-02 DIAGNOSIS — R35.0 BENIGN PROSTATIC HYPERPLASIA WITH URINARY FREQUENCY: ICD-10-CM

## 2020-01-02 DIAGNOSIS — N40.1 BENIGN PROSTATIC HYPERPLASIA WITH URINARY FREQUENCY: ICD-10-CM

## 2020-01-03 RX ORDER — FINASTERIDE 5 MG/1
5 TABLET, FILM COATED ORAL DAILY
Qty: 90 TAB | Refills: 1 | Status: SHIPPED | OUTPATIENT
Start: 2020-01-03 | End: 2020-11-16

## 2020-01-27 ENCOUNTER — OFFICE VISIT (OUTPATIENT)
Dept: FAMILY MEDICINE CLINIC | Age: 85
End: 2020-01-27

## 2020-01-27 ENCOUNTER — HOSPITAL ENCOUNTER (OUTPATIENT)
Dept: LAB | Age: 85
Discharge: HOME | End: 2020-01-27
Payer: COMMERCIAL

## 2020-01-27 VITALS
WEIGHT: 217 LBS | HEART RATE: 86 BPM | BODY MASS INDEX: 30.38 KG/M2 | HEIGHT: 71 IN | OXYGEN SATURATION: 95 % | RESPIRATION RATE: 16 BRPM | TEMPERATURE: 98.9 F | DIASTOLIC BLOOD PRESSURE: 70 MMHG | SYSTOLIC BLOOD PRESSURE: 116 MMHG

## 2020-01-27 DIAGNOSIS — I10 ESSENTIAL HYPERTENSION: ICD-10-CM

## 2020-01-27 DIAGNOSIS — R35.0 URINARY FREQUENCY: ICD-10-CM

## 2020-01-27 DIAGNOSIS — R35.0 URINARY FREQUENCY: Primary | ICD-10-CM

## 2020-01-27 DIAGNOSIS — D53.9 MACROCYTIC ANEMIA: ICD-10-CM

## 2020-01-27 DIAGNOSIS — R35.0 BENIGN PROSTATIC HYPERPLASIA WITH URINARY FREQUENCY: ICD-10-CM

## 2020-01-27 DIAGNOSIS — N40.1 BENIGN PROSTATIC HYPERPLASIA WITH URINARY FREQUENCY: ICD-10-CM

## 2020-01-27 LAB — RETICS/RBC NFR AUTO: 1.1 % (ref 0.5–2.3)

## 2020-01-27 PROCEDURE — 84153 ASSAY OF PSA TOTAL: CPT

## 2020-01-27 PROCEDURE — 36415 COLL VENOUS BLD VENIPUNCTURE: CPT

## 2020-01-27 PROCEDURE — 85045 AUTOMATED RETICULOCYTE COUNT: CPT

## 2020-01-27 RX ORDER — TERAZOSIN 2 MG/1
2 CAPSULE ORAL
Qty: 30 CAP | Refills: 5 | Status: SHIPPED | OUTPATIENT
Start: 2020-01-27 | End: 2020-04-03 | Stop reason: SDUPTHER

## 2020-01-27 RX ORDER — SODIUM CHLORIDE FOR INHALATION 0.9 %
2.5 VIAL, NEBULIZER (ML) INHALATION AS NEEDED
COMMUNITY
End: 2020-11-16

## 2020-01-27 RX ORDER — FUROSEMIDE 20 MG/1
20 TABLET ORAL DAILY
COMMUNITY
End: 2020-03-06

## 2020-01-27 NOTE — PROGRESS NOTES
No chief complaint on file. Pt preferred language for health care discussion is english. Is someone accompanying this pt? no    Is the patient using any DME equipment during OV? no    Depression Screening:  3 most recent [facility] Screens 1/27/2020 5/16/2019 10/25/2018 7/5/2018 3/28/2018   Little interest or pleasure in doing things Not at all Several days Several days Several days Not at all   Feeling down, depressed, irritable, or hopeless Several days Not at all Not at all Several days Not at all   Total Score PHQ 2 1 1 1 2 0       Learning Assessment:  Learning Assessment 3/28/2018   PRIMARY LEARNER Patient   HIGHEST LEVEL OF EDUCATION - PRIMARY LEARNER  DID NOT GRADUATE [facility] PRIMARY LEARNER NONE   CO-LEARNER CAREGIVER No   PRIMARY LANGUAGE ENGLISH   LEARNER PREFERENCE PRIMARY READING   ANSWERED BY Patient   RELATIONSHIP SELF       Abuse Screening:  Abuse Screening Questionnaire 3/28/2018   Do you ever feel afraid of your partner? N   Are you in a relationship with someone who physically or mentally threatens you? N   Is it safe for you to go home? Y       Fall Risk  Fall Risk Assessment, last 12 mths 5/16/2019   Able to walk? Yes   Fall in past 12 months? No           Advance Directive:  1. Do you have an advance directive in place? Patient Reply:no    2. If not, would you like material regarding how to put one in place? Patient Reply: no    2. Per patient no changes to their ACP contact no. Coordination of Care:  1. Have you been to the ER, urgent care clinic since your last visit? Hospitalized since your last visit? no    2. Have you seen or consulted any other health care providers outside of the [address] [place] [address] since your last visit? Include any pap smears or colon screening.  no    Provider prefers to do his own med reconciliation

## 2020-01-27 NOTE — PROGRESS NOTES
[facility]    CC: F/U for Chronic Disease Management    HPI:     Hypertension:  -Taking BP medication as prescribed  -Denies any side effects or issues with his BP medication  -Does not check blood pressure at home  -Not following any regular exercise regimen  -Diet is unchanged since last visit        Macrocytic Anemia:  -Got requested lab work  -Reports that he does not drink any alcohol  -Diet is unchanged since last visit  -Denies any associated symptoms        Urinary Frequency:  -Got requested lab work  -Has been taking Proscar prescribed  -Denies any side effects or issues with the medication  -Reports issue is unchanged from prior visit      ROS: Positive items marked in RED  CON: fever, chills  Cardiovascular: palpitations, CP  Resp: SOB, cough  GI: nausea, vomiting, diarrhea  : dysuria, hematuria      Past Medical History:   Diagnosis Date    BPH (benign prostatic hyperplasia)     Chronic obstructive pulmonary disease (HCC)     Hyperlipemia     Hypertension     IBS (irritable bowel syndrome)     [facility]) [facility]     Thromboembolus ([facility]     lower extremity    Thyroid disease        History reviewed. No pertinent surgical history. History reviewed. No pertinent family history. Social History     Socioeconomic History    Marital status:      Spouse name: Not on file    Number of children: Not on file    Years of education: Not on file    Highest education level: Not on file   Tobacco Use    Smoking status: Former Smoker    Smokeless tobacco: Never Used   Substance and Sexual Activity    Alcohol use: No    Drug use: No       No Known Allergies      Current Outpatient Medications:     furosemide (LASIX) 20 mg tablet, Take 20 mg by mouth daily. , Disp: , Rfl:     sodium chloride 0.9 % nebu, 2.5 mL by Nebulization route as needed. , Disp: , Rfl:     finasteride (PROSCAR) 5 mg tablet, Take 1 Tab by mouth daily. , Disp: 90 Tab, Rfl: 1   atenolol (TENORMIN) 25 mg tablet, take 1 tablet by mouth once daily, Disp: 90 Tab, Rfl: 1    tiZANidine (ZANAFLEX) 2 mg tablet, Take 1 Tab by mouth three (3) times daily as needed (Spasm). , Disp: 90 Tab, Rfl: 2    ELIQUIS 2.5 mg tablet, take 1 tablet by mouth twice a day, Disp: 180 Tab, Rfl: 1    amLODIPine (NORVASC) 10 mg tablet, TAKE 1/2 TABLET BY MOUTH DAILY, Disp: 90 Tab, Rfl: 3    levothyroxine (SYNTHROID) 50 mcg tablet, take 1 tablet once daily before breakfast, Disp: 90 Tab, Rfl: 1    atorvastatin (LIPITOR) 40 mg tablet, take 1 tablet by mouth once daily, Disp: 90 Tab, Rfl: 1    omeprazole (PRILOSEC) 20 mg capsule, take 1 capsule by mouth once daily, Disp: 90 Cap, Rfl: 1    albuterol-ipratropium (DUO-NEB) 2.5 mg-0.5 mg/3 ml nebu, 3 mL by Nebulization route every six (6) hours as needed for Cough. , Disp: 30 Nebule, Rfl: 12    acetaminophen (TYLENOL) 500 mg tablet, Take 2 Tabs by mouth every six (6) hours as needed for Pain., Disp: 90 Tab, Rfl: 1    fluticasone-salmeterol (ADVAIR) 500-50 mcg/dose diskus inhaler, Take 1 Puff by inhalation every twelve (12) hours. , Disp: 60 Each, Rfl: 3    Physical Exam:      /70 (BP 1 Location: Left arm, BP Patient Position: Sitting)   Pulse 86   Temp 98.9 °F (37.2 °C)   Resp 16   Ht 5' 11\" (1.803 m)   Wt 217 lb (98.4 kg)   SpO2 95%   BMI 30.27 kg/m²     General: obese habitus, NAD, conversant  Eyes: sclera clear bilaterally, no discharge noted, eyelids normal in appearance  HENT: NCAT  Lungs: CTAB, normal respiratory effort and rate  CV: RRR, no MRGs  ABD: soft, non-tender, non-distended, normal bowel sounds  Skin: normal temperature, turgor, color, and texture  Psych: alert and oriented to person, place and situation, normal affect  Neuro: speech normal, moving all extremities, ambulating with walker    Results for Maria Ines Castro (MRN [id number]):   Ref.  Range 12/18/2019 15:35   WBC Latest Ref Range: 4.6 - 13.2 K/uL 4.0 (L)   RBC Latest Ref Range: 4.70 - 5.50 M/uL 3.50 (L)   HGB Latest Ref Range: 13.0 - 16.0 g/dL 12.0 (L)   HCT Latest Ref Range: 36.0 - 48.0 % 36.7   MCV Latest Ref Range: 74.0 - 97.0 .9 (H)   MCH Latest Ref Range: 24.0 - 34.0 PG 34.3 (H)   MCHC Latest Ref Range: 31.0 - 37.0 g/dL 32.7   RDW Latest Ref Range: 11.6 - 14.5 % 13.4   PLATELET Latest Ref Range: 135 - 420 K/uL 155   MPV Latest Ref Range: 9.2 - 11.8 FL 11.0   Color Latest Units:   YELLOW   Appearance Latest Units:   CLEAR   Specific gravity Latest Ref Range: 1.005 - 1.030   1.020   pH (UA) Latest Ref Range: 5.0 - 8.0   7.0   Protein Latest Ref Range: NEG mg/dL NEGATIVE   Glucose Latest Ref Range: NEG mg/dL NEGATIVE   Ketone Latest Ref Range: NEG mg/dL NEGATIVE   Blood Latest Ref Range: NEG   NEGATIVE   Bilirubin Latest Ref Range: NEG   NEGATIVE   Urobilinogen Latest Ref Range: 0.2 - 1.0 EU/dL 0.2   Nitrites Latest Ref Range: NEG   NEGATIVE   Leukocyte Esterase Latest Ref Range: NEG   NEGATIVE   Epithelial cells Latest Ref Range: 0 - 5 /lpf FEW   WBC Latest Ref Range: 0 - 4 /hpf 0 to 2   RBC Latest Ref Range: 0 - 5 /hpf NEGATIVE   Bacteria Latest Ref Range: NEG /hpf NEGATIVE   Hyaline cast Latest Ref Range: 0 - 2 /lpf 0 to 2       Urine Culture (12/18/2019):  Specimen Information: Urine        Component Value Ref Range & Units Status   Special Requests: NO SPECIAL REQUESTS    Final   Culture result: NO GROWTH 2 DAYS    Final         Assessment/Plan     Hypertension, well controlled:  -Will continue current blood pressure medication regimen  -Follow-up in 1 month        Macrocytic Anemia, Improving:  -Etiology unclear  -Reticulocyte count ordered  -Follow-up in 1 month        Urinary Frequency, Unchanged:  -Likely 2/2 BPH  -PSA ordered  -Started on trial of terazosin  -Follow-up in 1 month        Pamela Bullock MD  1/27/2020, 1:06 PM

## 2020-01-29 LAB
PSA SERPL-MCNC: <0.1 NG/ML (ref 0–4)
REFLEX CRITERIA: NORMAL

## 2020-02-27 DIAGNOSIS — K21.9 GASTROESOPHAGEAL REFLUX DISEASE, ESOPHAGITIS PRESENCE NOT SPECIFIED: ICD-10-CM

## 2020-03-03 RX ORDER — OMEPRAZOLE 20 MG/1
CAPSULE, DELAYED RELEASE ORAL
Qty: 90 CAP | Refills: 1 | Status: SHIPPED | OUTPATIENT
Start: 2020-03-03 | End: 2020-11-16

## 2020-03-27 DIAGNOSIS — E78.5 HYPERLIPIDEMIA, UNSPECIFIED HYPERLIPIDEMIA TYPE: ICD-10-CM

## 2020-03-27 RX ORDER — ATORVASTATIN CALCIUM 40 MG/1
TABLET, FILM COATED ORAL
Qty: 90 TAB | Refills: 1 | Status: SHIPPED | OUTPATIENT
Start: 2020-03-27 | End: 2020-10-01

## 2020-04-02 DIAGNOSIS — R35.0 BENIGN PROSTATIC HYPERPLASIA WITH URINARY FREQUENCY: ICD-10-CM

## 2020-04-02 DIAGNOSIS — N40.1 BENIGN PROSTATIC HYPERPLASIA WITH URINARY FREQUENCY: ICD-10-CM

## 2020-04-02 DIAGNOSIS — I10 ESSENTIAL HYPERTENSION: ICD-10-CM

## 2020-04-03 RX ORDER — TERAZOSIN 2 MG/1
2 CAPSULE ORAL
Qty: 30 CAP | Refills: 5 | Status: SHIPPED | OUTPATIENT
Start: 2020-04-03 | End: 2020-10-29

## 2020-04-03 RX ORDER — TERAZOSIN 1 MG/1
CAPSULE ORAL
Qty: 90 CAP | Refills: 1 | OUTPATIENT
Start: 2020-04-03

## 2020-04-03 RX ORDER — ATENOLOL 25 MG/1
TABLET ORAL
Qty: 90 TAB | Refills: 1 | Status: SHIPPED | OUTPATIENT
Start: 2020-04-03 | End: 2020-10-29

## 2020-04-24 DIAGNOSIS — E03.9 HYPOTHYROIDISM, UNSPECIFIED TYPE: ICD-10-CM

## 2020-04-27 RX ORDER — LEVOTHYROXINE SODIUM 50 UG/1
TABLET ORAL
Qty: 90 TAB | Refills: 1 | Status: SHIPPED | OUTPATIENT
Start: 2020-04-27 | End: 2020-11-12

## 2020-05-09 DIAGNOSIS — J44.9 CHRONIC OBSTRUCTIVE PULMONARY DISEASE, UNSPECIFIED COPD TYPE (HCC): ICD-10-CM

## 2020-05-11 RX ORDER — IPRATROPIUM BROMIDE AND ALBUTEROL SULFATE 2.5; .5 MG/3ML; MG/3ML
SOLUTION RESPIRATORY (INHALATION)
Qty: 75 ML | Refills: 0 | Status: SHIPPED | OUTPATIENT
Start: 2020-05-11 | End: 2020-05-27

## 2020-05-18 DIAGNOSIS — M62.838 MUSCLE SPASM: ICD-10-CM

## 2020-05-18 RX ORDER — TIZANIDINE 2 MG/1
TABLET ORAL
Qty: 90 TAB | Refills: 2 | Status: SHIPPED | OUTPATIENT
Start: 2020-05-18 | End: 2020-10-29

## 2020-05-22 DIAGNOSIS — J44.9 CHRONIC OBSTRUCTIVE PULMONARY DISEASE, UNSPECIFIED COPD TYPE (HCC): ICD-10-CM

## 2020-05-27 RX ORDER — IPRATROPIUM BROMIDE AND ALBUTEROL SULFATE 2.5; .5 MG/3ML; MG/3ML
SOLUTION RESPIRATORY (INHALATION)
Qty: 75 ML | Refills: 0 | Status: SHIPPED | OUTPATIENT
Start: 2020-05-27 | End: 2020-11-23 | Stop reason: SDUPTHER

## 2020-09-25 DIAGNOSIS — E78.5 HYPERLIPIDEMIA, UNSPECIFIED HYPERLIPIDEMIA TYPE: ICD-10-CM

## 2020-10-01 RX ORDER — ATORVASTATIN CALCIUM 40 MG/1
TABLET, FILM COATED ORAL
Qty: 90 TAB | Refills: 1 | Status: SHIPPED | OUTPATIENT
Start: 2020-10-01 | End: 2020-10-29

## 2020-10-09 ENCOUNTER — HOSPITAL ENCOUNTER (OUTPATIENT)
Dept: LAB | Age: 85
Discharge: HOME | End: 2020-10-09

## 2020-10-09 LAB
ANION GAP SERPL CALC-SCNC: 8 MMOL/L (ref 3–18)
BUN SERPL-MCNC: 30 MG/DL (ref 7–18)
BUN/CREAT SERPL: 16 (ref 12–20)
CALCIUM SERPL-MCNC: 8.5 MG/DL (ref 8.5–10.1)
CHLORIDE SERPL-SCNC: 107 MMOL/L (ref 100–111)
CO2 SERPL-SCNC: 26 MMOL/L (ref 21–32)
CREAT SERPL-MCNC: 1.93 MG/DL (ref 0.6–1.3)
GLUCOSE SERPL-MCNC: 79 MG/DL (ref 74–99)
POTASSIUM SERPL-SCNC: 2.8 MMOL/L (ref 3.5–5.5)
SODIUM SERPL-SCNC: 141 MMOL/L (ref 136–145)

## 2020-10-09 PROCEDURE — 80048 BASIC METABOLIC PNL TOTAL CA: CPT

## 2020-10-29 ENCOUNTER — OFFICE VISIT (OUTPATIENT)
Dept: FAMILY MEDICINE CLINIC | Age: 85
End: 2020-10-29
Payer: COMMERCIAL

## 2020-10-29 ENCOUNTER — HOSPITAL ENCOUNTER (OUTPATIENT)
Dept: LAB | Age: 85
Discharge: HOME | End: 2020-10-29
Payer: COMMERCIAL

## 2020-10-29 VITALS
HEIGHT: 72 IN | WEIGHT: 178 LBS | BODY MASS INDEX: 24.11 KG/M2 | TEMPERATURE: 97.3 F | OXYGEN SATURATION: 95 % | SYSTOLIC BLOOD PRESSURE: 132 MMHG | HEART RATE: 76 BPM | RESPIRATION RATE: 16 BRPM | DIASTOLIC BLOOD PRESSURE: 81 MMHG

## 2020-10-29 DIAGNOSIS — R60.0 BILATERAL LOWER EXTREMITY EDEMA: Primary | ICD-10-CM

## 2020-10-29 DIAGNOSIS — N40.1 BENIGN PROSTATIC HYPERPLASIA WITH URINARY FREQUENCY: ICD-10-CM

## 2020-10-29 DIAGNOSIS — E03.9 HYPOTHYROIDISM, UNSPECIFIED TYPE: ICD-10-CM

## 2020-10-29 DIAGNOSIS — I48.91 ATRIAL FIBRILLATION WITH RVR (HCC): ICD-10-CM

## 2020-10-29 DIAGNOSIS — J44.9 CHRONIC OBSTRUCTIVE PULMONARY DISEASE, UNSPECIFIED COPD TYPE (HCC): ICD-10-CM

## 2020-10-29 DIAGNOSIS — I10 ESSENTIAL HYPERTENSION: ICD-10-CM

## 2020-10-29 DIAGNOSIS — N17.9 AKI (ACUTE KIDNEY INJURY) (HCC): ICD-10-CM

## 2020-10-29 DIAGNOSIS — R60.0 BILATERAL LOWER EXTREMITY EDEMA: ICD-10-CM

## 2020-10-29 DIAGNOSIS — R35.0 BENIGN PROSTATIC HYPERPLASIA WITH URINARY FREQUENCY: ICD-10-CM

## 2020-10-29 LAB
ALBUMIN SERPL-MCNC: 3.3 G/DL (ref 3.4–5)
ALBUMIN/GLOB SERPL: 0.8 {RATIO} (ref 0.8–1.7)
ALP SERPL-CCNC: 58 U/L (ref 45–117)
ALT SERPL-CCNC: 10 U/L (ref 16–61)
ANION GAP SERPL CALC-SCNC: 9 MMOL/L (ref 3–18)
AST SERPL-CCNC: 16 U/L (ref 10–38)
BILIRUB SERPL-MCNC: 0.2 MG/DL (ref 0.2–1)
BUN SERPL-MCNC: 35 MG/DL (ref 7–18)
BUN/CREAT SERPL: 16 (ref 12–20)
CALCIUM SERPL-MCNC: 9.2 MG/DL (ref 8.5–10.1)
CHLORIDE SERPL-SCNC: 114 MMOL/L (ref 100–111)
CO2 SERPL-SCNC: 20 MMOL/L (ref 21–32)
CREAT SERPL-MCNC: 2.21 MG/DL (ref 0.6–1.3)
GLOBULIN SER CALC-MCNC: 3.9 G/DL (ref 2–4)
GLUCOSE SERPL-MCNC: 90 MG/DL (ref 74–99)
POTASSIUM SERPL-SCNC: 4.8 MMOL/L (ref 3.5–5.5)
PROT SERPL-MCNC: 7.2 G/DL (ref 6.4–8.2)
SODIUM SERPL-SCNC: 143 MMOL/L (ref 136–145)

## 2020-10-29 PROCEDURE — 99214 OFFICE O/P EST MOD 30 MIN: CPT | Performed by: FAMILY MEDICINE

## 2020-10-29 PROCEDURE — 36415 COLL VENOUS BLD VENIPUNCTURE: CPT

## 2020-10-29 PROCEDURE — 80053 COMPREHEN METABOLIC PANEL: CPT

## 2020-10-29 RX ORDER — POTASSIUM CHLORIDE 1500 MG/1
20 TABLET, FILM COATED, EXTENDED RELEASE ORAL DAILY
COMMUNITY
Start: 2020-10-24 | End: 2020-10-29 | Stop reason: ALTCHOICE

## 2020-10-29 RX ORDER — TAMSULOSIN HYDROCHLORIDE 0.4 MG/1
0.4 CAPSULE ORAL
COMMUNITY
Start: 2020-05-15 | End: 2020-11-12

## 2020-10-29 RX ORDER — GUAIFENESIN 100 MG/5ML
81 LIQUID (ML) ORAL DAILY
Qty: 90 TAB | Refills: 1 | Status: SHIPPED | OUTPATIENT
Start: 2020-10-29 | End: 2021-05-24 | Stop reason: SDUPTHER

## 2020-10-29 RX ORDER — LEVOTHYROXINE SODIUM 50 UG/1
50 TABLET ORAL DAILY
COMMUNITY
End: 2020-10-29 | Stop reason: SDUPTHER

## 2020-10-29 RX ORDER — SPIRONOLACTONE 25 MG/1
25 TABLET ORAL DAILY
Qty: 30 TAB | Refills: 3 | Status: SHIPPED | OUTPATIENT
Start: 2020-10-29 | End: 2021-04-30 | Stop reason: SDUPTHER

## 2020-10-29 RX ORDER — ALBUTEROL SULFATE 90 UG/1
AEROSOL, METERED RESPIRATORY (INHALATION)
COMMUNITY
Start: 2020-10-22 | End: 2021-04-27 | Stop reason: SDUPTHER

## 2020-10-29 RX ORDER — AMIODARONE HYDROCHLORIDE 200 MG/1
TABLET ORAL
COMMUNITY
Start: 2020-10-22 | End: 2020-11-23 | Stop reason: DRUGHIGH

## 2020-10-29 NOTE — PROGRESS NOTES
1. Have you been to the ER, urgent care clinic since your last visit? Hospitalized since your last visit? Yes When: 7-21-20 [facility] for respiratory failure then went to rehab     2. Have you seen or consulted any other health care providers outside of the [address] [place] [address] since your last visit? Include any pap smears or colon screening.  No       Patients daughter stated patient has an upcoming appointment with a nephrologist

## 2020-11-16 NOTE — PROGRESS NOTES
[facility]    CC: F/U for Chronic Disease Management    HPI:     HTN:  -Prior amlodipine regimen discontinued due hypotension  -Had bradycardia on atenolol  -On no lasix due to ELADIA  -Has not been checking his BP at home  -Endorses issues with LE edema  -Not following any regular exercise regimen  -Diet is unchanged since last visit      AFib w/ RVR:  -Occured during hospitalization on 7/21/2020  -Issue resolved during hospitalization  -Discharged on Amiodarone and ASA regimen  -Eliquis discontinued due due to anemia and multiple bleeding issues      Hypothyroidism:  -Taking levothyroxine as prescribed  -Denies any side effects or issues with the medication  -TSH check on 7/24/2020 during hospitalization and it was normal      BPH:  -Taking tamsulosin as prescribed  -Reports not taking Proscar or terazosin  -Hospital D/C summary shows he was discharged on terazosin and tamsulosin  -Unclear whether regimen was discontinued by SNF  -Was in SNF for 3 weeks (Discharged on 10/22/2020)      COPD:  -Seeing Dr. Bimal King with pulmonary medicine  -Recently admitted for acute on chronic hypoxic respiratory failure 2/2 COPD exacerbation  -States it has been a while since he was seen  -Occasionally has a cough        ROS: Positive items marked in RED  CON: fever, chills  Cardiovascular: palpitations, CP  Resp: SOB, cough (Occasionally)  GI: nausea, vomiting, diarrhea  : dysuria, hematuria      Past Medical History:   Diagnosis Date    BPH (benign prostatic hyperplasia)     [facility] ([facility]     Hyperlipemia     Hypertension     IBS (irritable bowel syndrome)     Macrocytic anemia     Recurrent deep vein thrombosis (DVT) (HCC)     Thromboembolus (HCC)     lower extremity    Thyroid disease        No past surgical history on file. No family history on file.     Social History     Tobacco Use    Smoking status: Former Smoker    Smokeless tobacco: Never Used   Substance Use Topics  Alcohol use: No    Drug use: No       No Known Allergies      Current Outpatient Medications:     albuterol (PROVENTIL HFA, VENTOLIN HFA, PROAIR HFA) 90 mcg/actuation inhaler, GIVE 2 PUFFS BY MOUTH EVERY 4 HOURS AS NEEDED FOR COPD, Disp: , Rfl:     mirabegron ER (Myrbetriq) 25 mg ER tablet, Take 1 Tab by mouth., Disp: , Rfl:     tamsulosin (FLOMAX) 0.4 mg capsule, Take 0.4 mg by mouth., Disp: , Rfl:     amiodarone (CORDARONE) 200 mg tablet, GIVE 1 TABLET BY MOUTH ONCE A DAY FOR A-FIB, Disp: , Rfl:     potassium chloride SR (K-TAB) 20 mEq tablet, Take 20 mEq by mouth daily. , Disp: , Rfl:     albuterol-ipratropium (DUO-NEB) 2.5 mg-0.5 mg/3 ml nebu, inhale contents of 1 vial ( 3 milliliters ) in nebulizer by mouth and INTO THE LUNGS every 4 hours if needed for WHEEZING,SHORTNESS OF BREATH, AND/OR COUGH, Disp: 75 mL, Rfl: 0    levothyroxine (SYNTHROID) 50 mcg tablet, TAKE 1 TABLET BY MOUTH ONCE DAILY BEFORE BREAKFAST, Disp: 90 Tab, Rfl: 1    acetaminophen (TYLENOL) 500 mg tablet, Take 2 Tabs by mouth every six (6) hours as needed for Pain., Disp: 90 Tab, Rfl: 1    fluticasone-salmeterol (ADVAIR) 500-50 mcg/dose diskus inhaler, Take 1 Puff by inhalation every twelve (12) hours. , Disp: 60 Each, Rfl: 3    atorvastatin (LIPITOR) 40 mg tablet, take 1 tablet by mouth once daily, Disp: 90 Tab, Rfl: 1    furosemide (LASIX) 20 mg tablet, take 1 tablet by mouth once daily, Disp: 30 Tab, Rfl: 2    Eliquis 2.5 mg tablet, TAKE 1 TABLET BY MOUTH TWICE A DAY, Disp: 180 Tab, Rfl: 1    tiZANidine (ZANAFLEX) 2 mg tablet, take 1 tablet by mouth three times a day if needed for SPASMS, Disp: 90 Tab, Rfl: 2    atenoloL (TENORMIN) 25 mg tablet, take 1 tablet by mouth once daily, Disp: 90 Tab, Rfl: 1    terazosin (HYTRIN) 2 mg capsule, Take 1 Cap by mouth nightly., Disp: 30 Cap, Rfl: 5    omeprazole (PRILOSEC) 20 mg capsule, take 1 capsule by mouth once daily, Disp: 90 Cap, Rfl: 1    sodium chloride 0.9 % nebu, 2.5 mL by Nebulization route as needed. , Disp: , Rfl:     finasteride (PROSCAR) 5 mg tablet, Take 1 Tab by mouth daily. , Disp: 90 Tab, Rfl: 1    amLODIPine (NORVASC) 10 mg tablet, TAKE 1/2 TABLET BY MOUTH DAILY, Disp: 90 Tab, Rfl: 3    Physical Exam:      /81   Pulse 76   Temp 97.3 °F (36.3 °C) (Temporal)   Resp 16   Ht 5' 11.5\" (1.816 m)   Wt 178 lb (80.7 kg)   SpO2 95%   BMI 24.48 kg/m²     General:  WD, WN, NAD, conversant  Eyes: sclera clear bilaterally, no discharge noted, eyelids normal in appearance  HENT: NCAT  Lungs: CTAB, normal respiratory effort and rate  CV: RRR, no MRGs  ABD: soft, non-tender, non-distended, normal bowel sounds  Ext: bilateral pitting edema  Skin: normal temperature, turgor, color, and texture  Psych: alert and oriented to person, place and situation, normal affect  Neuro: speech normal, moving all extremities      Assessment/Plan     HTN/Bilateral LE Edema:  -Started on spironolactone regimen  -CMP ordered  -Follow-up in 1 month      Hypothyroidism, well controlled:  -Will continue current levothyroxine regimen  -Follow-up in 1 month      BPH:  -Officially discontinued prior Proscar regimen  -Will continue current tamsulosin regimen  -Need to obtain SNF records for review  -Follow-up in 1 month      COPD:  -Will defer management/evaluation to pulmonary specialist  -Follow-up in 1 month      ELADIA, likely resolving/improving:  -2/2 recent illness  -CMP ordered  -Follow-up in 1 month      A.  FiB w/ RVR, resolved:  -Will defer management to cardiology  -Follow-up in 1 month        Paula Rivers MD  10/29/2020, 10:33 AM

## 2020-11-23 ENCOUNTER — OFFICE VISIT (OUTPATIENT)
Dept: FAMILY MEDICINE CLINIC | Age: 85
End: 2020-11-23
Payer: COMMERCIAL

## 2020-11-23 VITALS
BODY MASS INDEX: 23.84 KG/M2 | OXYGEN SATURATION: 92 % | TEMPERATURE: 97.5 F | WEIGHT: 176 LBS | DIASTOLIC BLOOD PRESSURE: 62 MMHG | HEART RATE: 74 BPM | HEIGHT: 72 IN | RESPIRATION RATE: 16 BRPM | SYSTOLIC BLOOD PRESSURE: 110 MMHG

## 2020-11-23 DIAGNOSIS — N17.9 AKI (ACUTE KIDNEY INJURY) (HCC): Primary | ICD-10-CM

## 2020-11-23 DIAGNOSIS — J44.9 CHRONIC OBSTRUCTIVE PULMONARY DISEASE, UNSPECIFIED COPD TYPE (HCC): ICD-10-CM

## 2020-11-23 DIAGNOSIS — N32.81 OVERACTIVE BLADDER: ICD-10-CM

## 2020-11-23 DIAGNOSIS — I48.91 ATRIAL FIBRILLATION WITH RVR (HCC): ICD-10-CM

## 2020-11-23 PROCEDURE — 99214 OFFICE O/P EST MOD 30 MIN: CPT | Performed by: FAMILY MEDICINE

## 2020-11-23 RX ORDER — POTASSIUM CHLORIDE 20 MEQ/1
TABLET, EXTENDED RELEASE ORAL
COMMUNITY
Start: 2020-11-12 | End: 2020-11-23 | Stop reason: ALTCHOICE

## 2020-11-23 RX ORDER — AMIODARONE HYDROCHLORIDE 100 MG/1
100 TABLET ORAL DAILY
Qty: 30 TAB | Refills: 0 | Status: SHIPPED | OUTPATIENT
Start: 2020-11-23 | End: 2020-12-15

## 2020-11-23 RX ORDER — IPRATROPIUM BROMIDE AND ALBUTEROL SULFATE 2.5; .5 MG/3ML; MG/3ML
SOLUTION RESPIRATORY (INHALATION)
Qty: 30 NEBULE | Refills: 0 | Status: SHIPPED | OUTPATIENT
Start: 2020-11-23 | End: 2021-08-30 | Stop reason: SDUPTHER

## 2020-11-23 RX ORDER — AMIODARONE HYDROCHLORIDE 200 MG/1
TABLET ORAL
Qty: 30 TAB | Refills: 2 | Status: CANCELLED | OUTPATIENT
Start: 2020-11-23

## 2020-11-23 RX ORDER — IPRATROPIUM BROMIDE AND ALBUTEROL SULFATE 2.5; .5 MG/3ML; MG/3ML
SOLUTION RESPIRATORY (INHALATION)
Qty: 75 ML | Refills: 1 | Status: CANCELLED | OUTPATIENT
Start: 2020-11-23

## 2020-11-23 RX ORDER — POTASSIUM CHLORIDE 20 MEQ/1
20 TABLET, EXTENDED RELEASE ORAL DAILY
Qty: 90 TAB | Refills: 1 | Status: CANCELLED | OUTPATIENT
Start: 2020-11-23

## 2020-11-23 NOTE — PROGRESS NOTES
[facility]    CC: F/U for Chronic Disease Management    HPI:       A. Fib:  -Has no appointment with cardiology  -Taking ASA and amiodarone as prescribed  -Denies any side effects or issues with the medication regiment      COPD:  -Saw Pulmonary specialist on 11/2/2020  -No changes done to his medication at that time  -Report he has been dealing with a cough that is productive of white phlegm (Did not discuss the with the specialist)      ELADIA:  -Got requested lab work  -Denies any urinary issues  -Will see nephrology on the 30th of this month      ROS: Positive items marked in RED  CON: fever, chills  Cardiovascular: palpitations, CP  Resp: SOB, cough  GI: nausea, vomiting, diarrhea  : dysuria, hematuria      Past Medical History:   Diagnosis Date    BPH (benign prostatic hyperplasia)     Chronic obstructive [address] disease ([address]     Hyperlipemia     Hypertension     Hypothyroidism     IBS (irritable bowel syndrome)     Macrocytic anemia     [facility]) [facility]     Thromboembolus ([facility]     lower extremity       History reviewed. No pertinent surgical history. History reviewed. No pertinent family history.     Social History     Tobacco Use    Smoking status: Former Smoker    Smokeless tobacco: Never Used   Substance Use Topics    Alcohol use: No    Drug use: No       No Known Allergies      Current Outpatient Medications:     potassium chloride (K-DUR, KLOR-CON) 20 mEq tablet, , Disp: , Rfl:     levothyroxine (SYNTHROID) 50 mcg tablet, GIVE 1 TABLET BY MOUTH ONCE A DAY FOR THYROID, Disp: 90 Tab, Rfl: 1    tamsulosin (FLOMAX) 0.4 mg capsule, GIVE 1 CAPSULE BY MOUTH ONCE A DAY FOR BPH, Disp: 90 Cap, Rfl: 1    Myrbetriq 25 mg ER tablet, GIVE 1 TABLET BY MOUTH ONCE EVERY OTHER DAY FOR OVERACTIVE BLADDER, Disp: 90 Tab, Rfl: 1    albuterol (PROVENTIL HFA, VENTOLIN HFA, PROAIR HFA) 90 mcg/actuation inhaler, GIVE 2 PUFFS BY MOUTH EVERY 4 HOURS AS NEEDED FOR COPD, Disp: , Rfl:     amiodarone (CORDARONE) 200 mg tablet, GIVE 1 TABLET BY MOUTH ONCE A DAY FOR A-FIB, Disp: , Rfl:     spironolactone (ALDACTONE) 25 mg tablet, Take 1 Tab by mouth daily. , Disp: 30 Tab, Rfl: 3    aspirin 81 mg chewable tablet, Take 1 Tab by mouth daily. , Disp: 90 Tab, Rfl: 1    albuterol-ipratropium (DUO-NEB) 2.5 mg-0.5 mg/3 ml nebu, inhale contents of 1 vial ( 3 milliliters ) in nebulizer by mouth and INTO THE LUNGS every 4 hours if needed for WHEEZING,SHORTNESS OF BREATH, AND/OR COUGH, Disp: 75 mL, Rfl: 0    acetaminophen (TYLENOL) 500 mg tablet, Take 2 Tabs by mouth every six (6) hours as needed for Pain., Disp: 90 Tab, Rfl: 1    fluticasone-salmeterol (ADVAIR) 500-50 mcg/dose diskus inhaler, Take 1 Puff by inhalation every twelve (12) hours. , Disp: 60 Each, Rfl: 3    Physical Exam:      /62   Pulse 74   Temp 97.5 °F (36.4 °C) (Temporal)   Resp 16   Ht 5' 11.5\" (1.816 m)   Wt 176 lb (79.8 kg)   SpO2 92%   BMI 24.20 kg/m²     General:  WD, WN, NAD, conversant  Eyes: sclera clear bilaterally, no discharge noted, eyelids normal in appearance  HENT: NCAT  Lungs: CTAB, normal respiratory effort and rate  CV: RRR, no MRGs  ABD: soft, non-tender, non-distended, normal bowel sounds  Ext: no peripheral edema or digital cyanosis noted  Skin: normal temperature, turgor, color, and texture  Psych: alert and oriented to person, place and situation, normal affect  Neuro: speech normal, moving all extremities, ambulating with walker    Results for Thai Nelson (MRN [id number]):   Ref.  Range 10/29/2020 11:02   Sodium Latest Ref Range: 136 - 145 mmol/L 143   Potassium Latest Ref Range: 3.5 - 5.5 mmol/L 4.8   Chloride Latest Ref Range: 100 - 111 mmol/L 114 (H)   CO2 Latest Ref Range: 21 - 32 mmol/L 20 (L)   Anion gap Latest Ref Range: 3.0 - 18 mmol/L 9   Glucose Latest Ref Range: 74 - 99 mg/dL 90   BUN Latest Ref Range: 7.0 - 18 MG/DL 35 (H)   Creatinine Latest Ref Range: 0.6 - 1.3 MG/DL 2.21 (H)   BUN/Creatinine ratio Latest Ref Range: 12 - 20   16   Calcium Latest Ref Range: 8.5 - 10.1 MG/DL 9.2   GFR est non-AA Latest Ref Range: >60 ml/min/1.73m2 28 (L)   GFR est AA Latest Ref Range: >60 ml/min/1.73m2 34 (L)   Bilirubin, total Latest Ref Range: 0.2 - 1.0 MG/DL 0.2   Protein, total Latest Ref Range: 6.4 - 8.2 g/dL 7.2   Albumin Latest Ref Range: 3.4 - 5.0 g/dL 3.3 (L)   Globulin Latest Ref Range: 2.0 - 4.0 g/dL 3.9   A-G Ratio Latest Ref Range: 0.8 - 1.7   0.8   ALT Latest Ref Range: 16 - 61 U/L 10 (L)   AST Latest Ref Range: 10 - 38 U/L 16   Alk.  phosphatase Latest Ref Range: 45 - 117 U/L 58       Assessment/Plan     ELADIA, worsening:  -Etiology unclear  -Encouraged to see nephrologist for evaluation  -Follow-up in 1 month      COPD:  -Likely inadequately controlled  -Started on Spiriva and PRN Duo-Neb regimen  -Advised to contact his Pulmonary specialist  -CXR ordered  -Follow-up in 1 month      A. Fib, well controlled:  -Rhythm appears to remain normal on current regimen  -Will continue current ASA and amiodarone regimen   -Instructed to see cardiologist for evaluation/management  -Follow-up in 1 month        Marcus Coronado MD  11/23/2020, 2:32 PM

## 2020-12-03 ENCOUNTER — OFFICE VISIT (OUTPATIENT)
Dept: CARDIOLOGY CLINIC | Age: 85
End: 2020-12-03
Payer: COMMERCIAL

## 2020-12-03 VITALS
DIASTOLIC BLOOD PRESSURE: 90 MMHG | BODY MASS INDEX: 25 KG/M2 | HEART RATE: 85 BPM | TEMPERATURE: 97.9 F | OXYGEN SATURATION: 91 % | HEIGHT: 71 IN | SYSTOLIC BLOOD PRESSURE: 159 MMHG | WEIGHT: 178.6 LBS | RESPIRATION RATE: 16 BRPM

## 2020-12-03 DIAGNOSIS — E78.00 PURE HYPERCHOLESTEROLEMIA: ICD-10-CM

## 2020-12-03 DIAGNOSIS — I48.0 PAF (PAROXYSMAL ATRIAL FIBRILLATION) (HCC): Primary | ICD-10-CM

## 2020-12-03 PROCEDURE — 99214 OFFICE O/P EST MOD 30 MIN: CPT | Performed by: INTERNAL MEDICINE

## 2020-12-03 NOTE — LETTER
12/3/20 Patient: Otf Hall YOB: 1933 Date of Visit: 12/3/2020 Ioana Gaitan MD 
[facility] [phone] VIA In Basket Dear Ioana Gaitan MD, Thank you for referring Mr. Otf Hall to [address] [place] [address] for evaluation. My notes for this consultation are attached. If you have questions, please do not hesitate to call me. I look forward to following your patient along with you. Sincerely, Ryanne Anderson MD

## 2020-12-03 NOTE — PROGRESS NOTES
Jay Stubbs presents today for   Chief Complaint   Patient presents with   [facility] Follow Up       Jay Stubbs preferred language for health care discussion is english/other. Personal Protective Equipment:   Personal Protective Equipment was used including: mask-surgical and hands-gloves. Patient was placed on no precaution(s). Patient was masked. Is someone accompanying this pt? No    Is the patient using any DME equipment during OV? Yes; Walker    Depression Screening:  3 most recent PHQ Screens 1/27/2020   Little interest or pleasure in doing things Not at all   Feeling down, depressed, irritable, or hopeless Several days   Total Score PHQ 2 1       Learning Assessment:  Learning Assessment 3/28/2018   PRIMARY LEARNER Patient   HIGHEST LEVEL OF EDUCATION - PRIMARY LEARNER  DID NOT GRADUATE HIGH SCHOOL   BARRIERS PRIMARY LEARNER NONE   CO-LEARNER CAREGIVER No   PRIMARY LANGUAGE ENGLISH   LEARNER PREFERENCE PRIMARY READING   ANSWERED BY Patient   RELATIONSHIP SELF       Abuse Screening:  Abuse Screening Questionnaire 3/28/2018   Do you ever feel afraid of your partner? N   Are you in a relationship with someone who physically or mentally threatens you? N   Is it safe for you to go home? Y       Fall Risk  Fall Risk Assessment, last 12 mths 5/16/2019   Able to walk? Yes   Fall in past 12 months? No       Pt currently taking Anticoagulant therapy? No    Coordination of Care:  1. Have you been to the ER, urgent care clinic since your last visit? Hospitalized since your last visit? Yes; [facility] ER 7/21/2020 - Respiratory Failure    2. Have you seen or consulted any other health care providers outside of the [address] [place] [address] since your last visit? Include any pap smears or colon screening.  No

## 2020-12-03 NOTE — PROGRESS NOTES
[facility]    Mr. French Waldron is a 66-year-old male with a history of hypertension, hyperlipidemia, BPH, COPD and other multiple medical problem    Patient is here today for initial evaluation. He denies any prior history of myocardial infarction or congestive heart failure. He denies any history of atrial fibrillation as far as he can tell. Patient was admitted with a [facility] with multiple medical problem in August.  During that hospitalization he was also found to have atrial fibrillation. He is here today and he is denying any specific cardiac complaint. He has some lower back pain. He denies any chest pain or chest tightness concerning for angina or heart failure. He takes his medication regularly. He tells me that he was taking Eliquis in the past however this was discontinued. He is taking aspirin. He denies any awareness of any palpitation. He is using walker to walk around. He is unsteady on his feet and he tells me that he fell without any loss of consciousness about month ago. Denies any nausea, vomiting, abdominal pain, fever, chills, sputum production. No hematuria or other bleeding complaints    Past Medical History:   Diagnosis Date    BPH (benign prostatic hyperplasia)     Chronic obstructive pulmonary disease (HCC)     Hyperlipemia     Hypertension     Hypothyroidism     IBS (irritable bowel syndrome)     Macrocytic anemia     Recurrent deep vein thrombosis (DVT) (HCC)     Thromboembolus (HCC)     lower extremity       Review of Systems:  Cardiac symptoms as noted above in HPI. All others negative. Denies fatigue, malaise, skin rash, joint pain, blurring vision, photophobia, neck pain, hemoptysis, chronic cough, nausea, vomiting, hematuria, burning micturition, BRBPR, chronic headaches.     Current Outpatient Medications   Medication Sig    tiotropium bromide (SPIRIVA RESPIMAT) 2.5 mcg/actuation inhaler Take 2 Puffs by inhalation daily.  amiodarone (PACERONE) 100 mg tablet Take 1 Tab by mouth daily.  albuterol-ipratropium (DUO-NEB) 2.5 mg-0.5 mg/3 ml nebu inhale contents of 1 vial ( 3 milliliters ) in nebulizer by mouth and INTO THE LUNGS every 4 hours if needed for WHEEZING,SHORTNESS OF BREATH, AND/OR COUGH    mirabegron ER (Myrbetriq) 25 mg ER tablet GIVE 1 TABLET BY MOUTH ONCE EVERY OTHER DAY FOR OVERACTIVE BLADDER    levothyroxine (SYNTHROID) 50 mcg tablet GIVE 1 TABLET BY MOUTH ONCE A DAY FOR THYROID    tamsulosin (FLOMAX) 0.4 mg capsule GIVE 1 CAPSULE BY MOUTH ONCE A DAY FOR BPH    albuterol (PROVENTIL HFA, VENTOLIN HFA, PROAIR HFA) 90 mcg/actuation inhaler GIVE 2 PUFFS BY MOUTH EVERY 4 HOURS AS NEEDED FOR COPD    spironolactone (ALDACTONE) 25 mg tablet Take 1 Tab by mouth daily.  aspirin 81 mg chewable tablet Take 1 Tab by mouth daily.  acetaminophen (TYLENOL) 500 mg tablet Take 2 Tabs by mouth every six (6) hours as needed for Pain.  fluticasone-salmeterol (ADVAIR) 500-50 mcg/dose diskus inhaler Take 1 Puff by inhalation every twelve (12) hours. No current facility-administered medications for this visit. No past surgical history on file. Allergies and Sensitivities:  No Known Allergies    Family History:  No family history on file. Social History:  Social History     Tobacco Use    Smoking status: Former Smoker    Smokeless tobacco: Never Used   Substance Use Topics    Alcohol use: No    Drug use: No     He  reports that he has quit smoking. He has never used smokeless tobacco.  He  reports no history of alcohol use.     Physical Exam:  BP Readings from Last 3 Encounters:   12/03/20 (!) 159/90   11/23/20 110/62   10/29/20 132/81         Pulse Readings from Last 3 Encounters:   12/03/20 85   11/23/20 74   10/29/20 76          Wt Readings from Last 3 Encounters:   12/03/20 178 lb 9.6 oz (81 kg)   11/23/20 176 lb (79.8 kg)   10/29/20 178 lb (80.7 kg)       Constitutional: Oriented to person, place, and time. HENT: Head: Normocephalic and atraumatic. Neck: No JVD present. Cardiovascular: Regular rhythm. No murmur, gallop or rubs appreciated  Lung: Breath sounds normal. No respiratory distress. No ronchi or rales appreciated  Abdominal: No tenderness. No rebound and no guarding. Musculoskeletal: There is no lower extremity edema. No cynosis  Lymphadenopathy:  No cervical or supraclavicular adenopathy appriciated. Neurological: No gross motor deficit noted. Skin: No visible skin rash noted. No Ear discharge noted      LABS:   Lab Results   Component Value Date/Time    Sodium 143 10/29/2020 11:02 AM    Potassium 4.8 10/29/2020 11:02 AM    Chloride 114 (H) 10/29/2020 11:02 AM    CO2 20 (L) 10/29/2020 11:02 AM    Glucose 90 10/29/2020 11:02 AM    BUN 35 (H) 10/29/2020 11:02 AM    Creatinine 2.21 (H) 10/29/2020 11:02 AM     Lipids Latest Ref Rng & Units 7/23/2019 6/21/2018   Chol, Total <200 MG/ 138   HDL 40 - 60 MG/DL 45 59   LDL 0 - 99 mg/dL - 75   LDL 0 - 100 MG/DL 67.2 -   Trig <150 MG/DL 64 -   Chol/HDL Ratio 0 - 5.0   2.8 -   Some recent data might be hidden     Lab Results   Component Value Date/Time    ALT (SGPT) 10 (L) 10/29/2020 11:02 AM     No results found for: HBA1C, HGBE8, VHX8RRKG, HBQ8VBGS  Lab Results   Component Value Date/Time    TSH 2.03 07/23/2019 11:13 AM       EKG    ECHO (08/20)  MILDLY INCREASED LEFT VENTRICULAR CAVITY SIZE. NORMAL LEFT VENTRICULAR SYSTOLIC FUNCTION WITH AN ESTIMATED EJECTION FRACTION OF 65%. MILD DIASTOLIC DYSFUNCTION. MODERATELY DILATED LEFT ATRIUM. NORMAL RIGHT VENTRICULAR SIZE AND SYSTOLIC FUNCTION VISUALLY. NO HEMODYNAMICALLY SIGNIFICANT VALVULAR PATHOLOGY. NORMAL PULMONARY ARTERY PRESSURE OF 32 MMHG. MODERATE AORTIC ROOT AND THE ASCENDING AORTA DILATATION. TRIVIAL TO SMALL ANTERIOR PERICARDIAL EFFUSION PRESENT. NO EXCESSIVE RESPIRATORY VARIATION PRESENT.   STRESS TEST (EST, PHARM, NUC, ECHO etc)    CATHETERIZATION    IMPRESSION & PLAN:  Mr. Keerthi Telles is a 87-year male with multiple medical problem    Atrial fibrillation:  Diagnosed in August 2020 during hospitalization in the setting of multiple physiologic stress  Currently patient is on aspirin and amiodarone. On exam he appears to be in sinus rhythm. In the past patient was on Eliquis for DVT however after hospitalization this was discontinued because of anemia and GI bleed  I agree to continue with aspirin and amiodarone for now    Diastolic heart failure:  Patient had echocardiogram in August 2020 with normal ejection fraction. Patient has mild diastolic dysfunction. No significant fluid overload on exam.    History of blood loss anemia:  Patient had GI bleed in August 2020  Underwent EGD in August 2020 which showed mucosal tear in esophagus, nonbleeding duodenal ulcer for which 3 hemoclips were placed. I will defer management to primary team    History of COVID-19 infection: This was in August 2020. Currently does not appear to have any fever chills    History of DVT and PE:  Initially patient was on anticoagulation with Eliquis however this was discontinued in August 2020 because of GI bleed and thrombocytopenia. Currently on aspirin. Patient had IVC filter in 2013.  7/2020 B/L LE PVL -\"Chronic, non-occlusive deep venous thrombosis involving the right common femoral, deep femoral, femoral, popliteal, gastrocnemius, posterior tibial and peroneal veins. Defer to PCP    This plan was discussed with patient who is in agreement. Thank you for allowing me to participate in patient care. Please feel free to call me if you have any question or concern. Logan Carrera MD  Please note: This document has been produced using voice recognition software. Unrecognized errors in transcription may be present.

## 2020-12-15 DIAGNOSIS — I48.91 ATRIAL FIBRILLATION WITH RVR (HCC): ICD-10-CM

## 2020-12-16 RX ORDER — AMIODARONE HYDROCHLORIDE 100 MG/1
TABLET ORAL
Qty: 90 TAB | Refills: 1 | Status: SHIPPED | OUTPATIENT
Start: 2020-12-16 | End: 2021-01-07 | Stop reason: SDUPTHER

## 2020-12-23 ENCOUNTER — OFFICE VISIT (OUTPATIENT)
Dept: FAMILY MEDICINE CLINIC | Age: 85
End: 2020-12-23
Payer: COMMERCIAL

## 2020-12-23 ENCOUNTER — HOSPITAL ENCOUNTER (OUTPATIENT)
Dept: LAB | Age: 85
Discharge: HOME | End: 2020-12-23
Payer: COMMERCIAL

## 2020-12-23 VITALS
BODY MASS INDEX: 24.64 KG/M2 | WEIGHT: 176 LBS | DIASTOLIC BLOOD PRESSURE: 76 MMHG | HEIGHT: 71 IN | OXYGEN SATURATION: 98 % | SYSTOLIC BLOOD PRESSURE: 132 MMHG | RESPIRATION RATE: 16 BRPM | TEMPERATURE: 98 F | HEART RATE: 79 BPM

## 2020-12-23 DIAGNOSIS — N17.9 AKI (ACUTE KIDNEY INJURY) (HCC): ICD-10-CM

## 2020-12-23 DIAGNOSIS — B88.8 INFESTATION BY BED BUG: Primary | ICD-10-CM

## 2020-12-23 LAB
BILIRUB UR QL STRIP: NEGATIVE
GLUCOSE UR-MCNC: NEGATIVE MG/DL
KETONES P FAST UR STRIP-MCNC: NEGATIVE MG/DL
PH UR STRIP: 5.5 [PH] (ref 4.6–8)
PROT UR QL STRIP: NORMAL
SP GR UR STRIP: 1.02 (ref 1–1.03)
UA UROBILINOGEN AMB POC: NORMAL (ref 0.2–1)
URINALYSIS CLARITY POC: CLEAR
URINALYSIS COLOR POC: YELLOW
URINE BLOOD POC: NEGATIVE
URINE LEUKOCYTES POC: NEGATIVE
URINE NITRITES POC: NEGATIVE

## 2020-12-23 PROCEDURE — 87086 URINE CULTURE/COLONY COUNT: CPT

## 2020-12-23 PROCEDURE — 99213 OFFICE O/P EST LOW 20 MIN: CPT | Performed by: FAMILY MEDICINE

## 2020-12-23 PROCEDURE — 81003 URINALYSIS AUTO W/O SCOPE: CPT | Performed by: FAMILY MEDICINE

## 2020-12-23 NOTE — PROGRESS NOTES
Sandrine Guzman    CC: ELADIA    HPI:       ELADIA:  -Saw kidney specialist for evaluation  -States specialist told him he was good  -Reports he was told to follow up in 3 month  -Currently denies any urinary symptoms/issues other than urinary frequency due to BPH  -Admits to feeling rundown      Bedbugs:  -Patient unaware of return of bedbugs  -States he had a home fumigated  -Concerned over cost of getting home refumigated      ROS: Positive items marked in RED  CON: fever, chills  Cardiovascular: palpitations, CP  Resp: SOB, cough  GI: nausea, vomiting, diarrhea  : dysuria, hematuria    Past Medical History:   Diagnosis Date    [facility] ([facility]     BPH (benign prostatic hyperplasia)     Chronic obstructive pulmonary disease ([facility]     Hyperlipemia     Hypertension     Hypothyroidism     IBS (irritable bowel syndrome)     Macrocytic anemia     Recurrent deep vein thrombosis (DVT) ([facility]     Thromboembolus ([facility]     lower extremity       No past surgical history on file. No family history on file. Social History     Tobacco Use    Smoking status: Former Smoker    Smokeless tobacco: Never Used   Substance Use Topics    Alcohol use: No    Drug use: No       No Known Allergies      Current Outpatient Medications:     amiodarone (PACERONE) 100 mg tablet, take 1 tablet by mouth once daily, Disp: 90 Tab, Rfl: 1    tiotropium bromide (SPIRIVA RESPIMAT) 2.5 mcg/actuation inhaler, Take 2 Puffs by inhalation daily. , Disp: 1 Inhaler, Rfl: 5    albuterol-ipratropium (DUO-NEB) 2.5 mg-0.5 mg/3 ml nebu, inhale contents of 1 vial ( 3 milliliters ) in nebulizer by mouth and INTO THE LUNGS every 4 hours if needed for WHEEZING,SHORTNESS OF BREATH, AND/OR COUGH, Disp: 30 Nebule, Rfl: 0    mirabegron ER (Myrbetriq) 25 mg ER tablet, GIVE 1 TABLET BY MOUTH ONCE EVERY OTHER DAY FOR OVERACTIVE BLADDER, Disp: 90 Tab, Rfl: 1    levothyroxine (SYNTHROID) 50 mcg tablet, GIVE 1 TABLET BY MOUTH ONCE A DAY FOR THYROID, Disp: 90 Tab, Rfl: 1    tamsulosin (FLOMAX) 0.4 mg capsule, GIVE 1 CAPSULE BY MOUTH ONCE A DAY FOR BPH, Disp: 90 Cap, Rfl: 1    albuterol (PROVENTIL HFA, VENTOLIN HFA, PROAIR HFA) 90 mcg/actuation inhaler, GIVE 2 PUFFS BY MOUTH EVERY 4 HOURS AS NEEDED FOR COPD, Disp: , Rfl:     spironolactone (ALDACTONE) 25 mg tablet, Take 1 Tab by mouth daily. , Disp: 30 Tab, Rfl: 3    aspirin 81 mg chewable tablet, Take 1 Tab by mouth daily. , Disp: 90 Tab, Rfl: 1    acetaminophen (TYLENOL) 500 mg tablet, Take 2 Tabs by mouth every six (6) hours as needed for Pain., Disp: 90 Tab, Rfl: 1    fluticasone-salmeterol (ADVAIR) 500-50 mcg/dose diskus inhaler, Take 1 Puff by inhalation every twelve (12) hours. , Disp: 60 Each, Rfl: 3    Physical Exam:      /76   Pulse 79   Temp 98 °F (36.7 °C) (Temporal)   Resp 16   Ht 5' 11\" (1.803 m)   Wt 176 lb (79.8 kg)   SpO2 98%   BMI 24.55 kg/m²     General:  WD, WN, NAD, conversant, two bedbugs were spotted crawling on patient  Eyes: sclera clear bilaterally, no discharge noted, eyelids normal in appearance  HENT: NCAT  Lungs: CTAB, normal respiratory effort and rate  CV: RRR, no MRGs  ABD: soft, non-tender, non-distended, normal bowel sounds  Skin: normal temperature, turgor, color, and texture  Psych: alert and oriented to person, place and situation, normal affect  Neuro: speech normal, moving all extremities, ambulating with walker    Results for Chayo Lange (MRN [id number]):   Ref.  Range 12/23/2020 14:09   Color (UA POC) Unknown Yellow   Clarity (UA POC) Unknown Clear   Specific gravity (UA POC) Latest Ref Range: 1.001 - 1.035  1.020   pH (UA POC) Latest Ref Range: 4.6 - 8.0  5.5   Protein (UA POC) Latest Ref Range: Negative  1+   Glucose (UA POC) Latest Ref Range: Negative  Negative   Ketones (UA POC) Latest Ref Range: Negative  Negative   Blood (UA POC) Latest Ref Range: Negative  Negative   Bilirubin (UA POC) Latest Ref Range: Negative  Negative Urobilinogen (UA POC) Latest Ref Range: 0.2 - 1  0.2 mg/dL   Nitrites (UA POC) Latest Ref Range: Negative  Negative   Leukocyte esterase (UA POC) Latest Ref Range: Negative  Negative         Assessment/Plan     Bedbug infestation:  -Patient was shown one of the bedbugs that were crawling on his torso  -Advised on importance of getting this addressed as soon as possible  -Recommended he discuss issue with family members due to his concerns of the cost of fumigation  -Handout given on bedbug care  -Follow-up in 1 month to establish care with new PCP      ELADIA:  -Current status unclear  -Urine culture ordered to ensure that he does not have a UTI that is causing the issue (BPH maybe masking symptoms)  -Will defer further management/evaluation to nephrology  -Need to obtain records from nephrologist for review  -Follow-up in 1 month to establish care with new PCP        Lisbet Lewis MD  12/23/2020, 1:41 PM

## 2020-12-23 NOTE — PATIENT INSTRUCTIONS
Bedbugs: Care Instructions  Your Care Instructions     Bedbugs are tiny bugs that feed on blood from animals or people. They have that name because they like to hide in bedding and mattresses. Bedbugs are not known to spread disease to people. But itching from the bites can be so bad that you may scratch hard enough to break the skin. That can lead to infection. The bites can also cause an allergic reaction in some people. The bugs can spread into cracks and crevices in the room and lay their eggs in anything that is in the room, including clothing and furniture. This makes them very hard to kill. Getting rid of bedbugs  The best way to get rid of bedbugs is to call a professional pest control company. They use special pesticides and other equipment to kill the bugs and their eggs. If you decide to buy your own pesticide, check the label. Make sure it says that it is for bedbugs. Be sure to follow the directions carefully. You may have to use the pesticide more than once. Do other cleaning steps such as:  · Vacuum often. Be sure to empty the vacuum after each use. If you use a vacuum bag, seal it and throw it out in an outdoor trash can. If you don't use a vacuum bag, empty the container and clean it with hot, soapy water. · Launder things that might hide bugs. Washing and then drying items in a dryer on a hot setting is adequate to kill bedbugs in clothing or linens. Turn the dryer to the hottest setting that the fabric can handle. · Use mattress, box spring, and pillow (encasement) sacks to trap bed bugs and help get rid of them. Be sure to follow the directions on the package. After your mattress has been cleared of bedbugs, you can buy a special mattress cover that is made to keep bedbugs out of your mattress. Follow-up care is a key part of your treatment and safety. Be sure to make and go to all appointments, and call your doctor if you are having problems.  It's also a good idea to know your test results and keep a list of the medicines you take. How can you care for yourself at home? · Wash the bites with soap to lower the chance of infection. Try not to scratch. · Use calamine lotion or an anti-itch cream to stop the itching. You can also hold an oatmeal-soaked washcloth on the itchy area for 15 minutes. You can buy an oatmeal powder, such as Aveeno Colloidal Oatmeal, in drugstores. · Use an ice pack to stop the swelling. When should you call for help? Call your doctor now or seek immediate medical care if:    · You have signs of infection, such as:  ? Increased pain, swelling, warmth, or redness. ? Red streaks leading from a bite area. ? Pus draining from a bite area. ? A fever. Watch closely for changes in your health, and be sure to contact your doctor if:    · Anyone else in your family has itching.     · You do not get better as expected. Where can you learn more? Go to http://www.gray.com/  Enter G246 in the search box to learn more about \"Bedbugs: Care Instructions. \"  Current as of: June 26, 2019               Content Version: 12.6  © 4791-8638 [employer]. Care instructions adapted under license by [employer] (which disclaims liability or warranty for this information). If you have questions about a medical condition or this instruction, always ask your healthcare professional. [place] [address] any warranty or liability for your use of this information. No

## 2020-12-23 NOTE — PROGRESS NOTES
1. Have you been to the ER, urgent care clinic since your last visit? Hospitalized since your last visit? No    2. Have you seen or consulted any other health care providers outside of the [address] [place] [address] since your last visit? Include any pap smears or colon screening.  Yes follow up with Nephrology and cardiology

## 2020-12-25 LAB
BACTERIA SPEC CULT: NORMAL
SERVICE CMNT-IMP: NORMAL

## 2021-01-01 ENCOUNTER — TELEPHONE (OUTPATIENT)
Dept: FAMILY MEDICINE CLINIC | Age: 86
End: 2021-01-01

## 2021-01-01 DIAGNOSIS — J44.1 COPD EXACERBATION (HCC): ICD-10-CM

## 2021-01-01 DIAGNOSIS — Z60.2 LIVES ALONE WITHOUT HELP AVAILABLE: ICD-10-CM

## 2021-01-01 DIAGNOSIS — R54 ADVANCED AGE: ICD-10-CM

## 2021-01-01 DIAGNOSIS — J44.9 CHRONIC OBSTRUCTIVE PULMONARY DISEASE, UNSPECIFIED COPD TYPE (HCC): Primary | ICD-10-CM

## 2021-01-01 DIAGNOSIS — R13.10 DYSPHAGIA, UNSPECIFIED TYPE: ICD-10-CM

## 2021-01-01 DIAGNOSIS — R41.3 MEMORY DEFICIT: ICD-10-CM

## 2021-01-01 DIAGNOSIS — R54 FRAILTY: ICD-10-CM

## 2021-01-01 DIAGNOSIS — R54 FRAILTY: Primary | ICD-10-CM

## 2021-01-01 DIAGNOSIS — J44.9 CHRONIC OBSTRUCTIVE PULMONARY DISEASE, UNSPECIFIED COPD TYPE (HCC): ICD-10-CM

## 2021-01-01 DIAGNOSIS — M54.2 NECK PAIN: ICD-10-CM

## 2021-01-01 RX ORDER — IPRATROPIUM BROMIDE AND ALBUTEROL SULFATE 2.5; .5 MG/3ML; MG/3ML
SOLUTION RESPIRATORY (INHALATION)
Qty: 30 NEBULE | Refills: 0 | Status: SHIPPED | OUTPATIENT
Start: 2021-01-01

## 2021-01-01 SDOH — SOCIAL STABILITY - SOCIAL INSECURITY: PROBLEMS RELATED TO LIVING ALONE: Z60.2

## 2021-01-07 DIAGNOSIS — I48.91 ATRIAL FIBRILLATION WITH RVR (HCC): ICD-10-CM

## 2021-01-07 NOTE — TELEPHONE ENCOUNTER
PCP: No primary care provider on file.     Last appt: 12/3/2020  Future Appointments   Date Time Provider Blossom Lund   1/25/2021 10:00 AM Navi Rutledge MD [facility] AMB       Requested Prescriptions     Pending Prescriptions Disp Refills    amiodarone (PACERONE) 100 mg tablet 90 Tab 3     Sig: take 1 tablet by mouth once daily

## 2021-01-08 RX ORDER — AMIODARONE HYDROCHLORIDE 100 MG/1
TABLET ORAL
Qty: 90 TAB | Refills: 3 | Status: SHIPPED | OUTPATIENT
Start: 2021-01-08

## 2021-04-27 ENCOUNTER — OFFICE VISIT (OUTPATIENT)
Dept: FAMILY MEDICINE CLINIC | Age: 86
End: 2021-04-27
Payer: COMMERCIAL

## 2021-04-27 VITALS
HEIGHT: 71 IN | BODY MASS INDEX: 24.84 KG/M2 | TEMPERATURE: 98.6 F | SYSTOLIC BLOOD PRESSURE: 165 MMHG | OXYGEN SATURATION: 94 % | WEIGHT: 177.4 LBS | HEART RATE: 89 BPM | RESPIRATION RATE: 18 BRPM | DIASTOLIC BLOOD PRESSURE: 92 MMHG

## 2021-04-27 DIAGNOSIS — J44.9 CHRONIC OBSTRUCTIVE PULMONARY DISEASE, UNSPECIFIED COPD TYPE (HCC): ICD-10-CM

## 2021-04-27 DIAGNOSIS — I48.0 PAF (PAROXYSMAL ATRIAL FIBRILLATION) (HCC): ICD-10-CM

## 2021-04-27 DIAGNOSIS — R54 FRAILTY: ICD-10-CM

## 2021-04-27 DIAGNOSIS — R35.0 BENIGN PROSTATIC HYPERPLASIA WITH URINARY FREQUENCY: ICD-10-CM

## 2021-04-27 DIAGNOSIS — J44.1 COPD EXACERBATION (HCC): Primary | ICD-10-CM

## 2021-04-27 DIAGNOSIS — N18.32 STAGE 3B CHRONIC KIDNEY DISEASE (HCC): ICD-10-CM

## 2021-04-27 DIAGNOSIS — I48.91 ATRIAL FIBRILLATION WITH RVR (HCC): ICD-10-CM

## 2021-04-27 DIAGNOSIS — N40.1 BENIGN PROSTATIC HYPERPLASIA WITH URINARY FREQUENCY: ICD-10-CM

## 2021-04-27 DIAGNOSIS — N32.81 OVERACTIVE BLADDER: ICD-10-CM

## 2021-04-27 DIAGNOSIS — E03.9 HYPOTHYROIDISM, UNSPECIFIED TYPE: ICD-10-CM

## 2021-04-27 PROCEDURE — 99214 OFFICE O/P EST MOD 30 MIN: CPT | Performed by: NURSE PRACTITIONER

## 2021-04-27 RX ORDER — ALBUTEROL SULFATE 90 UG/1
AEROSOL, METERED RESPIRATORY (INHALATION)
Qty: 1 INHALER | Refills: 3 | Status: SHIPPED | OUTPATIENT
Start: 2021-04-27 | End: 2021-08-30 | Stop reason: SDUPTHER

## 2021-04-27 RX ORDER — LEVOTHYROXINE SODIUM 50 UG/1
TABLET ORAL
Qty: 90 TAB | Refills: 1 | Status: SHIPPED | OUTPATIENT
Start: 2021-04-27 | End: 2021-08-30 | Stop reason: SDUPTHER

## 2021-04-27 RX ORDER — TAMSULOSIN HYDROCHLORIDE 0.4 MG/1
CAPSULE ORAL
Qty: 90 CAP | Refills: 1 | Status: CANCELLED | OUTPATIENT
Start: 2021-04-27

## 2021-04-27 RX ORDER — AMIODARONE HYDROCHLORIDE 100 MG/1
TABLET ORAL
Qty: 90 TAB | Refills: 3 | Status: CANCELLED | OUTPATIENT
Start: 2021-04-27

## 2021-04-27 RX ORDER — DOXYCYCLINE 100 MG/1
100 CAPSULE ORAL 2 TIMES DAILY
Qty: 10 CAP | Refills: 0 | Status: SHIPPED | OUTPATIENT
Start: 2021-04-27 | End: 2021-05-02

## 2021-04-27 RX ORDER — PREDNISONE 10 MG/1
30 TABLET ORAL DAILY
Qty: 30 TAB | Refills: 0 | Status: SHIPPED | OUTPATIENT
Start: 2021-04-27 | End: 2022-01-01 | Stop reason: SDUPTHER

## 2021-04-27 NOTE — PROGRESS NOTES
HISTORY OF PRESENT ILLNESS  Bienvenido Sam is a 80 y.o. male seen to establish care and cough  HPI  Previous patient of Dr. Portia Schofield. Patient is establishing care with me today. Patient has a history of hypertension, atrial fib, BPH, COPD, IBS and recurrent deep vein thrombosis. Patient was brought here today by a friend. Patient is congested and wheezing. Per patient onset \"a few days. \"  Patient had a breathing treatment today with ipratropium and albuterol about 1 pm at home. I ordered home health to evaluate patient for safety, COPD regimen and vitals. Ordered chest xray. Unknown if patient is positive for covid. Patient reports he lives down. He has a daughter that lives in the area. Patient reports he had a little bit of neck pain when he awakens. Patient's neck and shoulders range of motion was normal.  Advised patient to try a new pillow that is contoured and advertised to help with neck pain. Patient reports he has not been able to get his pulmonologist on the phone. Ordered pulmonology referral referral to Dr. Alanna Barthel. Patient reports he did not have the covid vaccine yet. Advised patient if he wants to get the Covid vaccine he could try [facility] or they are giving them at [facility] at the [facility] no appointment necessary. Patient's Eliquis discontinued October 2020 by Dr. Portia Schofield. Patient reports his appetite is good for his age, he has no urinary issues and regular bowel movements. Patient denies fever, chills, chest pain, abdomen pain or dark tarry stool. Advised patient it is imperative that he get his prednisone and antibiotics soon. Advised patient he should hear from home health. I am concerned patient is no longer able to take care of himself and he lives alone. Spoke with home health and they will try to get rehab specialist Ms. Sukh Connell to check on patient today April 28, 2021. Spoke with patient's daughter Hawa Vivas today, April 28, 2021. She reports that patient goes into the hospital he feels good after treatment and then goes home in back slides again and does not take care of himself. She says her dad has been adamant about staying in the home. She indicated she would  his prednisone and antibiotics today. She reported that if he appeared to be worse today that she would take him to the emergency room. I advised Mr. Deidre Loco I ordered home health and they were going to check on him today. Review of Systems   Constitutional: Positive for malaise/fatigue. Negative for chills, fever and weight loss. HENT: Positive for congestion. Negative for ear pain, nosebleeds, sinus pain, sore throat and tinnitus. Eyes: Negative. Cardiovascular: Negative for chest pain, palpitations and leg swelling. Gastrointestinal: Negative. Genitourinary: Negative. Musculoskeletal: Positive for neck pain. Negative for back pain, falls, joint pain and myalgias. Patient report neck, maybe sleep related    Neurological: Negative. Endo/Heme/Allergies: Negative. Psychiatric/Behavioral: Negative for depression. The patient is not nervous/anxious. Visit Vitals  BP (!) 165/92 (BP 1 Location: Right arm, BP Patient Position: Sitting, BP Cuff Size: Adult)   Pulse 89   Temp 98.6 °F (37 °C) (Temporal)   Resp 18   Ht 5' 11\" (1.803 m)   Wt 177 lb 6.4 oz (80.5 kg)   SpO2 94%   BMI 24.74 kg/m²     Physical Exam  Vitals signs and nursing note reviewed. Constitutional:       General: He is not in acute distress. Appearance: Normal appearance. He is well-developed and underweight. He is ill-appearing. Comments: Patient's clothes appeared dirty and he was slightly disheveled. Patient appears frail. HENT:      Head: Normocephalic and atraumatic. Right Ear: External ear normal.      Left Ear: External ear normal.      Nose: Congestion present. No rhinorrhea.       Mouth/Throat:      Mouth: Mucous membranes are moist.      Pharynx: Oropharynx is clear. No oropharyngeal exudate or posterior oropharyngeal erythema. Eyes:      General:         Right eye: No discharge. Left eye: No discharge. Conjunctiva/sclera: Conjunctivae normal.      Pupils: Pupils are equal, round, and reactive to light. Neck:      Musculoskeletal: Normal range of motion. No muscular tenderness. Vascular: No carotid bruit. Cardiovascular:      Rate and Rhythm: Normal rate and regular rhythm. Heart sounds: Normal heart sounds. No murmur. No friction rub. No gallop. Pulmonary:      Effort: Pulmonary effort is normal. No respiratory distress. Breath sounds: No stridor. Wheezing and rhonchi present. No rales. Chest:      Chest wall: No tenderness. Abdominal:      General: Bowel sounds are normal.      Palpations: Abdomen is soft. Tenderness: There is no abdominal tenderness. Musculoskeletal: Normal range of motion. Right lower leg: No edema. Left lower leg: No edema. Lymphadenopathy:      Cervical: No cervical adenopathy. Skin:     General: Skin is warm and dry. Coloration: Skin is not jaundiced or pale. Neurological:      Mental Status: He is alert and oriented to person, place, and time. Psychiatric:         Mood and Affect: Mood normal.         Behavior: Behavior normal. Behavior is cooperative. Thought Content:  Thought content normal.         Judgment: Judgment normal.       Past Medical History:   Diagnosis Date    Atrial fibrillation (HCC)     BPH (benign prostatic hyperplasia)     Chronic obstructive pulmonary disease (HCC)     Hyperlipemia     Hypertension     Hypothyroidism     IBS (irritable bowel syndrome)     Macrocytic anemia     Recurrent deep vein thrombosis (DVT) (HCC)     Thromboembolus (HCC)     lower extremity     Patient Active Problem List   Diagnosis Code    History of DVT (deep vein thrombosis) Z86.718    Essential hypertension I10    Hypothyroidism E03.9    Long term current use of anticoagulants with INR goal of 2.0-3.0 Z79.01    Hyperlipidemia E78.5    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 30 Tab 3    aspirin 81 mg chewable tablet Take 1 Tab by mouth daily. 90 Tab 1    acetaminophen (TYLENOL) 500 mg tablet Take 2 Tabs by mouth every six (6) hours as needed for Pain. 90 Tab 1    fluticasone-salmeterol (ADVAIR) 500-50 mcg/dose diskus inhaler Take 1 Puff by inhalation every twelve (12) hours. 60 Each 3     No current facility-administered medications on file prior to visit. ASSESSMENT and PLAN    ICD-10-CM ICD-9-CM    1. COPD exacerbation ([facility])  J44.1 491.21 HEMOGLOBIN A1C WITH EAG      TSH 3RD GENERATION      T4, FREE      CBC WITH AUTOMATED DIFF      URINALYSIS W/ RFLX MICROSCOPIC      predniSONE (DELTASONE) 10 mg tablet      XR CHEST PA LAT      doxycycline (MONODOX) 100 mg capsule      REFERRAL TO HOME HEALTH   2. Chronic obstructive pulmonary disease, unspecified COPD type ([facility])  J44.9 496 tiotropium bromide (SPIRIVA RESPIMAT) 2.5 mcg/actuation inhaler      albuterol (PROVENTIL HFA, VENTOLIN HFA, PROAIR HFA) 90 mcg/actuation inhaler      REFERRAL TO PULMONARY DISEASE   3. PAF (paroxysmal atrial fibrillation) ([facility])  I48.0 427.31    4. Atrial fibrillation with RVR ([facility])  I48.91 427.31    5. Overactive bladder  N32.81 596.51 mirabegron ER (Myrbetriq) 25 mg ER tablet   6.  Benign prostatic hyperplasia with urinary frequency  N40.1 600.01     R35.0 788.41    7. Hypothyroidism, unspecified type  E03.9 244.9 levothyroxine (SYNTHROID) 50 mcg tablet   8. Frailty  R54 [id number]   9. Stage 3b chronic kidney disease ([facility] [address]  N18.32 585.3 REFERRAL TO NEPHROLOGY       50% of 30 minutes spent on counseling and managing of care. Advise patient if condition worsens or no improvement to respond to emergency room.

## 2021-04-27 NOTE — PROGRESS NOTES
Luanneiglesia Moore presents today for   Chief Complaint   Patient presents with    New Patient    Shortness of Breath    Wheezing       Luanne Moore preferred language for health care discussion is english/other. Personal Protective Equipment:   Personal Protective Equipment was used including: mask-surgical and hands-gloves. Patient was placed on no precaution(s). Patient was masked. Precautions:   Patient currently on None  Patient currently roomed with door closed    Is someone accompanying this pt? No    Is the patient using any DME equipment during OV? Yes; Walker    Depression Screening:  3 most recent PHQ Screens 4/27/2021   Little interest or pleasure in doing things Not at all   Feeling down, depressed, irritable, or hopeless Not at all   Total Score PHQ 2 0       Learning Assessment:  Learning Assessment 3/28/2018   PRIMARY LEARNER Patient   HIGHEST LEVEL OF EDUCATION - PRIMARY LEARNER  DID NOT GRADUATE [facility] PRIMARY LEARNER NONE   CO-LEARNER CAREGIVER No   PRIMARY LANGUAGE ENGLISH   LEARNER PREFERENCE PRIMARY READING   ANSWERED BY Patient   RELATIONSHIP SELF       Abuse Screening:  Abuse Screening Questionnaire 3/28/2018   Do you ever feel afraid of your partner? N   Are you in a relationship with someone who physically or mentally threatens you? N   Is it safe for you to go home? Y       Fall Risk  Fall Risk Assessment, last 12 mths 4/27/2021   Able to walk? Yes   Fall in past 12 months? 0   Do you feel unsteady? 0   Are you worried about falling 0       Pt currently taking Anticoagulant therapy? No    Coordination of Care:  1. Have you been to the ER, urgent care clinic since your last visit? Hospitalized since your last visit? No    2. Have you seen or consulted any other health care providers outside of the [address] [place] [address] since your last visit? Include any pap smears or colon screening.  No

## 2021-04-28 ENCOUNTER — DOCUMENTATION ONLY (OUTPATIENT)
Dept: FAMILY MEDICINE CLINIC | Age: 86
End: 2021-04-28

## 2021-04-28 ENCOUNTER — HOME HEALTH ADMISSION (OUTPATIENT)
Dept: HOME HEALTH SERVICES | Facility: HOME HEALTH | Age: 86
End: 2021-04-28
Payer: MEDICARE

## 2021-04-28 NOTE — PROGRESS NOTES
Spoke with patient this morning. He reports that his daughter will  his prednisone and antibiotics today. He reports, Zunilda Capps feels fair. \" Advised patient that I ordered home health. Advised patient to get medications as soon as possible. Advised patient if he feels worse and/or his breathing gets worse to go to the emergency room.

## 2021-04-30 DIAGNOSIS — R60.0 BILATERAL LOWER EXTREMITY EDEMA: ICD-10-CM

## 2021-04-30 DIAGNOSIS — I10 ESSENTIAL HYPERTENSION: ICD-10-CM

## 2021-04-30 DIAGNOSIS — N40.1 BENIGN PROSTATIC HYPERPLASIA WITH URINARY FREQUENCY: ICD-10-CM

## 2021-04-30 DIAGNOSIS — R35.0 BENIGN PROSTATIC HYPERPLASIA WITH URINARY FREQUENCY: ICD-10-CM

## 2021-04-30 RX ORDER — TAMSULOSIN HYDROCHLORIDE 0.4 MG/1
0.4 CAPSULE ORAL DAILY
Qty: 90 CAP | Refills: 0 | Status: SHIPPED | OUTPATIENT
Start: 2021-04-30 | End: 2021-08-30 | Stop reason: SDUPTHER

## 2021-04-30 NOTE — TELEPHONE ENCOUNTER
PCP: Cherie Melendez NP    Last appt: 4/27/2021  No future appointments. Requested Prescriptions     Pending Prescriptions Disp Refills    tamsulosin (FLOMAX) 0.4 mg capsule 90 Cap 1       Request for a 30 or 90 day supply? Provider Discretion    Pharmacy: Confirmed    Other Comments:  Medication refill request via fax.  Thank you

## 2021-04-30 NOTE — TELEPHONE ENCOUNTER
Patient needs a refill for Furosemide (Lasix) patient daughter also want to know if the doctor still wants him on that medication.   Please assist

## 2021-05-01 ENCOUNTER — HOME CARE VISIT (OUTPATIENT)
Dept: SCHEDULING | Facility: HOME HEALTH | Age: 86
End: 2021-05-01
Payer: MEDICARE

## 2021-05-01 VITALS
OXYGEN SATURATION: 92 % | HEART RATE: 89 BPM | RESPIRATION RATE: 17 BRPM | TEMPERATURE: 97.3 F | DIASTOLIC BLOOD PRESSURE: 70 MMHG | SYSTOLIC BLOOD PRESSURE: 120 MMHG

## 2021-05-01 PROCEDURE — 400018 HH-NO PAY CLAIM PROCEDURE

## 2021-05-01 PROCEDURE — 3331090001 HH PPS REVENUE CREDIT

## 2021-05-01 PROCEDURE — G0151 HHCP-SERV OF PT,EA 15 MIN: HCPCS

## 2021-05-01 PROCEDURE — 3331090002 HH PPS REVENUE DEBIT

## 2021-05-01 PROCEDURE — G0299 HHS/HOSPICE OF RN EA 15 MIN: HCPCS

## 2021-05-01 PROCEDURE — 400013 HH SOC

## 2021-05-01 NOTE — Clinical Note
I just called his daughter Liz and told her also, I did some [facility] browsing and you can get them for like $10.00, So I told her and she was very happy to pick one up for him.   ----- Message -----  From: Laurence Chaudhry NP  Sent: 5/3/2021  11:48 AM EDT  To: Syed Gill RN      I will try to order him a pulse ox thanks  ----- Message -----  From: Rangel Pham RN  Sent: 5/3/2021   8:36 AM EDT  To: George Gutierrez NP    Patient seen 5/1/ 21 and SOC completed. Patient was only 85%  02 sat on room air. Did not appear sob. He has 02 in the home and I put him on 2.5L and sats increased to 95% with in 1-2 min. I Recommended he get an oximeter to help keep track of his 02. I also recommended he stay on 02 while he recuperates and uses oxmeter to help guide him to know when he needs it. SN/PT both ordered.   Thank you

## 2021-05-02 PROCEDURE — 3331090002 HH PPS REVENUE DEBIT

## 2021-05-02 PROCEDURE — 3331090001 HH PPS REVENUE CREDIT

## 2021-05-03 ENCOUNTER — HOME CARE VISIT (OUTPATIENT)
Dept: HOME HEALTH SERVICES | Facility: HOME HEALTH | Age: 86
End: 2021-05-03
Payer: MEDICARE

## 2021-05-03 ENCOUNTER — DOCUMENTATION ONLY (OUTPATIENT)
Dept: FAMILY MEDICINE CLINIC | Age: 86
End: 2021-05-03

## 2021-05-03 VITALS
TEMPERATURE: 98.7 F | RESPIRATION RATE: 16 BRPM | DIASTOLIC BLOOD PRESSURE: 70 MMHG | OXYGEN SATURATION: 95 % | SYSTOLIC BLOOD PRESSURE: 140 MMHG | HEART RATE: 80 BPM

## 2021-05-03 DIAGNOSIS — J44.9 CHRONIC OBSTRUCTIVE PULMONARY DISEASE, UNSPECIFIED COPD TYPE (HCC): Primary | ICD-10-CM

## 2021-05-03 PROCEDURE — 3331090001 HH PPS REVENUE CREDIT

## 2021-05-03 PROCEDURE — 3331090002 HH PPS REVENUE DEBIT

## 2021-05-03 RX ORDER — SPIRONOLACTONE 25 MG/1
25 TABLET ORAL DAILY
Qty: 30 TAB | Refills: 3 | Status: SHIPPED | OUTPATIENT
Start: 2021-05-03 | End: 2021-08-30 | Stop reason: SDUPTHER

## 2021-05-03 NOTE — CASE COMMUNICATION
Patient seen 5/1/ 21 and SOC completed. Patient was only 85%  02 sat on room air. Did not appear sob. He has 02 in the home and I put him on 2.5L and sats increased to 95% with in 1-2 min. I Recommended he get an oximeter to help keep track of his 02. I also recommended he stay on 02 while he recuperates and uses oxmeter to help guide him to know when he needs it. SN/PT both ordered.   Thank you

## 2021-05-03 NOTE — PROGRESS NOTES
Faxed patient imaging orders for XR Chest to [facility] imaging @ [phone]. Fax confirmation was received and sent to central scanning. Encounter closed.

## 2021-05-03 NOTE — PROGRESS NOTES
Supply Order for the patient Pulse OX has been faxed to [facility] Supply @ [phone]. Fax confirmation was received and sent to central scanning. Encounter closed.

## 2021-05-04 ENCOUNTER — HOME CARE VISIT (OUTPATIENT)
Dept: HOME HEALTH SERVICES | Facility: HOME HEALTH | Age: 86
End: 2021-05-04
Payer: MEDICARE

## 2021-05-04 ENCOUNTER — HOME CARE VISIT (OUTPATIENT)
Dept: SCHEDULING | Facility: HOME HEALTH | Age: 86
End: 2021-05-04
Payer: MEDICARE

## 2021-05-04 VITALS
OXYGEN SATURATION: 92 % | TEMPERATURE: 98.9 F | HEART RATE: 77 BPM | RESPIRATION RATE: 15 BRPM | SYSTOLIC BLOOD PRESSURE: 148 MMHG | DIASTOLIC BLOOD PRESSURE: 80 MMHG

## 2021-05-04 PROCEDURE — 3331090001 HH PPS REVENUE CREDIT

## 2021-05-04 PROCEDURE — 3331090002 HH PPS REVENUE DEBIT

## 2021-05-04 PROCEDURE — G0300 HHS/HOSPICE OF LPN EA 15 MIN: HCPCS

## 2021-05-04 NOTE — PROGRESS NOTES
DME supply order has been faxed to [facility] and fax confirmation has been received. Thank you.

## 2021-05-05 ENCOUNTER — HOME CARE VISIT (OUTPATIENT)
Dept: HOME HEALTH SERVICES | Facility: HOME HEALTH | Age: 86
End: 2021-05-05
Payer: MEDICARE

## 2021-05-05 PROCEDURE — 3331090002 HH PPS REVENUE DEBIT

## 2021-05-05 PROCEDURE — 3331090001 HH PPS REVENUE CREDIT

## 2021-05-06 ENCOUNTER — HOME CARE VISIT (OUTPATIENT)
Dept: SCHEDULING | Facility: HOME HEALTH | Age: 86
End: 2021-05-06
Payer: MEDICARE

## 2021-05-06 VITALS
OXYGEN SATURATION: 94 % | DIASTOLIC BLOOD PRESSURE: 69 MMHG | SYSTOLIC BLOOD PRESSURE: 137 MMHG | RESPIRATION RATE: 16 BRPM | TEMPERATURE: 97.8 F | HEART RATE: 74 BPM

## 2021-05-06 PROCEDURE — 3331090001 HH PPS REVENUE CREDIT

## 2021-05-06 PROCEDURE — G0157 HHC PT ASSISTANT EA 15: HCPCS

## 2021-05-06 PROCEDURE — 3331090002 HH PPS REVENUE DEBIT

## 2021-05-07 ENCOUNTER — HOME CARE VISIT (OUTPATIENT)
Dept: SCHEDULING | Facility: HOME HEALTH | Age: 86
End: 2021-05-07
Payer: MEDICARE

## 2021-05-07 ENCOUNTER — HOME CARE VISIT (OUTPATIENT)
Dept: HOME HEALTH SERVICES | Facility: HOME HEALTH | Age: 86
End: 2021-05-07
Payer: MEDICARE

## 2021-05-07 VITALS
SYSTOLIC BLOOD PRESSURE: 140 MMHG | TEMPERATURE: 97.5 F | OXYGEN SATURATION: 99 % | HEART RATE: 59 BPM | DIASTOLIC BLOOD PRESSURE: 80 MMHG | RESPIRATION RATE: 16 BRPM

## 2021-05-07 VITALS
OXYGEN SATURATION: 90 % | SYSTOLIC BLOOD PRESSURE: 138 MMHG | DIASTOLIC BLOOD PRESSURE: 82 MMHG | RESPIRATION RATE: 16 BRPM | HEART RATE: 64 BPM | TEMPERATURE: 98.3 F

## 2021-05-07 PROCEDURE — 3331090001 HH PPS REVENUE CREDIT

## 2021-05-07 PROCEDURE — G0300 HHS/HOSPICE OF LPN EA 15 MIN: HCPCS

## 2021-05-07 PROCEDURE — G0157 HHC PT ASSISTANT EA 15: HCPCS

## 2021-05-07 PROCEDURE — 3331090002 HH PPS REVENUE DEBIT

## 2021-05-08 PROCEDURE — 3331090002 HH PPS REVENUE DEBIT

## 2021-05-08 PROCEDURE — 3331090001 HH PPS REVENUE CREDIT

## 2021-05-09 PROCEDURE — 3331090001 HH PPS REVENUE CREDIT

## 2021-05-09 PROCEDURE — 3331090002 HH PPS REVENUE DEBIT

## 2021-05-10 PROCEDURE — 3331090002 HH PPS REVENUE DEBIT

## 2021-05-10 PROCEDURE — 3331090001 HH PPS REVENUE CREDIT

## 2021-05-11 ENCOUNTER — HOME CARE VISIT (OUTPATIENT)
Dept: SCHEDULING | Facility: HOME HEALTH | Age: 86
End: 2021-05-11
Payer: MEDICARE

## 2021-05-11 ENCOUNTER — HOME CARE VISIT (OUTPATIENT)
Dept: HOME HEALTH SERVICES | Facility: HOME HEALTH | Age: 86
End: 2021-05-11
Payer: MEDICARE

## 2021-05-11 VITALS
RESPIRATION RATE: 16 BRPM | DIASTOLIC BLOOD PRESSURE: 59 MMHG | HEART RATE: 88 BPM | OXYGEN SATURATION: 96 % | TEMPERATURE: 98.2 F | SYSTOLIC BLOOD PRESSURE: 117 MMHG

## 2021-05-11 VITALS
RESPIRATION RATE: 16 BRPM | OXYGEN SATURATION: 96 % | SYSTOLIC BLOOD PRESSURE: 117 MMHG | HEART RATE: 88 BPM | DIASTOLIC BLOOD PRESSURE: 59 MMHG | TEMPERATURE: 98.2 F

## 2021-05-11 PROCEDURE — 3331090001 HH PPS REVENUE CREDIT

## 2021-05-11 PROCEDURE — G0300 HHS/HOSPICE OF LPN EA 15 MIN: HCPCS

## 2021-05-11 PROCEDURE — G0157 HHC PT ASSISTANT EA 15: HCPCS

## 2021-05-11 PROCEDURE — 3331090002 HH PPS REVENUE DEBIT

## 2021-05-12 PROCEDURE — 3331090001 HH PPS REVENUE CREDIT

## 2021-05-12 PROCEDURE — 3331090002 HH PPS REVENUE DEBIT

## 2021-05-13 ENCOUNTER — HOME CARE VISIT (OUTPATIENT)
Dept: HOME HEALTH SERVICES | Facility: HOME HEALTH | Age: 86
End: 2021-05-13
Payer: MEDICARE

## 2021-05-13 VITALS
SYSTOLIC BLOOD PRESSURE: 152 MMHG | TEMPERATURE: 98.2 F | OXYGEN SATURATION: 97 % | HEART RATE: 82 BPM | DIASTOLIC BLOOD PRESSURE: 78 MMHG

## 2021-05-13 PROCEDURE — 3331090001 HH PPS REVENUE CREDIT

## 2021-05-13 PROCEDURE — G0157 HHC PT ASSISTANT EA 15: HCPCS

## 2021-05-13 PROCEDURE — 3331090002 HH PPS REVENUE DEBIT

## 2021-05-14 ENCOUNTER — HOME CARE VISIT (OUTPATIENT)
Dept: SCHEDULING | Facility: HOME HEALTH | Age: 86
End: 2021-05-14
Payer: MEDICARE

## 2021-05-14 VITALS
OXYGEN SATURATION: 96 % | SYSTOLIC BLOOD PRESSURE: 158 MMHG | TEMPERATURE: 97.8 F | HEART RATE: 62 BPM | DIASTOLIC BLOOD PRESSURE: 81 MMHG | RESPIRATION RATE: 15 BRPM

## 2021-05-14 PROCEDURE — G0300 HHS/HOSPICE OF LPN EA 15 MIN: HCPCS

## 2021-05-14 PROCEDURE — 3331090002 HH PPS REVENUE DEBIT

## 2021-05-14 PROCEDURE — 3331090001 HH PPS REVENUE CREDIT

## 2021-05-15 PROCEDURE — 3331090001 HH PPS REVENUE CREDIT

## 2021-05-15 PROCEDURE — 3331090002 HH PPS REVENUE DEBIT

## 2021-05-16 PROCEDURE — 3331090001 HH PPS REVENUE CREDIT

## 2021-05-16 PROCEDURE — 3331090002 HH PPS REVENUE DEBIT

## 2021-05-17 PROCEDURE — 3331090002 HH PPS REVENUE DEBIT

## 2021-05-17 PROCEDURE — 3331090001 HH PPS REVENUE CREDIT

## 2021-05-18 ENCOUNTER — HOME CARE VISIT (OUTPATIENT)
Dept: SCHEDULING | Facility: HOME HEALTH | Age: 86
End: 2021-05-18
Payer: MEDICARE

## 2021-05-18 ENCOUNTER — TELEPHONE (OUTPATIENT)
Dept: INTERNAL MEDICINE CLINIC | Age: 86
End: 2021-05-18

## 2021-05-18 VITALS
TEMPERATURE: 97.8 F | RESPIRATION RATE: 18 BRPM | DIASTOLIC BLOOD PRESSURE: 75 MMHG | HEART RATE: 78 BPM | OXYGEN SATURATION: 95 % | SYSTOLIC BLOOD PRESSURE: 130 MMHG

## 2021-05-18 VITALS
TEMPERATURE: 98.1 F | SYSTOLIC BLOOD PRESSURE: 134 MMHG | RESPIRATION RATE: 16 BRPM | HEART RATE: 65 BPM | DIASTOLIC BLOOD PRESSURE: 72 MMHG | OXYGEN SATURATION: 96 %

## 2021-05-18 PROCEDURE — G0300 HHS/HOSPICE OF LPN EA 15 MIN: HCPCS

## 2021-05-18 PROCEDURE — 3331090002 HH PPS REVENUE DEBIT

## 2021-05-18 PROCEDURE — G0157 HHC PT ASSISTANT EA 15: HCPCS

## 2021-05-18 PROCEDURE — 3331090001 HH PPS REVENUE CREDIT

## 2021-05-18 NOTE — TELEPHONE ENCOUNTER
[employer] was the only local DME company that was carrying pulse ox ([employer], [employer] and [employer] do not carry pulse ox). Will continue to search.

## 2021-05-18 NOTE — TELEPHONE ENCOUNTER
[facility] calling to say that they are out of pulse oximetry and this order needs to be sent to a different company.

## 2021-05-19 ENCOUNTER — HOME CARE VISIT (OUTPATIENT)
Dept: SCHEDULING | Facility: HOME HEALTH | Age: 86
End: 2021-05-19
Payer: MEDICARE

## 2021-05-19 PROCEDURE — 3331090001 HH PPS REVENUE CREDIT

## 2021-05-19 PROCEDURE — G0151 HHCP-SERV OF PT,EA 15 MIN: HCPCS

## 2021-05-19 PROCEDURE — 3331090002 HH PPS REVENUE DEBIT

## 2021-05-20 PROCEDURE — 3331090002 HH PPS REVENUE DEBIT

## 2021-05-20 PROCEDURE — 3331090001 HH PPS REVENUE CREDIT

## 2021-05-21 ENCOUNTER — HOME CARE VISIT (OUTPATIENT)
Dept: SCHEDULING | Facility: HOME HEALTH | Age: 86
End: 2021-05-21
Payer: MEDICARE

## 2021-05-21 VITALS
DIASTOLIC BLOOD PRESSURE: 85 MMHG | TEMPERATURE: 98.3 F | RESPIRATION RATE: 16 BRPM | SYSTOLIC BLOOD PRESSURE: 141 MMHG | OXYGEN SATURATION: 97 % | HEART RATE: 62 BPM

## 2021-05-21 VITALS
HEART RATE: 71 BPM | OXYGEN SATURATION: 93 % | DIASTOLIC BLOOD PRESSURE: 86 MMHG | SYSTOLIC BLOOD PRESSURE: 148 MMHG | TEMPERATURE: 99.7 F | RESPIRATION RATE: 16 BRPM

## 2021-05-21 PROCEDURE — G0300 HHS/HOSPICE OF LPN EA 15 MIN: HCPCS

## 2021-05-21 PROCEDURE — 3331090002 HH PPS REVENUE DEBIT

## 2021-05-21 PROCEDURE — 3331090001 HH PPS REVENUE CREDIT

## 2021-05-22 PROCEDURE — 3331090001 HH PPS REVENUE CREDIT

## 2021-05-22 PROCEDURE — 3331090002 HH PPS REVENUE DEBIT

## 2021-05-23 PROCEDURE — 3331090001 HH PPS REVENUE CREDIT

## 2021-05-23 PROCEDURE — 3331090002 HH PPS REVENUE DEBIT

## 2021-05-24 ENCOUNTER — HOME CARE VISIT (OUTPATIENT)
Dept: SCHEDULING | Facility: HOME HEALTH | Age: 86
End: 2021-05-24
Payer: MEDICARE

## 2021-05-24 VITALS
OXYGEN SATURATION: 93 % | TEMPERATURE: 98.5 F | RESPIRATION RATE: 16 BRPM | HEART RATE: 66 BPM | SYSTOLIC BLOOD PRESSURE: 139 MMHG | DIASTOLIC BLOOD PRESSURE: 76 MMHG

## 2021-05-24 DIAGNOSIS — I48.91 ATRIAL FIBRILLATION WITH RVR (HCC): ICD-10-CM

## 2021-05-24 PROCEDURE — G0157 HHC PT ASSISTANT EA 15: HCPCS

## 2021-05-24 PROCEDURE — 3331090001 HH PPS REVENUE CREDIT

## 2021-05-24 PROCEDURE — 3331090002 HH PPS REVENUE DEBIT

## 2021-05-24 NOTE — TELEPHONE ENCOUNTER
PCP: Danny Alexander NP    Last appt: 4/27/2021  Future Appointments   Date Time Provider Blossom Lund   5/24/2021 10:00 AM Zoila Sibley, PTA [address] [facility]   5/25/2021 To Be Determined Hermelinda Hands, LPN [facility] [address] [facility]   5/28/2021 To Be Determined Tilly Bring, PT [address] [facility]   5/28/2021 To Be Determined Hermelinda Hands, LPN [address] [facility]   6/1/2021 To Be Determined Hermelinda Hands, LPN [address] [facility]   6/8/2021 To Be Determined Hermelinda Hands, LPN [facility]       Requested Prescriptions     Pending Prescriptions Disp Refills    aspirin 81 mg chewable tablet 90 Tablet 1     Sig: Take 1 Tablet by mouth daily. Request for a 30 or 90 day supply? Provider Discretion    Pharmacy: Confirmed    Other Comments:  Medication refill request via fax.

## 2021-05-25 ENCOUNTER — HOME CARE VISIT (OUTPATIENT)
Dept: SCHEDULING | Facility: HOME HEALTH | Age: 86
End: 2021-05-25
Payer: MEDICARE

## 2021-05-25 VITALS
OXYGEN SATURATION: 94 % | DIASTOLIC BLOOD PRESSURE: 79 MMHG | TEMPERATURE: 97.8 F | HEART RATE: 62 BPM | RESPIRATION RATE: 16 BRPM | SYSTOLIC BLOOD PRESSURE: 133 MMHG

## 2021-05-25 PROCEDURE — G0300 HHS/HOSPICE OF LPN EA 15 MIN: HCPCS

## 2021-05-25 PROCEDURE — 3331090001 HH PPS REVENUE CREDIT

## 2021-05-25 PROCEDURE — 3331090002 HH PPS REVENUE DEBIT

## 2021-05-26 PROCEDURE — 3331090002 HH PPS REVENUE DEBIT

## 2021-05-26 PROCEDURE — 3331090001 HH PPS REVENUE CREDIT

## 2021-05-27 PROCEDURE — 3331090002 HH PPS REVENUE DEBIT

## 2021-05-27 PROCEDURE — 3331090001 HH PPS REVENUE CREDIT

## 2021-05-28 ENCOUNTER — HOME CARE VISIT (OUTPATIENT)
Dept: SCHEDULING | Facility: HOME HEALTH | Age: 86
End: 2021-05-28
Payer: MEDICARE

## 2021-05-28 PROCEDURE — G0300 HHS/HOSPICE OF LPN EA 15 MIN: HCPCS

## 2021-05-28 PROCEDURE — 3331090001 HH PPS REVENUE CREDIT

## 2021-05-28 PROCEDURE — G0151 HHCP-SERV OF PT,EA 15 MIN: HCPCS

## 2021-05-28 PROCEDURE — 3331090002 HH PPS REVENUE DEBIT

## 2021-05-29 VITALS
HEART RATE: 62 BPM | DIASTOLIC BLOOD PRESSURE: 86 MMHG | SYSTOLIC BLOOD PRESSURE: 145 MMHG | OXYGEN SATURATION: 93 % | RESPIRATION RATE: 17 BRPM | TEMPERATURE: 97.8 F

## 2021-05-29 PROCEDURE — 3331090001 HH PPS REVENUE CREDIT

## 2021-05-29 PROCEDURE — 3331090002 HH PPS REVENUE DEBIT

## 2021-05-30 VITALS
TEMPERATURE: 99.2 F | OXYGEN SATURATION: 87 % | DIASTOLIC BLOOD PRESSURE: 54 MMHG | SYSTOLIC BLOOD PRESSURE: 112 MMHG | HEART RATE: 101 BPM

## 2021-05-30 PROCEDURE — 3331090002 HH PPS REVENUE DEBIT

## 2021-05-30 PROCEDURE — 3331090001 HH PPS REVENUE CREDIT

## 2021-05-31 PROCEDURE — 3331090002 HH PPS REVENUE DEBIT

## 2021-05-31 PROCEDURE — 3331090001 HH PPS REVENUE CREDIT

## 2021-05-31 PROCEDURE — 400018 HH-NO PAY CLAIM PROCEDURE

## 2021-06-01 ENCOUNTER — HOME CARE VISIT (OUTPATIENT)
Dept: SCHEDULING | Facility: HOME HEALTH | Age: 86
End: 2021-06-01
Payer: MEDICARE

## 2021-06-01 PROCEDURE — G0299 HHS/HOSPICE OF RN EA 15 MIN: HCPCS

## 2021-06-01 PROCEDURE — 3331090002 HH PPS REVENUE DEBIT

## 2021-06-01 PROCEDURE — 400013 HH SOC

## 2021-06-01 PROCEDURE — 3331090001 HH PPS REVENUE CREDIT

## 2021-06-01 PROCEDURE — 3331090003 HH PPS REVENUE ADJ

## 2021-06-03 VITALS
OXYGEN SATURATION: 96 % | TEMPERATURE: 97.8 F | HEART RATE: 80 BPM | SYSTOLIC BLOOD PRESSURE: 124 MMHG | DIASTOLIC BLOOD PRESSURE: 70 MMHG | RESPIRATION RATE: 18 BRPM

## 2021-06-28 ENCOUNTER — DOCUMENTATION ONLY (OUTPATIENT)
Dept: PULMONOLOGY | Age: 86
End: 2021-06-28

## 2021-06-28 NOTE — PROGRESS NOTES
6/28/21 called pt to [address] [facility] - pt to call back after checking with transportation - prwrk in pending folder

## 2021-07-07 RX ORDER — GUAIFENESIN 100 MG/5ML
81 LIQUID (ML) ORAL DAILY
Qty: 90 TABLET | Refills: 1 | Status: SHIPPED | OUTPATIENT
Start: 2021-07-07 | End: 2022-01-01

## 2021-08-20 ENCOUNTER — VIRTUAL VISIT (OUTPATIENT)
Dept: FAMILY MEDICINE CLINIC | Age: 86
End: 2021-08-20

## 2021-08-20 DIAGNOSIS — Z86.718 HISTORY OF DVT (DEEP VEIN THROMBOSIS): Primary | ICD-10-CM

## 2021-08-20 DIAGNOSIS — I48.91 ATRIAL FIBRILLATION WITH RVR (HCC): ICD-10-CM

## 2021-08-20 NOTE — PROGRESS NOTES
Erroneous encounter. Patient indicated his taxi was late and even not able to get here today. He needs his Eliquis, ordered. He will reschedule next week.

## 2021-08-30 ENCOUNTER — OFFICE VISIT (OUTPATIENT)
Dept: FAMILY MEDICINE CLINIC | Age: 86
End: 2021-08-30
Payer: COMMERCIAL

## 2021-08-30 ENCOUNTER — HOSPITAL ENCOUNTER (OUTPATIENT)
Dept: LAB | Age: 86
Discharge: HOME | End: 2021-08-30
Payer: COMMERCIAL

## 2021-08-30 VITALS
WEIGHT: 177 LBS | HEART RATE: 62 BPM | OXYGEN SATURATION: 97 % | DIASTOLIC BLOOD PRESSURE: 68 MMHG | BODY MASS INDEX: 24.78 KG/M2 | HEIGHT: 71 IN | TEMPERATURE: 97.2 F | SYSTOLIC BLOOD PRESSURE: 126 MMHG | RESPIRATION RATE: 18 BRPM

## 2021-08-30 DIAGNOSIS — N32.81 OVERACTIVE BLADDER: ICD-10-CM

## 2021-08-30 DIAGNOSIS — J44.9 CHRONIC OBSTRUCTIVE PULMONARY DISEASE, UNSPECIFIED COPD TYPE (HCC): ICD-10-CM

## 2021-08-30 DIAGNOSIS — I10 ESSENTIAL HYPERTENSION: ICD-10-CM

## 2021-08-30 DIAGNOSIS — E03.9 HYPOTHYROIDISM, UNSPECIFIED TYPE: ICD-10-CM

## 2021-08-30 DIAGNOSIS — Z86.718 HISTORY OF DVT (DEEP VEIN THROMBOSIS): ICD-10-CM

## 2021-08-30 DIAGNOSIS — R35.0 BENIGN PROSTATIC HYPERPLASIA WITH URINARY FREQUENCY: ICD-10-CM

## 2021-08-30 DIAGNOSIS — Z09 HOSPITAL DISCHARGE FOLLOW-UP: Primary | ICD-10-CM

## 2021-08-30 DIAGNOSIS — R60.0 BILATERAL LOWER EXTREMITY EDEMA: ICD-10-CM

## 2021-08-30 DIAGNOSIS — N40.1 BENIGN PROSTATIC HYPERPLASIA WITH URINARY FREQUENCY: ICD-10-CM

## 2021-08-30 DIAGNOSIS — E78.5 HYPERLIPIDEMIA, UNSPECIFIED HYPERLIPIDEMIA TYPE: ICD-10-CM

## 2021-08-30 DIAGNOSIS — R52 PAIN: ICD-10-CM

## 2021-08-30 DIAGNOSIS — I48.91 ATRIAL FIBRILLATION WITH RVR (HCC): ICD-10-CM

## 2021-08-30 LAB
CHOLEST SERPL-MCNC: 232 MG/DL
HDLC SERPL-MCNC: 53 MG/DL (ref 40–60)
HDLC SERPL: 4.4 {RATIO} (ref 0–5)
LDLC SERPL CALC-MCNC: 159.6 MG/DL (ref 0–100)
LIPID PROFILE,FLP: ABNORMAL
T4 FREE SERPL-MCNC: 1.2 NG/DL (ref 0.7–1.5)
TRIGL SERPL-MCNC: 97 MG/DL (ref ?–150)
TSH SERPL DL<=0.05 MIU/L-ACNC: 2.73 UIU/ML (ref 0.36–3.74)
VLDLC SERPL CALC-MCNC: 19.4 MG/DL

## 2021-08-30 PROCEDURE — 99214 OFFICE O/P EST MOD 30 MIN: CPT | Performed by: NURSE PRACTITIONER

## 2021-08-30 PROCEDURE — 80061 LIPID PANEL: CPT

## 2021-08-30 PROCEDURE — 84439 ASSAY OF FREE THYROXINE: CPT

## 2021-08-30 PROCEDURE — 84443 ASSAY THYROID STIM HORMONE: CPT

## 2021-08-30 PROCEDURE — 36415 COLL VENOUS BLD VENIPUNCTURE: CPT

## 2021-08-30 RX ORDER — ACETAMINOPHEN 500 MG
1000 TABLET ORAL
Qty: 90 TABLET | Refills: 1 | Status: SHIPPED | OUTPATIENT
Start: 2021-08-30 | End: 2022-01-01

## 2021-08-30 RX ORDER — ALBUTEROL SULFATE 90 UG/1
AEROSOL, METERED RESPIRATORY (INHALATION)
Qty: 1 INHALER | Refills: 3 | Status: SHIPPED | OUTPATIENT
Start: 2021-08-30

## 2021-08-30 RX ORDER — SPIRONOLACTONE 25 MG/1
25 TABLET ORAL DAILY
Qty: 30 TABLET | Refills: 3 | Status: SHIPPED | OUTPATIENT
Start: 2021-08-30 | End: 2022-01-01

## 2021-08-30 RX ORDER — FLUTICASONE PROPIONATE AND SALMETEROL 500; 50 UG/1; UG/1
1 POWDER RESPIRATORY (INHALATION) EVERY 12 HOURS
Qty: 60 EACH | Refills: 3 | Status: SHIPPED | OUTPATIENT
Start: 2021-08-30 | End: 2022-01-01

## 2021-08-30 RX ORDER — LEVOTHYROXINE SODIUM 50 UG/1
TABLET ORAL
Qty: 90 TABLET | Refills: 1 | Status: SHIPPED | OUTPATIENT
Start: 2021-08-30 | End: 2022-01-01 | Stop reason: SDUPTHER

## 2021-08-30 RX ORDER — IPRATROPIUM BROMIDE AND ALBUTEROL SULFATE 2.5; .5 MG/3ML; MG/3ML
SOLUTION RESPIRATORY (INHALATION)
Qty: 30 NEBULE | Refills: 0 | Status: SHIPPED | OUTPATIENT
Start: 2021-08-30 | End: 2021-01-01 | Stop reason: SDUPTHER

## 2021-08-30 RX ORDER — TAMSULOSIN HYDROCHLORIDE 0.4 MG/1
0.4 CAPSULE ORAL DAILY
Qty: 90 CAPSULE | Refills: 0 | Status: SHIPPED | OUTPATIENT
Start: 2021-08-30 | End: 2021-01-01 | Stop reason: SDUPTHER

## 2021-08-30 NOTE — PROGRESS NOTES
Kamlesh Quinteros presents today for   Chief Complaint   Patient presents with    ED Follow-up    Fall    Neck Pain    Back Pain    Leg Pain     Bilateral leg pain       Kamlesh Quinteros preferred language for health care discussion is english/other. Personal Protective Equipment:   Personal Protective Equipment was used including: mask-surgical and hands-gloves. Patient was placed on no precaution(s). Patient was masked. Precautions:   Patient currently on None  Patient currently roomed with door closed    Is someone accompanying this pt? No    Is the patient using any DME equipment during OV? Yes; Walker    Depression Screening:  3 most recent PHQ Screens 4/27/2021   Little interest or pleasure in doing things Not at all   Feeling down, depressed, irritable, or hopeless Not at all   Total Score PHQ 2 0       Learning Assessment:  Learning Assessment 3/28/2018   PRIMARY LEARNER Patient   HIGHEST LEVEL OF EDUCATION - PRIMARY LEARNER  DID NOT GRADUATE [facility] PRIMARY LEARNER NONE   CO-LEARNER CAREGIVER No   PRIMARY LANGUAGE ENGLISH   LEARNER PREFERENCE PRIMARY READING   ANSWERED BY Patient   RELATIONSHIP SELF       Abuse Screening:  Abuse Screening Questionnaire 3/28/2018   Do you ever feel afraid of your partner? N   Are you in a relationship with someone who physically or mentally threatens you? N   Is it safe for you to go home? Y       Fall Risk  Fall Risk Assessment, last 12 mths 8/30/2021   Able to walk? Yes   Fall in past 12 months? 1   Do you feel unsteady? 1   Are you worried about falling 1   Is TUG test greater than 12 seconds? 0   Is the gait abnormal? 1   Number of falls in past 12 months 2   Fall with injury? 1       Pt currently taking Anticoagulant therapy? No    Coordination of Care:  1. Have you been to the ER, urgent care clinic since your last visit? [facility] with injury    2.  Have you seen or consulted any other health care providers outside of the [address] [place] [address] since your last visit? Include any pap smears or colon screening.  No

## 2021-08-30 NOTE — PROGRESS NOTES
OFFICE NOTE    Eloise Hanley is a 80 y.o. male presenting today for office visit. 8/30/2021  8:16 AM    Chief Complaint   Patient presents with    ED Follow-up    Fall    Neck Pain    Back Pain    Leg Pain     Bilateral leg pain       HPI:   In office related to ED visit August 10, 2021 related to fall. He says he did not hurt himself. He needs a new A/C window unit. No recent falls. 7/4/2021 A1C 5.9. He goes to a kidney doctor. He sees a pulmonologist but missed last appointment. He wants to know if we can fill something out that could help him pay for eliquis. Patient reports appetite is good, no urinary issues, sleeps well and regular bowel movements. Patient denies fever, chills, chest pain, shortness of breath, abdomen pain or dark tarry stool. Covid vaccine, completed     ROS:    · General: negative for - chills, fever, weight changes or malaise  · HEENT: no sore throat, nasal congestion, vision problems or ear problems  · Respiratory: no cough, shortness of breath, or wheezing  · Cardiovascular: no chest pain, palpitations, or dyspnea on exertion  · Gastrointestinal: no abdominal pain, N/V, change in bowel habits, or black or bloody stools  · Musculoskeletal: no back pain, joint pain, joint stiffness, muscle pain or muscle weakness  · Neurological: no numbness, tingling, headache or dizziness  · Endo:  No polyuria or polydipsia. · : no hematuria, dysuria, frequency, hesitancy, or nocturia.     · Skin: No itching or rash, no open skin, no unusual lesions   · Psychological: negative for - anxiety, depression, sleep disturbances, suicidal or homicidal ideations     PHQ Screening   3 most recent PHQ Screens 8/30/2021   Little interest or pleasure in doing things Not at all   Feeling down, depressed, irritable, or hopeless Not at all   Total Score PHQ 2 0       History  Past Medical History:   Diagnosis Date    Atrial fibrillation (HCC)     BPH (benign prostatic hyperplasia)     Chronic obstructive pulmonary disease (HCC)     Hyperlipemia     Hypertension     Hypothyroidism     IBS (irritable bowel syndrome)     Macrocytic anemia     Recurrent deep vein thrombosis (DVT) (HCC)     Thromboembolus (HCC)     lower extremity       No past surgical history on file. Social History     Socioeconomic History    Marital status:      Spouse name: Not on file    Number of children: Not on file    Years of education: Not on file    Highest education level: Not on file   Occupational History    Not on file   Tobacco Use    Smoking status: Former Smoker    Smokeless tobacco: Never Used   Substance and Sexual Activity    Alcohol use: No    Drug use: No    Sexual activity: Not on file   Other Topics Concern    Not on file   Social History Narrative    Not on file     Social Determinants of Health     Financial Resource Strain:     Difficulty of Paying Living Expenses:    Food Insecurity:     Worried About [address] in the Last Year:     [address] in the Last Year:    Transportation Needs:     Lack of Transportation (Medical):  Lack of Transportation (Non-Medical):    Physical Activity:     Days of Exercise per Week:     Minutes of Exercise per Session:    Stress:     Feeling of Stress :    Social Connections:     Frequency of Communication with Friends and Family:     Frequency of Social Gatherings with Friends and Family:     Attends Bahai Services:     Active Member of Clubs or Organizations:     Attends Club or Organization Meetings:     Marital Status:    Intimate Partner Violence:     Fear of Current or Ex-Partner:     Emotionally Abused:     Physically Abused:     Sexually Abused:        No Known Allergies    Current Outpatient Medications   Medication Sig Dispense Refill    apixaban (ELIQUIS) 2.5 mg tablet Take 1 Tablet by mouth two (2) times a day.  60 Tablet 1    spironolactone (ALDACTONE) 25 mg tablet Take 1 Tablet by mouth daily. 30 Tablet 3    tamsulosin (FLOMAX) 0.4 mg capsule Take 1 Capsule by mouth daily. 90 Capsule 0    mirabegron ER (Myrbetriq) 25 mg ER tablet GIVE 1 TABLET BY MOUTH ONCE EVERY OTHER DAY FOR OVERACTIVE BLADDER 90 Tablet 1    tiotropium bromide (SPIRIVA RESPIMAT) 2.5 mcg/actuation inhaler Take 2 Puffs by inhalation daily. 1 Inhaler 1    albuterol (PROVENTIL HFA, VENTOLIN HFA, PROAIR HFA) 90 mcg/actuation inhaler GIVE 2 PUFFS BY MOUTH EVERY 4 HOURS AS NEEDED FOR COPD 1 Inhaler 3    levothyroxine (SYNTHROID) 50 mcg tablet GIVE 1 TABLET BY MOUTH ONCE A DAY FOR THYROID 90 Tablet 1    albuterol-ipratropium (DUO-NEB) 2.5 mg-0.5 mg/3 ml nebu inhale contents of 1 vial ( 3 milliliters ) in nebulizer by mouth and INTO THE LUNGS every 4 hours if needed for WHEEZING,SHORTNESS OF BREATH, AND/OR COUGH 30 Nebule 0    fluticasone propion-salmeteroL (ADVAIR/WIXELA) 500-50 mcg/dose diskus inhaler Take 1 Puff by inhalation every twelve (12) hours. 60 Each 3    acetaminophen (TYLENOL) 500 mg tablet Take 2 Tablets by mouth every six (6) hours as needed for Pain. 90 Tablet 1    aspirin 81 mg chewable tablet Take 1 Tablet by mouth daily. 90 Tablet 1    OXYGEN-AIR DELIVERY SYSTEMS 2.5 L by IntraNASal route as needed for Other (sob).  amiodarone (PACERONE) 100 mg tablet take 1 tablet by mouth once daily 90 Tab 3       Patient Care Team:  Patient Care Team:  Nicolasa Quinteros NP as PCP - General (Nurse Practitioner)  Nicolasa Quinteros NP as PCP - [facility]    LABS:  None new to review    RADIOLOGY:  None new to review    Physical Exam  Patient appears well, is pleasant, alert, oriented x 3, in no distress. ENT normal.  Neck supple. No adenopathy or thyromegaly. ALKA. Lungs are clear, good air entry, no wheezes, rhonchi or rales. Cardiovascular, S1 and S2 normal, no murmurs, regular rate and rhythm. No LAD.   Chest wall negative for tenderness  Abdomen is soft without tenderness, guarding, mass or organomegaly. /Anorectal, deferred. Muscleskeletal, no swelling, no tenderness, no injury. Extremities show no edema, normal peripheral pulses. Neurological is normal without focal findings. Skin: no concerning lesions. Psych: normal affect. Mood good. Oriented x 3. Judgement and insight intact. Vitals:    08/30/21 1503   BP: (!) 151/80   Pulse: 74   Resp: 18   Temp: 97.2 °F (36.2 °C)   TempSrc: Temporal   SpO2: 97%   Weight: 177 lb (80.3 kg)   Height: 5' 11\" (1.803 m)   PainSc:   5   PainLoc: Neck       Assessment and Plan    Diagnoses and all orders for this visit:    Hospital discharge follow-up    Essential hypertension  -     spironolactone (ALDACTONE) 25 mg tablet; Take 1 Tablet by mouth daily. , Normal, Disp-30 Tablet, R-3    Chronic obstructive pulmonary disease, unspecified COPD type (HCC)  -     tiotropium bromide (SPIRIVA RESPIMAT) 2.5 mcg/actuation inhaler; Take 2 Puffs by inhalation daily. , Normal, Disp-1 Inhaler, R-1  -     albuterol (PROVENTIL HFA, VENTOLIN HFA, PROAIR HFA) 90 mcg/actuation inhaler; GIVE 2 PUFFS BY MOUTH EVERY 4 HOURS AS NEEDED FOR COPD, Normal, Disp-1 Inhaler, R-3  -     albuterol-ipratropium (DUO-NEB) 2.5 mg-0.5 mg/3 ml nebu; inhale contents of 1 vial ( 3 milliliters ) in nebulizer by mouth and INTO THE LUNGS every 4 hours if needed for WHEEZING,SHORTNESS OF BREATH, AND/OR COUGH, Normal, Disp-30 Nebule, R-0  -     fluticasone propion-salmeteroL (ADVAIR/WIXELA) 500-50 mcg/dose diskus inhaler; Take 1 Puff by inhalation every twelve (12) hours. , Normal, Disp-60 Each, R-3    Hypothyroidism, unspecified type  -     TSH 3RD GENERATION; Future  -     T4, FREE; Future  -     levothyroxine (SYNTHROID) 50 mcg tablet; GIVE 1 TABLET BY MOUTH ONCE A DAY FOR THYROID, Normal, Disp-90 Tablet, R-1    History of DVT (deep vein thrombosis)  -     apixaban (ELIQUIS) 2.5 mg tablet;  Take 1 Tablet by mouth two (2) times a day., Normal, Disp-60 Tablet, R-1    Atrial fibrillation with RVR (HCC)  -     apixaban (ELIQUIS) 2.5 mg tablet; Take 1 Tablet by mouth two (2) times a day., Normal, Disp-60 Tablet, R-1    Bilateral lower extremity edema  -     spironolactone (ALDACTONE) 25 mg tablet; Take 1 Tablet by mouth daily. , Normal, Disp-30 Tablet, R-3    Benign prostatic hyperplasia with urinary frequency  -     tamsulosin (FLOMAX) 0.4 mg capsule; Take 1 Capsule by mouth daily. , Normal, Disp-90 Capsule, R-0    Overactive bladder  -     mirabegron ER (Myrbetriq) 25 mg ER tablet; GIVE 1 TABLET BY MOUTH ONCE EVERY OTHER DAY FOR OVERACTIVE BLADDER, Normal, Disp-90 Tablet, R-1    Pain  -     acetaminophen (TYLENOL) 500 mg tablet; Take 2 Tablets by mouth every six (6) hours as needed for Pain., Normal, Disp-90 Tablet, R-1    Hyperlipidemia, unspecified hyperlipidemia type  -     LIPID PANEL; Future         *Plan of care reviewed with patient. Patient in agreement with plan and expresses understanding. All questions answered and patient encouraged to call or RTO if further questions or concerns. 50% of 30 minutes spent on counseling and managing care. Follow-up and Dispositions    · Return in about 1 month (around 9/30/2021), or if symptoms worsen or fail to improve, for return 1 mos for copd and medication management.        LESLIE BrightC

## 2021-09-14 NOTE — PROGRESS NOTES
Your TSH and T4 were normal related to your thyroid. Your cholesterol was up at 232 and your LDL was high at 159.6. Your good cholesterol was 53 and heart protective.

## 2021-09-20 NOTE — TELEPHONE ENCOUNTER
PCP: Shay Mcclain NP    Last appt: 8/30/2021  Future Appointments   Date Time Provider Blossom Lund   9/29/2021  1:30 PM Shay Mcclain NP [facility]B       Requested Prescriptions     Pending Prescriptions Disp Refills    albuterol-ipratropium (DUO-NEB) 2.5 mg-0.5 mg/3 ml nebu 30 Nebule 0     Sig: inhale contents of 1 vial ( 3 milliliters ) in nebulizer by mouth and INTO THE LUNGS every 4 hours if needed for WHEEZING,SHORTNESS OF BREATH, AND/OR COUGH       Request for a 30 or 90 day supply? Provider Discretion    Pharmacy: Confirmed    Other Comments:  Medication refill request via fax. Thank you.

## 2021-09-28 NOTE — PROGRESS NOTES
Spoke with [address] Yee, advised patient should have refills of spironolactone. She is seeing him this afternoon and will call me. Patient needs a follow-up appointment, I do not see him on the schedule.

## 2021-09-29 PROBLEM — I50.33 ACUTE ON CHRONIC DIASTOLIC CONGESTIVE HEART FAILURE (HCC): Status: ACTIVE | Noted: 2020-09-21

## 2021-09-29 PROBLEM — B44.81 ABPA (ALLERGIC BRONCHOPULMONARY ASPERGILLOSIS) (HCC): Status: ACTIVE | Noted: 2021-01-01

## 2021-10-21 NOTE — TELEPHONE ENCOUNTER
Domingo Sprague for [facility] called on behalf of the pt. She stated that the pt has gained weight 184. 6 pt has had 2 episodes of SOB pt has adema in both legs and has had recommendation to go ER. Domingo Sprague would like to know what are some recommendations for his medication. Pt has  2 orders that they need speech and medical social worker.  Please advise

## 2021-10-21 NOTE — TELEPHONE ENCOUNTER
Shital for [facility] called on behalf of the pt. She stated that the pt has gained weight 184.6 pt has had 2 episodes of SOB pt has adema in both legs and has had recommendation to go ER. Shital would like to know what are some recommendations for his medication. Pt has  2 orders that they need speech and medical social worker. Please advise

## 2021-10-25 NOTE — PROGRESS NOTES
I left physical therapist Brennan a message related to patient. She had requested patient get speech therapy and medical social worker evaluation. I need to fax number for [facility] home health services.

## 2021-10-26 NOTE — TELEPHONE ENCOUNTER
Chandu Zepeda with [facility] called back stating that SYDNI Grissom was looking for a fax number.      She gave the fax number of [phone] to the attention of yellow team

## 2021-10-26 NOTE — TELEPHONE ENCOUNTER
Shital avila called back stating that SYDNI Lau was looking for a fax number.     She gave the fax number of [phone] to the attention of yellow team

## 2021-11-04 NOTE — TELEPHONE ENCOUNTER
Madelin was returning phone call to [facility] in regards to an order that was sent. Please advise

## 2022-01-01 ENCOUNTER — OFFICE VISIT (OUTPATIENT)
Dept: FAMILY MEDICINE CLINIC | Age: 87
End: 2022-01-01
Payer: COMMERCIAL

## 2022-01-01 ENCOUNTER — TELEPHONE (OUTPATIENT)
Dept: FAMILY MEDICINE CLINIC | Age: 87
End: 2022-01-01

## 2022-01-01 ENCOUNTER — HOSPITAL ENCOUNTER (OUTPATIENT)
Dept: LAB | Age: 87
Discharge: HOME | End: 2022-04-13
Payer: COMMERCIAL

## 2022-01-01 ENCOUNTER — DOCUMENTATION ONLY (OUTPATIENT)
Dept: FAMILY MEDICINE CLINIC | Age: 87
End: 2022-01-01

## 2022-01-01 ENCOUNTER — OFFICE VISIT (OUTPATIENT)
Dept: CARDIOLOGY CLINIC | Age: 87
End: 2022-01-01
Payer: COMMERCIAL

## 2022-01-01 ENCOUNTER — TELEPHONE (OUTPATIENT)
Dept: CARDIOLOGY CLINIC | Age: 87
End: 2022-01-01

## 2022-01-01 VITALS
SYSTOLIC BLOOD PRESSURE: 147 MMHG | HEART RATE: 76 BPM | HEIGHT: 71 IN | BODY MASS INDEX: 27.58 KG/M2 | DIASTOLIC BLOOD PRESSURE: 93 MMHG | OXYGEN SATURATION: 94 % | WEIGHT: 197 LBS | TEMPERATURE: 97.8 F | RESPIRATION RATE: 16 BRPM

## 2022-01-01 VITALS
DIASTOLIC BLOOD PRESSURE: 92 MMHG | BODY MASS INDEX: 26.78 KG/M2 | WEIGHT: 192 LBS | RESPIRATION RATE: 18 BRPM | OXYGEN SATURATION: 95 % | SYSTOLIC BLOOD PRESSURE: 195 MMHG | HEART RATE: 79 BPM | TEMPERATURE: 97.2 F

## 2022-01-01 VITALS
WEIGHT: 193 LBS | RESPIRATION RATE: 16 BRPM | OXYGEN SATURATION: 98 % | HEIGHT: 71 IN | TEMPERATURE: 98.2 F | HEART RATE: 60 BPM | BODY MASS INDEX: 27.02 KG/M2 | DIASTOLIC BLOOD PRESSURE: 76 MMHG | SYSTOLIC BLOOD PRESSURE: 134 MMHG

## 2022-01-01 VITALS
TEMPERATURE: 98.2 F | OXYGEN SATURATION: 99 % | HEART RATE: 101 BPM | SYSTOLIC BLOOD PRESSURE: 131 MMHG | DIASTOLIC BLOOD PRESSURE: 80 MMHG | HEIGHT: 71 IN | WEIGHT: 172.8 LBS | BODY MASS INDEX: 24.19 KG/M2 | RESPIRATION RATE: 16 BRPM

## 2022-01-01 VITALS
HEIGHT: 71 IN | RESPIRATION RATE: 16 BRPM | BODY MASS INDEX: 27.02 KG/M2 | WEIGHT: 193 LBS | TEMPERATURE: 97.9 F | SYSTOLIC BLOOD PRESSURE: 132 MMHG | DIASTOLIC BLOOD PRESSURE: 73 MMHG | OXYGEN SATURATION: 94 % | HEART RATE: 83 BPM

## 2022-01-01 DIAGNOSIS — R54 ADVANCED AGE: ICD-10-CM

## 2022-01-01 DIAGNOSIS — N32.81 OVERACTIVE BLADDER: ICD-10-CM

## 2022-01-01 DIAGNOSIS — I50.33 ACUTE ON CHRONIC DIASTOLIC CONGESTIVE HEART FAILURE (HCC): ICD-10-CM

## 2022-01-01 DIAGNOSIS — Z63.9 FAMILY DYNAMICS PROBLEM: ICD-10-CM

## 2022-01-01 DIAGNOSIS — E03.9 HYPOTHYROIDISM, UNSPECIFIED TYPE: ICD-10-CM

## 2022-01-01 DIAGNOSIS — R60.0 BILATERAL LOWER EXTREMITY EDEMA: ICD-10-CM

## 2022-01-01 DIAGNOSIS — Z09 HOSPITAL DISCHARGE FOLLOW-UP: Primary | ICD-10-CM

## 2022-01-01 DIAGNOSIS — J44.9 CHRONIC OBSTRUCTIVE PULMONARY DISEASE, UNSPECIFIED COPD TYPE (HCC): ICD-10-CM

## 2022-01-01 DIAGNOSIS — I50.9 CONGESTIVE HEART FAILURE, UNSPECIFIED HF CHRONICITY, UNSPECIFIED HEART FAILURE TYPE (HCC): ICD-10-CM

## 2022-01-01 DIAGNOSIS — Z86.73 HISTORY OF CVA (CEREBROVASCULAR ACCIDENT): Primary | ICD-10-CM

## 2022-01-01 DIAGNOSIS — R05.8 SPUTUM PRODUCTION: ICD-10-CM

## 2022-01-01 DIAGNOSIS — K52.9 CHRONIC DIARRHEA: ICD-10-CM

## 2022-01-01 DIAGNOSIS — I48.91 ATRIAL FIBRILLATION WITH RVR (HCC): ICD-10-CM

## 2022-01-01 DIAGNOSIS — R25.2 CRAMPING OF HANDS: ICD-10-CM

## 2022-01-01 DIAGNOSIS — R05.9 COUGH: Primary | ICD-10-CM

## 2022-01-01 DIAGNOSIS — Z86.718 HISTORY OF DVT (DEEP VEIN THROMBOSIS): ICD-10-CM

## 2022-01-01 DIAGNOSIS — N18.32 STAGE 3B CHRONIC KIDNEY DISEASE (HCC): ICD-10-CM

## 2022-01-01 DIAGNOSIS — I10 ESSENTIAL HYPERTENSION: ICD-10-CM

## 2022-01-01 DIAGNOSIS — I10 ESSENTIAL HYPERTENSION: Primary | ICD-10-CM

## 2022-01-01 DIAGNOSIS — R53.83 FATIGUE, UNSPECIFIED TYPE: ICD-10-CM

## 2022-01-01 DIAGNOSIS — Z09 HOSPITAL DISCHARGE FOLLOW-UP: ICD-10-CM

## 2022-01-01 DIAGNOSIS — I48.91 ATRIAL FIBRILLATION WITH RVR (HCC): Primary | ICD-10-CM

## 2022-01-01 DIAGNOSIS — R30.0 DYSURIA: ICD-10-CM

## 2022-01-01 DIAGNOSIS — L97.811 VENOUS STASIS ULCER OF OTHER PART OF RIGHT LOWER LEG LIMITED TO BREAKDOWN OF SKIN WITHOUT VARICOSE VEINS (HCC): ICD-10-CM

## 2022-01-01 DIAGNOSIS — I87.2 VENOUS STASIS ULCER OF OTHER PART OF RIGHT LOWER LEG LIMITED TO BREAKDOWN OF SKIN WITHOUT VARICOSE VEINS (HCC): ICD-10-CM

## 2022-01-01 DIAGNOSIS — I50.33 ACUTE ON CHRONIC DIASTOLIC CONGESTIVE HEART FAILURE (HCC): Primary | ICD-10-CM

## 2022-01-01 DIAGNOSIS — N18.4 STAGE 4 CHRONIC KIDNEY DISEASE (HCC): ICD-10-CM

## 2022-01-01 DIAGNOSIS — J44.1 COPD EXACERBATION (HCC): ICD-10-CM

## 2022-01-01 DIAGNOSIS — R22.0 MASS OF CHIN: ICD-10-CM

## 2022-01-01 LAB
ALBUMIN SERPL-MCNC: 3.3 G/DL (ref 3.4–5)
ALBUMIN/GLOB SERPL: 1.1 {RATIO} (ref 0.8–1.7)
ALP SERPL-CCNC: 48 U/L (ref 45–117)
ALT SERPL-CCNC: 29 U/L (ref 16–61)
ANION GAP SERPL CALC-SCNC: 4 MMOL/L (ref 3–18)
AST SERPL-CCNC: 25 U/L (ref 10–38)
BASOPHILS # BLD: 0 K/UL (ref 0–0.1)
BASOPHILS NFR BLD: 0 % (ref 0–2)
BILIRUB SERPL-MCNC: 0.5 MG/DL (ref 0.2–1)
BILIRUB UR QL STRIP: ABNORMAL
BUN SERPL-MCNC: 40 MG/DL (ref 7–18)
BUN/CREAT SERPL: 20 (ref 12–20)
CALCIUM SERPL-MCNC: 8.6 MG/DL (ref 8.5–10.1)
CHLORIDE SERPL-SCNC: 109 MMOL/L (ref 100–111)
CO2 SERPL-SCNC: 32 MMOL/L (ref 21–32)
CREAT SERPL-MCNC: 2.01 MG/DL (ref 0.6–1.3)
DIFFERENTIAL METHOD BLD: ABNORMAL
EOSINOPHIL # BLD: 0.1 K/UL (ref 0–0.4)
EOSINOPHIL NFR BLD: 1 % (ref 0–5)
ERYTHROCYTE [DISTWIDTH] IN BLOOD BY AUTOMATED COUNT: 14.6 % (ref 11.6–14.5)
GLOBULIN SER CALC-MCNC: 3.1 G/DL (ref 2–4)
GLUCOSE SERPL-MCNC: 81 MG/DL (ref 74–99)
GLUCOSE UR-MCNC: NEGATIVE MG/DL
HCT VFR BLD AUTO: 31.7 % (ref 36–48)
HGB BLD-MCNC: 10 G/DL (ref 13–16)
IMM GRANULOCYTES # BLD AUTO: 0 K/UL (ref 0–0.04)
IMM GRANULOCYTES NFR BLD AUTO: 0 % (ref 0–0.5)
KETONES P FAST UR STRIP-MCNC: ABNORMAL MG/DL
LYMPHOCYTES # BLD: 1 K/UL (ref 0.9–3.6)
LYMPHOCYTES NFR BLD: 22 % (ref 21–52)
MCH RBC QN AUTO: 35 PG (ref 24–34)
MCHC RBC AUTO-ENTMCNC: 31.5 G/DL (ref 31–37)
MCV RBC AUTO: 110.8 FL (ref 78–100)
MONOCYTES # BLD: 0.8 K/UL (ref 0.05–1.2)
MONOCYTES NFR BLD: 17 % (ref 3–10)
NEUTS SEG # BLD: 2.8 K/UL (ref 1.8–8)
NEUTS SEG NFR BLD: 59 % (ref 40–73)
NRBC # BLD: 0 K/UL (ref 0–0.01)
NRBC BLD-RTO: 0 PER 100 WBC
PH UR STRIP: 5 [PH] (ref 4.6–8)
PLATELET # BLD AUTO: 107 K/UL (ref 135–420)
PMV BLD AUTO: 11.9 FL (ref 9.2–11.8)
POTASSIUM SERPL-SCNC: 4.4 MMOL/L (ref 3.5–5.5)
PROT SERPL-MCNC: 6.4 G/DL (ref 6.4–8.2)
PROT UR QL STRIP: ABNORMAL
RBC # BLD AUTO: 2.86 M/UL (ref 4.35–5.65)
SODIUM SERPL-SCNC: 145 MMOL/L (ref 136–145)
SP GR UR STRIP: 1.02 (ref 1–1.03)
T4 FREE SERPL-MCNC: 1.1 NG/DL (ref 0.7–1.5)
TSH SERPL DL<=0.05 MIU/L-ACNC: 2.03 UIU/ML (ref 0.36–3.74)
UA UROBILINOGEN AMB POC: ABNORMAL (ref 0.2–1)
URINALYSIS CLARITY POC: CLEAR
URINALYSIS COLOR POC: YELLOW
URINE BLOOD POC: NEGATIVE
URINE LEUKOCYTES POC: NEGATIVE
URINE NITRITES POC: NEGATIVE
WBC # BLD AUTO: 4.7 K/UL (ref 4.6–13.2)

## 2022-01-01 PROCEDURE — 93000 ELECTROCARDIOGRAM COMPLETE: CPT | Performed by: INTERNAL MEDICINE

## 2022-01-01 PROCEDURE — 84443 ASSAY THYROID STIM HORMONE: CPT

## 2022-01-01 PROCEDURE — 81001 URINALYSIS AUTO W/SCOPE: CPT | Performed by: NURSE PRACTITIONER

## 2022-01-01 PROCEDURE — 1123F ACP DISCUSS/DSCN MKR DOCD: CPT | Performed by: FAMILY MEDICINE

## 2022-01-01 PROCEDURE — 85025 COMPLETE CBC W/AUTO DIFF WBC: CPT

## 2022-01-01 PROCEDURE — 84439 ASSAY OF FREE THYROXINE: CPT

## 2022-01-01 PROCEDURE — 99214 OFFICE O/P EST MOD 30 MIN: CPT | Performed by: FAMILY MEDICINE

## 2022-01-01 PROCEDURE — 99213 OFFICE O/P EST LOW 20 MIN: CPT | Performed by: NURSE PRACTITIONER

## 2022-01-01 PROCEDURE — 99214 OFFICE O/P EST MOD 30 MIN: CPT | Performed by: NURSE PRACTITIONER

## 2022-01-01 PROCEDURE — 1111F DSCHRG MED/CURRENT MED MERGE: CPT | Performed by: FAMILY MEDICINE

## 2022-01-01 PROCEDURE — 99214 OFFICE O/P EST MOD 30 MIN: CPT | Performed by: INTERNAL MEDICINE

## 2022-01-01 PROCEDURE — 80053 COMPREHEN METABOLIC PANEL: CPT

## 2022-01-01 PROCEDURE — 36415 COLL VENOUS BLD VENIPUNCTURE: CPT

## 2022-01-01 RX ORDER — BUMETANIDE 0.5 MG/1
0.5 TABLET ORAL 2 TIMES DAILY
Qty: 180 TABLET | Refills: 1
Start: 2022-01-01 | End: 2022-01-01

## 2022-01-01 RX ORDER — BUMETANIDE 0.5 MG/1
0.5 TABLET ORAL 2 TIMES DAILY
Qty: 180 TABLET | Refills: 1 | Status: SHIPPED | OUTPATIENT
Start: 2022-01-01 | End: 2022-01-01 | Stop reason: SDUPTHER

## 2022-01-01 RX ORDER — BUMETANIDE 0.5 MG/1
0.5 TABLET ORAL 2 TIMES DAILY
Qty: 180 TABLET | Refills: 1 | OUTPATIENT
Start: 2022-01-01

## 2022-01-01 RX ORDER — DIPHENOXYLATE HYDROCHLORIDE AND ATROPINE SULFATE 2.5; .025 MG/1; MG/1
1 TABLET ORAL
Qty: 30 TABLET | Refills: 1 | Status: SHIPPED | OUTPATIENT
Start: 2022-01-01

## 2022-01-01 RX ORDER — BUMETANIDE 0.5 MG/1
0.5 TABLET ORAL 2 TIMES DAILY
Qty: 180 TABLET | Refills: 1 | Status: SHIPPED | OUTPATIENT
Start: 2022-01-01 | End: 2022-01-01

## 2022-01-01 RX ORDER — LEVOTHYROXINE SODIUM 50 UG/1
TABLET ORAL
Qty: 90 TABLET | Refills: 1 | Status: SHIPPED | OUTPATIENT
Start: 2022-01-01

## 2022-01-01 RX ORDER — BUMETANIDE 0.5 MG/1
0.5 TABLET ORAL 2 TIMES DAILY
Qty: 180 TABLET | Refills: 1 | Status: SHIPPED | OUTPATIENT
Start: 2022-01-01

## 2022-01-01 RX ORDER — BUMETANIDE 0.5 MG/1
0.5 TABLET ORAL DAILY
Qty: 90 TABLET | Refills: 3 | Status: SHIPPED | OUTPATIENT
Start: 2022-01-01 | End: 2022-01-01

## 2022-01-01 RX ORDER — PREDNISONE 10 MG/1
30 TABLET ORAL DAILY
Qty: 30 TABLET | Refills: 0 | Status: SHIPPED | OUTPATIENT
Start: 2022-01-01 | End: 2022-01-01

## 2022-01-01 RX ORDER — GUAIFENESIN 600 MG/1
600 TABLET, EXTENDED RELEASE ORAL 2 TIMES DAILY
Qty: 14 TABLET | Refills: 0 | Status: SHIPPED | OUTPATIENT
Start: 2022-01-01 | End: 2022-01-01

## 2022-01-01 SDOH — SOCIAL STABILITY - SOCIAL INSECURITY: PROBLEM RELATED TO PRIMARY SUPPORT GROUP, UNSPECIFIED: Z63.9

## 2022-01-20 NOTE — PROGRESS NOTES
Oneil Ortiz is a 80 y.o. male (: 1933) presenting to address:    Chief Complaint   Patient presents with    Follow-up     c/o cramping in fingers, leg, neck, and hip pain loose bowels 3-4 days 1 week ago dysuria and needa home health aide and bp cuff       There were no vitals filed for this visit. Hearing/Vision:   No exam data present    Learning Assessment:     Learning Assessment 3/28/2018   PRIMARY LEARNER Patient   HIGHEST LEVEL OF EDUCATION - PRIMARY LEARNER  DID NOT GRADUATE HIGH SCHOOL   BARRIERS PRIMARY LEARNER NONE   CO-LEARNER CAREGIVER No   PRIMARY LANGUAGE ENGLISH   LEARNER PREFERENCE PRIMARY READING   ANSWERED BY Patient   RELATIONSHIP SELF     Depression Screening:     3 most recent PHQ Screens 2022   Little interest or pleasure in doing things Not at all   Feeling down, depressed, irritable, or hopeless Not at all   Total Score PHQ 2 0     Fall Risk Assessment:     Fall Risk Assessment, last 12 mths 2022   Able to walk? Yes   Fall in past 12 months? 0   Do you feel unsteady? 0   Are you worried about falling 0   Is TUG test greater than 12 seconds? -   Is the gait abnormal? -   Number of falls in past 12 months -   Fall with injury? -     Abuse Screening:     Abuse Screening Questionnaire 3/28/2018   Do you ever feel afraid of your partner? N   Are you in a relationship with someone who physically or mentally threatens you? N   Is it safe for you to go home? Y     ADL Assessment:   No flowsheet data found. Coordination of Care Questionaire:   1. \"Have you been to the ER, urgent care clinic since your last visit? Hospitalized since your last visit? \" No    2. \"Have you seen or consulted any other health care providers outside of the [address] [place] [address] since your last visit? \" No     3. For patients aged 39-70: Has the patient had a colonoscopy? NA - based on age     If the patient is female:    4.  For patients aged 41-77: Has the patient had a mammogram within the past 2 years? NA - based on age    11. For patients aged 21-65: Has the patient had a pap smear? NA - based on age    Advanced Directive:   1. Do you have an Advanced Directive? NO    2. Would you like information on Advanced Directives?  YES

## 2022-01-20 NOTE — PROGRESS NOTES
OFFICE NOTE    Gino Atwood is a 80 y.o. male presenting today for office visit. 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 Have not seen patient since August 2021. Patient lives alone and has a daughter that lives nearby. Patient appears well today and in good spirits. Patient says he was in the hospital then he went to rehab and got home 2 weeks ago. He says his daughter dropped him off and she had to go run some errands. Patient is getting home health and home health has requested a blood pressure monitor, ordered. Patient says he cooks for himself but he pretty much is warm step up in the microwave. Home health is sent a handwritten note with patient. Patient reports he has loose stools sometimes. Patient says he seen a kidney doctor \"everything was good. \"  Patient says he has not smoked since 1958. Patient has a history of acute on chronic diastolic congestive heart failure, hypertension, atrial fibrillation with RVR, history of DVT and long-term current use of anticoagulant. Patient on Eliquis 2.5 mg twice daily. Patient agreed to referral to cardiology. Notes indicate patient had hospital admission 11/25/2021-12/3/2021 related to shortness of breath and fall.   Admission diagnoses acute metabolic encephalopathy, severe hypothermia suspected due to sepsis, acute COPD extubation, acute kidney injury, mild rhabdomyolysis, hypokalemia and fall. Discharge diagnoses active Hospital problems altered mental status, hyperkalemia, hypothermia, acute metabolic encephalopathy and acute kidney injury. Patient reports appetite is okay, no urinary issues, sleeps well and regular bowel movements. Patient denies fever, chills, chest pain, shortness of breath, abdomen pain or dark tarry stool. ROS:    · General: negative for - chills, fever, weight changes or malaise  · HEENT: no sore throat, nasal congestion, vision problems or ear problems  · Respiratory: positive for cough at itmes, no shortness of breath or wheezing  · Cardiovascular: no chest pain, palpitations, or dyspnea on exertion  · Gastrointestinal: Intermittent loose stools, no abdominal pain, N/V or black or bloody stools  · Musculoskeletal: Intermittent hand cramping, no back pain, joint pain, joint stiffness, muscle pain or muscle weakness  · Neurological: no numbness, tingling, headache or dizziness  · Endo:  No polyuria or polydipsia. · : no hematuria, dysuria, frequency, hesitancy, or nocturia. · Skin: No itching or rash, no open skin, no unusual lesions   · Psychological: negative for - anxiety, depression, sleep disturbances, suicidal or homicidal ideations     PHQ Screening   3 most recent PHQ Screens 1/20/2022   Little interest or pleasure in doing things Not at all   Feeling down, depressed, irritable, or hopeless Not at all   Total Score PHQ 2 0       History  Past Medical History:   Diagnosis Date    Atrial fibrillation (HCC)     BPH (benign prostatic hyperplasia)     Chronic obstructive pulmonary disease (HCC)     Hyperlipemia     Hypertension     Hypothyroidism     IBS (irritable bowel syndrome)     Macrocytic anemia     Recurrent deep vein thrombosis (DVT) (HCC)     Thromboembolus (HCC)     lower extremity       No past surgical history on file.     Social History     Socioeconomic History    Marital status:      Spouse name: Not on file    Number of children: Not on file    Years of education: Not on file    Highest education level: Not on file   Occupational History    Not on file   Tobacco Use    Smoking status: Former Smoker    Smokeless tobacco: Never Used   Substance and Sexual Activity    Alcohol use: No    Drug use: No    Sexual activity: Not on file   Other Topics Concern    Not on file   Social History Narrative    Not on file     Social Determinants of Health     Financial Resource Strain:     Difficulty of Paying Living Expenses: Not on file   Food Insecurity:     Worried About [address] in the Last Year: Not on file    [address] in the Last Year: Not on file   Transportation Needs:     Lack of Transportation (Medical): Not on file    Lack of Transportation (Non-Medical): Not on file   Physical Activity:     Days of Exercise per Week: Not on file    Minutes of Exercise per Session: Not on file   Stress:     Feeling of Stress : Not on file   Social Connections:     Frequency of Communication with Friends and Family: Not on file    Frequency of Social Gatherings with Friends and Family: Not on file    Attends Mu-ism Services: Not on file    Active Member of [address] [place] [address] or Organizations: Not on file    Attends Club or Organization Meetings: Not on file    Marital Status: Not on file   Intimate Partner Violence:     Fear of Current or Ex-Partner: Not on file    Emotionally Abused: Not on file    Physically Abused: Not on file    Sexually Abused: Not on file   Housing Stability:     Unable to Pay for Housing in the Last Year: Not on file    Number of [place] in the Last Year: Not on file    Unstable Housing in the Last Year: Not on file       No Known Allergies    Current Outpatient Medications   Medication Sig Dispense Refill    tamsulosin (FLOMAX) 0.4 mg capsule Take 1 Capsule by mouth daily.  27 Capsule 0    albuterol-ipratropium (DUO-NEB) 2.5 mg-0.5 mg/3 ml nebu inhale contents of 1 vial ( 3 milliliters ) in nebulizer by mouth and INTO THE LUNGS every 4 hours if needed for WHEEZING,SHORTNESS OF BREATH, AND/OR COUGH 30 Nebule 0    apixaban (ELIQUIS) 2.5 mg tablet Take 1 Tablet by mouth two (2) times a day. 60 Tablet 1    spironolactone (ALDACTONE) 25 mg tablet Take 1 Tablet by mouth daily. 30 Tablet 3    mirabegron ER (Myrbetriq) 25 mg ER tablet GIVE 1 TABLET BY MOUTH ONCE EVERY OTHER DAY FOR OVERACTIVE BLADDER 90 Tablet 1    tiotropium bromide (SPIRIVA RESPIMAT) 2.5 mcg/actuation inhaler Take 2 Puffs by inhalation daily. 1 Inhaler 1    albuterol (PROVENTIL HFA, VENTOLIN HFA, PROAIR HFA) 90 mcg/actuation inhaler GIVE 2 PUFFS BY MOUTH EVERY 4 HOURS AS NEEDED FOR COPD 1 Inhaler 3    levothyroxine (SYNTHROID) 50 mcg tablet GIVE 1 TABLET BY MOUTH ONCE A DAY FOR THYROID 90 Tablet 1    fluticasone propion-salmeteroL (ADVAIR/WIXELA) 500-50 mcg/dose diskus inhaler Take 1 Puff by inhalation every twelve (12) hours. 60 Each 3    acetaminophen (TYLENOL) 500 mg tablet Take 2 Tablets by mouth every six (6) hours as needed for Pain. 90 Tablet 1    aspirin 81 mg chewable tablet Take 1 Tablet by mouth daily. 90 Tablet 1    OXYGEN-AIR DELIVERY SYSTEMS 2.5 L by IntraNASal route as needed for Other (sob).  amiodarone (PACERONE) 100 mg tablet take 1 tablet by mouth once daily 90 Tab 3     Patient Care Team:  Patient Care Team:  Oneyda Negron NP as PCP - General (Nurse Practitioner)  Oneyda Negron NP as PCP - [facility] Empaneled Provider    Physical Exam  Patient appears well, is pleasant, alert, oriented x 3, in no distress. Normal weight. Patient uses a walker. ENT normal.  Neck supple. No adenopathy or thyromegaly. ALKA. Lungs are clear, good air entry, no wheezes, rhonchi or rales. Cardiovascular, S1 and S2 normal, no murmurs, regular rate and rhythm. No LAD.   Chest wall negative for tenderness  Abdomen is soft without tenderness   /Anorectal, deferred. Muscleskeletal, no swelling  Extremities show no edema  Neurological is normal without focal findings. Skin: no concerning lesions. Psych: normal affect. Mood good. Oriented x 3. Judgement and insight intact. Vitals:    01/20/22 1629 01/20/22 1643   BP: (!) 142/82 131/80   Pulse: 93 (!) 101   Resp: 16    Temp: 98.2 °F (36.8 °C)    SpO2: 99%    Weight: 172 lb 12.8 oz (78.4 kg)    Height: 5' 11\" (1.803 m)    PainSc:   0 - No pain        Assessment and Plan    Diagnoses and all orders for this visit:    Hospital discharge follow-up    Cramping of hands  -     METABOLIC PANEL, COMPREHENSIVE; Future    Fatigue, unspecified type  -     CBC WITH AUTOMATED DIFF; Future    Chronic obstructive pulmonary disease, unspecified COPD type ([facility] [address]    Acute on chronic diastolic congestive heart failure (HCC)  -     REFERRAL TO CARDIOLOGY    Atrial fibrillation with RVR (HCC)  -     REFERRAL TO CARDIOLOGY    Stage 3b chronic kidney disease (HCC)    Dysuria  -     AMB POC URINALYSIS DIP STICK AUTO W/ MICRO  -     Cancel: CULTURE, URINE; Future    Essential hypertension  -     AMB SUPPLY ORDER    Hypothyroidism, unspecified type  -     TSH 3RD GENERATION; Future  -     T4, FREE; Future    Mass of chin  -     REFERRAL TO DERMATOLOGY    *Plan of care reviewed with patient. Patient in agreement with plan and expresses understanding. All questions answered and patient encouraged to call or RTO if further questions or concerns. 50% of 30 minutes spent on counseling and managing care.       FANNY Hood

## 2022-02-09 NOTE — PROGRESS NOTES
Spoke to patient by phone. Nurse called about patient's cough and wheezing. Phone number for nurse was wrong. Patient reports he takes his inhalers as directed for his COPD. He sounded well. He says he has a chronic cough with white discharge. Patient denies any chest pain fever shortness of breath. I advised patient is important that he drinks plenty of water to loosen the phlegm that he has with his COPD. Patient agreed.

## 2022-02-09 NOTE — TELEPHONE ENCOUNTER
Pt nurse called stating pt has a productive cough and wheezing in the right lung upper and lower and also pt has x3 edema bilateral lower extremities please advise     Nurse contact [phone]

## 2022-03-10 NOTE — TELEPHONE ENCOUNTER
Patient is requesting a refill for the following prescription. Please advise. Requested Prescriptions     Pending Prescriptions Disp Refills    apixaban (ELIQUIS) 2.5 mg tablet 60 Tablet 1     Sig: Take 1 Tablet by mouth two (2) times a day.

## 2022-03-17 NOTE — PROGRESS NOTES
Identified pt with two pt identifiers(name and ). Reviewed record in preparation for visit and have obtained necessary documentation. Gino Atwood presents today for   Chief Complaint   Patient presents with    New Patient     CHF       Pt c/o  SOB and  SWELLING. Gino Atwood preferred language for health care discussion is english/other. Personal Protective Equipment:   Personal Protective Equipment was used including: mask-surgical and hands-gloves. Patient was placed on no precaution(s). Patient was masked. Precautions:   Patient currently on None  Patient currently roomed with door closed. Is someone accompanying this pt? no    Is the patient using any DME equipment during [address]?  walker    Depression Screening:  3 most recent PHQ Screens 2022   Little interest or pleasure in doing things Not at all   Feeling down, depressed, irritable, or hopeless Not at all   Total Score PHQ 2 0       Learning Assessment:  Learning Assessment 3/28/2018   PRIMARY LEARNER Patient   HIGHEST LEVEL OF EDUCATION - PRIMARY LEARNER  DID NOT GRADUATE [facility] PRIMARY LEARNER NONE   CO-LEARNER CAREGIVER No   PRIMARY LANGUAGE ENGLISH   LEARNER PREFERENCE PRIMARY READING   ANSWERED BY Patient   RELATIONSHIP SELF       Abuse Screening:  Abuse Screening Questionnaire 3/28/2018   Do you ever feel afraid of your partner? N   Are you in a relationship with someone who physically or mentally threatens you? N   Is it safe for you to go home? Y       Fall Risk  Fall Risk Assessment, last 12 mths 2022   Able to walk? Yes   Fall in past 12 months? 0   Do you feel unsteady? 0   Are you worried about falling 0   Is TUG test greater than 12 seconds? -   Is the gait abnormal? -   Number of falls in past 12 months -   Fall with injury? -       Pt currently taking Anticoagulant therapy? yes  Pt currently taking Antiplatelet therapy? no    Coordination of Care:  1.  Have you been to the ER, urgent care clinic since your last visit? Hospitalized since your last visit? no  2. Have you seen or consulted any other health care providers outside of the [address] [place] [address] since your last visit? Include any pap smears or colon screening. no      Please see Red banners under Allergies and Med Rec to remove outside inquires. All correct information has been verified with patient and added to chart.      Medication's patient's would liked removed has been marked not taking to be removed per Verbal order and read back per Thalia Miles MD

## 2022-03-17 NOTE — PROGRESS NOTES
[facility]    Mr. Mariella Ruth is a 80 y.o.male with a history of hypertension, hyperlipidemia, BPH, COPD, DVT, PE status post IVC filter and other multiple medical problem    Patient is here today for initial evaluation. He denies any prior history of myocardial infarction or congestive heart failure. He denies any history of atrial fibrillation as far as he can tell. Patient was admitted with a [facility] with multiple medical problem in August2020. During that hospitalization he was also found to have atrial fibrillation. He tells me that he was taking Eliquis in the past however this was discontinued because of some GI bleed in the past.    Eventually patient was started back on Eliquis. He complains of lower extremity swelling. He does not complain of chest pain or chest tightness. He denies presyncope or syncope. Denies any nausea, vomiting, abdominal pain, fever, chills, sputum production. No hematuria or other bleeding complaints    Past Medical History:   Diagnosis Date    Atrial fibrillation (HCC)     BPH (benign prostatic hyperplasia)     Chronic obstructive pulmonary disease (HCC)     DVT (deep venous thrombosis) (HCC)     Hyperlipemia     Hypertension     Hypothyroidism     IBS (irritable bowel syndrome)     Macrocytic anemia     Pulmonary emboli (HCC)     Recurrent deep vein thrombosis (DVT) (HCC)     Thromboembolus (HCC)     lower extremity       Review of Systems:  Cardiac symptoms as noted above in HPI. All others negative. Current Outpatient Medications   Medication Sig    apixaban (ELIQUIS) 2.5 mg tablet Take 1 Tablet by mouth two (2) times a day.  tamsulosin (FLOMAX) 0.4 mg capsule Take 1 Capsule by mouth daily.     levothyroxine (SYNTHROID) 50 mcg tablet GIVE 1 TABLET BY MOUTH ONCE A DAY FOR THYROID    albuterol-ipratropium (DUO-NEB) 2.5 mg-0.5 mg/3 ml nebu inhale contents of 1 vial ( 3 milliliters ) in nebulizer by mouth and INTO THE LUNGS every 4 hours if needed for WHEEZING,SHORTNESS OF BREATH, AND/OR COUGH    mirabegron ER (Myrbetriq) 25 mg ER tablet GIVE 1 TABLET BY MOUTH ONCE EVERY OTHER DAY FOR OVERACTIVE BLADDER    tiotropium bromide (SPIRIVA RESPIMAT) 2.5 mcg/actuation inhaler Take 2 Puffs by inhalation daily.  albuterol (PROVENTIL HFA, VENTOLIN HFA, PROAIR HFA) 90 mcg/actuation inhaler GIVE 2 PUFFS BY MOUTH EVERY 4 HOURS AS NEEDED FOR COPD    fluticasone propion-salmeteroL (ADVAIR/WIXELA) 500-50 mcg/dose diskus inhaler Take 1 Puff by inhalation every twelve (12) hours.  OXYGEN-AIR DELIVERY SYSTEMS 2.5 L by IntraNASal route as needed for Other (sob).  amiodarone (PACERONE) 100 mg tablet take 1 tablet by mouth once daily (Patient not taking: Reported on 3/17/2022)     No current facility-administered medications for this visit. No past surgical history on file. Allergies and Sensitivities:  No Known Allergies    Family History:  No family history on file. Social History:  Social History     Tobacco Use    Smoking status: Former Smoker    Smokeless tobacco: Never Used   Substance Use Topics    Alcohol use: No    Drug use: No     He  reports that he has quit smoking. He has never used smokeless tobacco.  He  reports no history of alcohol use. Physical Exam:  BP Readings from Last 3 Encounters:   03/17/22 (!) 195/92   01/20/22 131/80   08/30/21 126/68         Pulse Readings from Last 3 Encounters:   03/17/22 79   01/20/22 (!) 101   08/30/21 62          Wt Readings from Last 3 Encounters:   03/17/22 87.1 kg (192 lb)   01/20/22 78.4 kg (172 lb 12.8 oz)   08/30/21 80.3 kg (177 lb)       Constitutional: Oriented to person, place, and time. HENT: Head: Normocephalic and atraumatic. Neck: No JVD present. Cardiovascular: Regular rhythm. No murmur, gallop or rubs appreciated  Lung: Breath sounds normal. No respiratory distress.  No ronchi or rales appreciated  Abdominal: No tenderness. No rebound and no guarding. Musculoskeletal: There is 2+ lower extremity edema. No cynosis  Lymphadenopathy:  No cervical or supraclavicular adenopathy appriciated. Neurological: No gross motor deficit noted. Skin: No visible skin rash noted. LABS:   Lab Results   Component Value Date/Time    Sodium 143 10/29/2020 11:02 AM    Potassium 4.8 10/29/2020 11:02 AM    Chloride 114 (H) 10/29/2020 11:02 AM    CO2 20 (L) 10/29/2020 11:02 AM    Glucose 90 10/29/2020 11:02 AM    BUN 35 (H) 10/29/2020 11:02 AM    Creatinine 2.21 (H) 10/29/2020 11:02 AM     Lipids Latest Ref Rng & Units 8/30/2021 7/23/2019 6/21/2018   Chol, Total <200 MG/(H) 125 138   HDL 40 - 60 MG/DL 53 45 59   LDL 0 - 99 mg/dL - - 75   LDL 0 - 100 MG/. 6(H) 67.2 -   Trig <150 MG/DL 97 64 -   Chol/HDL Ratio 0 - 5.0   4.4 2.8 -   Some recent data might be hidden     Lab Results   Component Value Date/Time    ALT (SGPT) 10 (L) 10/29/2020 11:02 AM     No results found for: HBA1C, XPQ4XQDE, VHT4EHVF, AIY3ZCZG  Lab Results   Component Value Date/Time    TSH 2.73 08/30/2021 03:52 PM       EKG    ECHO (08/20)  MILDLY INCREASED LEFT VENTRICULAR CAVITY SIZE. NORMAL LEFT VENTRICULAR SYSTOLIC FUNCTION WITH AN ESTIMATED EJECTION FRACTION OF 65%. MILD DIASTOLIC DYSFUNCTION. MODERATELY DILATED LEFT ATRIUM. NORMAL RIGHT VENTRICULAR SIZE AND SYSTOLIC FUNCTION VISUALLY. NO HEMODYNAMICALLY SIGNIFICANT VALVULAR PATHOLOGY. NORMAL PULMONARY ARTERY PRESSURE OF 32 MMHG. MODERATE AORTIC ROOT AND THE ASCENDING AORTA DILATATION. TRIVIAL TO SMALL ANTERIOR PERICARDIAL EFFUSION PRESENT. NO EXCESSIVE RESPIRATORY VARIATION PRESENT.   STRESS TEST (EST, PHARM, NUC, ECHO etc)    CATHETERIZATION    IMPRESSION & PLAN:  Mr. Frida Tierney is a 80 y.o. with multiple medical problem    Atrial fibrillation:  Diagnosed in August 2020 during hospitalization in the setting of multiple physiologic stress  EKG in 11/25/20[facility] at [facility] suggested atrial flutter  EKG today suggests sinus rhythm. Patient currently appears to be on Eliquis for stroke prophylaxis. In the past he used to be on amiodarone for rhythm control but he is not on that medication anymore    Diastolic heart failure:  Patient had echocardiogram in August 2020 with normal ejection fraction. Patient has mild diastolic dysfunction. Echocardiogram in 11/2021 showed normal EF. There was no mention of diastolic dysfunction. No major valvular heart disease  Patient has no rales on exam.  He has significant lower extremity swelling. Does not appear to be on diuretics at this time  Discussed salt restriction, leg elevation and compression stockings. Will provide Bumex 0.5 mg daily. BMP to be checked in 2 weeks    History of blood loss anemia:  Patient had GI bleed in August 2020  Underwent EGD in August 2020 which showed mucosal tear in esophagus, nonbleeding duodenal ulcer for which 3 hemoclips were placed. I will defer management to primary team    History of DVT and PE:  Initially patient was on anticoagulation with Eliquis however this was discontinued in August 2020 because of GI bleed and thrombocytopenia. Patient had IVC filter in 2013. Patient currently appears to be taking Eliquis 2.5 mg twice daily. This is being managed by PCP    Patient has advanced age and multiple comorbidities. He tells me that he lives alone. I will reach out to primary care provider to see if he needs to be considered for some sort of home health services or social help. This plan was discussed with patient who is in agreement. Thank you for allowing me to participate in patient care. Please feel free to call me if you have any question or concern. Bonnie Rodriguez MD  Please note: This document has been produced using voice recognition software. Unrecognized errors in transcription may be present.

## 2022-03-17 NOTE — PATIENT INSTRUCTIONS
New Medication/Medication Changes  Bumex 0.5 mg tab daily     **please allow 24-48 hrs for medication to be escribed to pharmacy** If you need any refills on medications please contact your pharmacy so that the request can be escribed to the provider for review.     Labs  BMP in 2 weeks    No appointment required for lab work  [address] [facility] [address] [facility], [address]  Hours are Mon-Fri 7:00 am-3:30 pm  **closed from 12:30 pm-1pm daily**

## 2022-03-20 NOTE — PROGRESS NOTES
Patient put in a request for prednisone. I attempted patient by phone March 20, 2022 to discuss why he needed prednisone. No answer and left a voicemail.

## 2022-03-21 NOTE — TELEPHONE ENCOUNTER
When would you like pt to follow up with you     ----- Message from Silvino Nails sent at 3/14/2022  3:20 PM EDT -----  Subject: Message to Provider    QUESTIONS  Information for Provider? Melissa Nolasco would like to know when Andreas Emanuelkimberly would like to see him again for a follow up. He is scheduled to see   the cardiologist on 3/17/2022. Please reach out to him regarding this.  ---------------------------------------------------------------------------  --------------  CALL BACK INFO  What is the best way for the office to contact you? Do not leave any   message, patient will call back for answer  Preferred Call Back Phone Number? [phone]  ---------------------------------------------------------------------------  --------------  SCRIPT ANSWERS  Relationship to Patient?  Self

## 2022-03-28 NOTE — TELEPHONE ENCOUNTER
Patient would like some clarifications on the medication that Didier prescribed at his last visit.  Bumex prescription

## 2022-03-28 NOTE — TELEPHONE ENCOUNTER
Contacted pt at [facility] number. Two patient Identifiers confirmed. Advised pt per Dr Jay Sam last OV note Bumex 0.5 mg tab daily. Pt verbalized understanding.

## 2022-03-30 NOTE — TELEPHONE ENCOUNTER
Patient is requesting a refill on the following prescription. Patient states he is out.     Requested Prescriptions     Pending Prescriptions Disp Refills    levothyroxine (SYNTHROID) 50 mcg tablet 90 Tablet 1     Sig: GIVE 1 TABLET BY MOUTH ONCE A DAY FOR THYROID

## 2022-04-13 NOTE — PROGRESS NOTES
OFFICE NOTE    Aydee Sheaerr is a 80 y.o. male presenting today for office visit. 4/13/2022  1:41 PM    Chief Complaint   Patient presents with    Follow-up       HPI:   In office visit. Patient says he is fair and he appears well. He says he has a bowel movement 4 times a day. This is bothersome. Patient's home health has ended with [facility]. Patient saw Dr. Henry Pedraza with cardiology. He goes back 6/2022. He was started Bumex 0.5 mg daily. We will increase to 0.5 mg BID. Patient will get his blood work today. Advised patient to take his weight twice weekly at home and wear the same close. Patient and I discussed patient living alone with his advanced age. He and his daughter have talked about it but didn't come to a conclusion. They haven't talked about since. Patient says he cares for himself and his daughter does his grocery shopping     He says his chronic cough is fair. He has not coughed in the exam room. He uses oxygen when he sleeps. He Patient sees a pulmonagologist with [facility], Dr. Jovan Hernandez. Had has had 2 covid vaccines. He needs a booster. Patient reports appetite is good, no urinary issues, sleeps well and regular bowel movements. Patient denies fever, chills, chest pain, shortness of breath, abdomen pain or dark tarry stool. ROS:    · General: negative for - chills, fever, weight changes or malaise  · HEENT: no sore throat, nasal congestion, vision problems or ear problems  · Respiratory: Positive for cough at times cough, negative today for shortness of breath, or wheezing  · Cardiovascular: no chest pain, palpitations, or dyspnea on exertion  · Gastrointestinal: no abdominal pain, N/V, change in bowel habits, or black or bloody stools  · Musculoskeletal: no back pain, joint pain, joint stiffness, muscle pain or muscle weakness  · Neurological: no numbness, tingling, headache or dizziness  · Endo:  No polyuria or polydipsia.    · : no hematuria, dysuria, frequency, hesitancy, or nocturia. · Skin: No itching or rash, no open skin, no unusual lesions   · Psychological: negative for - anxiety, depression, sleep disturbances, suicidal or homicidal ideations     PHQ Screening   3 most recent PHQ Screens 4/21/2022   Little interest or pleasure in doing things Not at all   Feeling down, depressed, irritable, or hopeless Not at all   Total Score PHQ 2 0       History  Past Medical History:   Diagnosis Date    Atrial fibrillation (HCC)     BPH (benign prostatic hyperplasia)     Chronic obstructive pulmonary disease ([facility] [address]     DVT (deep venous thrombosis) (HCC)     Hyperlipemia     Hypertension     Hypothyroidism     IBS (irritable bowel syndrome)     Macrocytic anemia     Pulmonary emboli (HCC)     Recurrent deep vein thrombosis (DVT) ([facility]     Thromboembolus ([facility])     lower extremity       No past surgical history on file. Social History     Socioeconomic History    Marital status:      Spouse name: Not on file    Number of children: Not on file    Years of education: Not on file    Highest education level: Not on file   Occupational History    Not on file   Tobacco Use    Smoking status: Former Smoker    Smokeless tobacco: Never Used   Substance and Sexual Activity    Alcohol use: No    Drug use: No    Sexual activity: Not on file   Other Topics Concern    Not on file   Social History Narrative    Not on file     Social Determinants of Health     Financial Resource Strain:     Difficulty of Paying Living Expenses: Not on file   Food Insecurity:     Worried About [address] in the Last Year: Not on file    [address] in the Last Year: Not on file   Transportation Needs:     Lack of Transportation (Medical): Not on file    Lack of Transportation (Non-Medical):  Not on file   Physical Activity:     Days of Exercise per Week: Not on file    Minutes of Exercise per Session: Not on file   Stress:     Feeling of Stress : Not on file Social Connections:     Frequency of Communication with Friends and Family: Not on file    Frequency of Social Gatherings with Friends and Family: Not on file    Attends Jehovah's witness Services: Not on file    Active Member of Clubs or Organizations: Not on file    Attends Club or Organization Meetings: Not on file    Marital Status: Not on file   Intimate Partner Violence:     Fear of Current or Ex-Partner: Not on file    Emotionally Abused: Not on file    Physically Abused: Not on file    Sexually Abused: Not on file   Housing Stability:     Unable to Pay for Housing in the Last Year: Not on file    Number of [place] in the Last Year: Not on file    Unstable Housing in the Last Year: Not on file       No Known Allergies    Current Outpatient Medications   Medication Sig Dispense Refill    mirabegron ER (Myrbetriq) 25 mg ER tablet GIVE 1 TABLET BY MOUTH ONCE EVERY OTHER DAY FOR OVERACTIVE BLADDER 90 Tablet 1    levothyroxine (SYNTHROID) 50 mcg tablet GIVE 1 TABLET BY MOUTH ONCE A DAY FOR THYROID 90 Tablet 1    apixaban (ELIQUIS) 2.5 mg tablet Take 1 Tablet by mouth two (2) times a day. 60 Tablet 1    tamsulosin (FLOMAX) 0.4 mg capsule Take 1 Capsule by mouth daily. 30 Capsule 0    albuterol-ipratropium (DUO-NEB) 2.5 mg-0.5 mg/3 ml nebu inhale contents of 1 vial ( 3 milliliters ) in nebulizer by mouth and INTO THE LUNGS every 4 hours if needed for WHEEZING,SHORTNESS OF BREATH, AND/OR COUGH 30 Nebule 0    tiotropium bromide (SPIRIVA RESPIMAT) 2.5 mcg/actuation inhaler Take 2 Puffs by inhalation daily. 1 Inhaler 1    albuterol (PROVENTIL HFA, VENTOLIN HFA, PROAIR HFA) 90 mcg/actuation inhaler GIVE 2 PUFFS BY MOUTH EVERY 4 HOURS AS NEEDED FOR COPD 1 Inhaler 3    fluticasone propion-salmeteroL (ADVAIR/WIXELA) 500-50 mcg/dose diskus inhaler Take 1 Puff by inhalation every twelve (12) hours. 60 Each 3    OXYGEN-AIR DELIVERY SYSTEMS 2.5 L by IntraNASal route as needed for Other (sob).       amiodarone (PACERONE) 100 mg tablet take 1 tablet by mouth once daily 90 Tab 3    bumetanide (BUMEX) 0.5 mg tablet Take 1 Tablet by mouth two (2) times a day. Take 2 tabs in the morning and 1 tab in the late afternoon 180 Tablet 1       Patient Care Team:  Patient Care Team:  Garrett Seen, NP as PCP - General (Nurse Practitioner)  Garrett Seen, NP as PCP - [facility] Empaneled Provider    Physical Exam  Patient appears well, is pleasant, alert, oriented x 3, in no distress. ENT normal.  Neck supple. No adenopathy or thyromegaly. ALKA. Lungs are clear, good air entry, no wheezes, rhonchi or rales. Cardiovascular, S1 and S2 normal, no murmurs, regular rate and rhythm. No LAD. Chest wall negative for tenderness  Abdomen is soft without tenderness, guarding, mass or organomegaly. /Anorectal, deferred. Muscleskeletal, no swelling, no tenderness, no injury. Extremities show positive for bilateral edema  Neurological is normal without focal findings. Skin: no concerning lesions. Psych: normal affect. Mood good. Oriented x 3. Judgement and insight intact. Vitals:    04/13/22 1321 04/13/22 1323 04/13/22 1402 04/13/22 1404   BP: (!) 169/105 (!) 166/101 (!) 152/80 (!) 147/93   Pulse: 96  80 76   Resp: 16      Temp: 97.8 °F (36.6 °C)      SpO2: 90%   94%   Weight: 197 lb (89.4 kg)      Height: 5' 11\" (1.803 m)          Assessment and Plan    Diagnoses and all orders for this visit:    Acute on chronic diastolic congestive heart failure (HCC)  -     Discontinue: bumetanide (BUMEX) 0.5 mg tablet; Take 1 Tablet by mouth two (2) times a day., Normal, Disp-180 Tablet, R-1    Bilateral lower extremity edema  -     Discontinue: bumetanide (BUMEX) 0.5 mg tablet;  Take 1 Tablet by mouth two (2) times a day., Normal, Disp-180 Tablet, R-1    Overactive bladder  -     mirabegron ER (Myrbetriq) 25 mg ER tablet; GIVE 1 TABLET BY MOUTH ONCE EVERY OTHER DAY FOR OVERACTIVE BLADDER, Normal, Disp-90 Tablet, R-1    Advanced age    Family dynamics problem         *Plan of care reviewed with patient. Patient in agreement with plan and expresses understanding. All questions answered and patient encouraged to call or RTO if further questions or concerns. 50% of 35 minutes spent on counseling and managing care. Follow-up and Dispositions    · Return in about 1 week (around 4/20/2022) for Lab review and lower extremity swelling related to his chronic heart failure.        LESLIE HernandezC

## 2022-04-13 NOTE — PROGRESS NOTES
Valorie Boles is a 80 y.o. male (: 1933) presenting to address:    Chief Complaint   Patient presents with   [facility][1] Follow Up       Vitals:    22 1321 22 1323   BP: (!) 169/105 (!) 166/101   Pulse: 96    Resp: 16    Temp: 97.8 °F (36.6 °C)    SpO2: 90%    Weight: 197 lb (89.4 kg)    Height: 5' 11\" (1.803 m)        Hearing/Vision:   No exam data present    Learning Assessment:     Learning Assessment 3/28/2018   PRIMARY LEARNER Patient   HIGHEST LEVEL OF EDUCATION - PRIMARY LEARNER  DID NOT GRADUATE HIGH SCHOOL   BARRIERS PRIMARY LEARNER NONE   CO-LEARNER CAREGIVER No   PRIMARY LANGUAGE ENGLISH   LEARNER PREFERENCE PRIMARY READING   ANSWERED BY Patient   RELATIONSHIP SELF     Depression Screening:     3 most recent PHQ Screens 2022   Little interest or pleasure in doing things Not at all   Feeling down, depressed, irritable, or hopeless Not at all   Total Score PHQ 2 0     Fall Risk Assessment:     Fall Risk Assessment, last 12 mths 2022   Able to walk? Yes   Fall in past 12 months? 1   Do you feel unsteady? 1   Are you worried about falling 1   Is TUG test greater than 12 seconds? 1   Is the gait abnormal? 1   Number of falls in past 12 months 2   Fall with injury? 1     Abuse Screening:     Abuse Screening Questionnaire 2022   Do you ever feel afraid of your partner? N   Are you in a relationship with someone who physically or mentally threatens you? N   Is it safe for you to go home? Y     ADL Assessment:   No flowsheet data found. Coordination of Care Questionaire:   1. \"Have you been to the ER, urgent care clinic since your last visit? Hospitalized since your last visit? \" yes    2. \"Have you seen or consulted any other health care providers outside of the [address] [place] [address] since your last visit? \" yes     3. For patients aged 39-70: Has the patient had a colonoscopy? NA - based on age     If the patient is female:    4.  For patients aged 41-77: Has the patient had a mammogram within the past 2 years? NA - based on age    11. For patients aged 21-65: Has the patient had a pap smear? NA - based on age    Advanced Directive:   1. Do you have an Advanced Directive? no    2. Would you like information on Advanced Directives?  NO

## 2022-04-21 NOTE — PROGRESS NOTES
OFFICE NOTE    Rosita Small is a 80 y.o. male presenting today for office visit. 4/21/2022  1:15 PM    Chief Complaint   Patient presents with    Follow-up       HPI:   Patient in office related to lower leg extremity follow-up. Increase Bumex 0.5 mg to twice daily daily. Patient has not had his metabolic panel completed. BP looks good today. Patient says he is taking his COPD medication as directed. Patient c/o loose stools. He had his 1st booster vaccine for covid. Patient reports appetite is good, no urinary issues, sleeps well and regular bowel movements. Patient denies fever, chills, chest pain, shortness of breath, abdomen pain or dark tarry stool. Previous visit 4/13/2022, in office visit. Patient says he is fair and he appears well. He says he has a bowel movement 4 times a day. This is bothersome.      Patient's home health has ended with [facility].      Patient saw Dr. Kristy Santiago with cardiology. He goes back 6/2022. He was started Bumex 0.5 mg daily. We will increase to 0.5 mg BID. Patient will get his blood work today.      Patient and I discussed patient living alone with his advanced age. He and his daughter have talked about it but didn't come to a conclusion. They haven't talked about since. Patient says he cares for himself and his daughter does his grocery shopping      He says his chronic cough is fair. He has not coughed in the exam room. He uses oxygen when he sleeps. He Patient sees a pulmonagologist with [facility], Dr. Hay Harvey.      Had has had 2 covid vaccines.  He needs a booster.      ROS:    · General: negative for - chills, fever, weight changes or malaise  · HEENT: no sore throat, nasal congestion, vision problems or ear problems  · Respiratory: positive for slight expiratory wheezing, says he has a cough times , negative shortness of breath   · Cardiovascular: no chest pain, palpitations, or dyspnea on exertion  · Gastrointestinal: no abdominal pain, N/V, change in bowel habits, or black or bloody stools  · Musculoskeletal: no back pain, joint pain, joint stiffness, muscle pain or muscle weakness  · Neurological: no numbness, tingling, headache or dizziness  · Endo:  No polyuria or polydipsia. · : no hematuria, dysuria, frequency, hesitancy, or nocturia. · Skin: No itching or rash, no open skin, no unusual lesions   · Psychological: negative for - anxiety, depression, sleep disturbances, suicidal or homicidal ideations     PHQ Screening   3 most recent PHQ Screens 4/21/2022   Little interest or pleasure in doing things Not at all   Feeling down, depressed, irritable, or hopeless Not at all   Total Score PHQ 2 0       History  Past Medical History:   Diagnosis Date    Atrial fibrillation (HCC)     BPH (benign prostatic hyperplasia)     [facility] obstructive pulmonary disease [facility]     [facility]     Hyperlipemia     Hypertension     Hypothyroidism     IBS (irritable bowel syndrome)     Macrocytic anemia     Pulmonary emboli (HCC)     [facility]     Thromboembolus ([facility])     lower extremity       No past surgical history on file.     Social History     Socioeconomic History    Marital status:      Spouse name: Not on file    Number of children: Not on file    Years of education: Not on file    Highest education level: Not on file   Occupational History    Not on file   Tobacco Use    Smoking status: Former Smoker    Smokeless tobacco: Never Used   Substance and Sexual Activity    Alcohol use: No    Drug use: No    Sexual activity: Not on file   Other Topics Concern    Not on file   Social History Narrative    Not on file     Social Determinants of Health     Financial Resource Strain:     Difficulty of Paying Living Expenses: Not on file   Food Insecurity:     Worried About [address] in the Last Year: Not on file    Raúl of Food in the Last Year: Not on file Transportation Needs:     Lack of Transportation (Medical): Not on file    Lack of Transportation (Non-Medical): Not on file   Physical Activity:     Days of Exercise per Week: Not on file    Minutes of Exercise per Session: Not on file   Stress:     Feeling of Stress : Not on file   Social Connections:     Frequency of Communication with Friends and Family: Not on file    Frequency of Social Gatherings with Friends and Family: Not on file    Attends Tenriism Services: Not on file    Active Member of [address] [place] [address] or Organizations: Not on file    Attends Club or Organization Meetings: Not on file    Marital Status: Not on file   Intimate Partner Violence:     Fear of Current or Ex-Partner: Not on file    Emotionally Abused: Not on file    Physically Abused: Not on file    Sexually Abused: Not on file   Housing Stability:     Unable to Pay for Housing in the Last Year: Not on file    Number of [place] in the Last Year: Not on file    Unstable Housing in the Last Year: Not on file       No Known Allergies    Current Outpatient Medications   Medication Sig Dispense Refill    bumetanide (BUMEX) 0.5 mg tablet Take 1 Tablet by mouth two (2) times a day. Take 2 tabs in the morning and 1 tab in the late afternoon 180 Tablet 1    mirabegron ER (Myrbetriq) 25 mg ER tablet GIVE 1 TABLET BY MOUTH ONCE EVERY OTHER DAY FOR OVERACTIVE BLADDER 90 Tablet 1    levothyroxine (SYNTHROID) 50 mcg tablet GIVE 1 TABLET BY MOUTH ONCE A DAY FOR THYROID 90 Tablet 1    apixaban (ELIQUIS) 2.5 mg tablet Take 1 Tablet by mouth two (2) times a day. 60 Tablet 1    tamsulosin (FLOMAX) 0.4 mg capsule Take 1 Capsule by mouth daily.  30 Capsule 0    albuterol-ipratropium (DUO-NEB) 2.5 mg-0.5 mg/3 ml nebu inhale contents of 1 vial ( 3 milliliters ) in nebulizer by mouth and INTO THE LUNGS every 4 hours if needed for WHEEZING,SHORTNESS OF BREATH, AND/OR COUGH 30 Nebule 0    tiotropium bromide (SPIRIVA RESPIMAT) 2.5 mcg/actuation inhaler Take 2 Puffs by inhalation daily. 1 Inhaler 1    albuterol (PROVENTIL HFA, VENTOLIN HFA, PROAIR HFA) 90 mcg/actuation inhaler GIVE 2 PUFFS BY MOUTH EVERY 4 HOURS AS NEEDED FOR COPD 1 Inhaler 3    fluticasone propion-salmeteroL (ADVAIR/WIXELA) 500-50 mcg/dose diskus inhaler Take 1 Puff by inhalation every twelve (12) hours. 60 Each 3    OXYGEN-AIR DELIVERY SYSTEMS 2.5 L by IntraNASal route as needed for Other (sob).  amiodarone (PACERONE) 100 mg tablet take 1 tablet by mouth once daily 90 Tab 3     Patient Care Team:  Patient Care Team:  Aleta Francisco NP as PCP - General (Nurse Practitioner)  Aleta Francisco NP as PCP - [address] [place], [place] [address]tung Becerrilled Provider    Physical Exam  Patient appears well, is pleasant, alert, oriented x 3, in no distress. ENT normal.  Neck supple. No adenopathy or thyromegaly. ALKA. Lungs are clear, good air entry, no wheezes, rhonchi or rales. Cardiovascular, S1 and S2 normal, no murmurs, regular rate and rhythm. No LAD. Chest wall negative for tenderness  Abdomen is soft without tenderness  /Anorectal, deferred. Muscleskeletal, no swelling, no tenderness, no injury. Extremities show positive for bilateral edema, normal peripheral pulses. Neurological is normal without focal findings. Skin: no concerning lesions. Psych: normal affect. Mood good. Oriented x 3. Judgement and insight intact. Vitals:    04/21/22 1541 04/21/22 1613   BP: (!) 152/90 132/73   Pulse: 74 83   Resp: 16    Temp: 97.9 °F (36.6 °C)    SpO2: 94%    Weight: 193 lb (87.5 kg)    Height: 5' 11\" (1.803 m)        Assessment and Plan    Diagnoses and all orders for this visit:    Essential hypertension  -     METABOLIC PANEL, COMPREHENSIVE; Future    Bilateral lower extremity edema  -     bumetanide (BUMEX) 0.5 mg tablet; Take 1 Tablet by mouth two (2) times a day.  Take 2 tabs in the morning and 1 tab in the late afternoon, No Print, Disp-180 Tablet, R-1    Acute on chronic diastolic congestive heart failure (HCC)  -     bumetanide (BUMEX) 0.5 mg tablet; Take 1 Tablet by mouth two (2) times a day. Take 2 tabs in the morning and 1 tab in the late afternoon, No Print, Disp-180 Tablet, R-1         *Plan of care reviewed with patient. Patient in agreement with plan and expresses understanding. All questions answered and patient encouraged to call or RTO if further questions or concerns. 50% of 29 minutes spent on counseling and managing care. Follow-up and Dispositions    · Return in about 1 month (around 5/21/2022).        FANNY Zaldivar

## 2022-04-21 NOTE — PROGRESS NOTES
Aydee Shearer is a 80 y.o. male (: 1933) presenting to address:    Chief Complaint   Patient presents with    Follow-up       Vitals:    22 1541   BP: (!) 152/90   Pulse: 74   Resp: 16   Temp: 97.9 °F (36.6 °C)   SpO2: 94%   Weight: 193 lb (87.5 kg)   Height: 5' 11\" (1.803 m)       Hearing/Vision:   No exam data present    Learning Assessment:     Learning Assessment 3/28/2018   PRIMARY LEARNER Patient   HIGHEST LEVEL OF EDUCATION - PRIMARY LEARNER  DID NOT GRADUATE HIGH SCHOOL   BARRIERS PRIMARY LEARNER NONE   CO-LEARNER CAREGIVER No   PRIMARY LANGUAGE ENGLISH   LEARNER PREFERENCE PRIMARY READING   ANSWERED BY Patient   RELATIONSHIP SELF     Depression Screening:     3 most recent PHQ Screens 2022   Little interest or pleasure in doing things Not at all   Feeling down, depressed, irritable, or hopeless Not at all   Total Score PHQ 2 0     Fall Risk Assessment:     Fall Risk Assessment, last 12 mths 2022   Able to walk? Yes   Fall in past 12 months? 0   Do you feel unsteady? 0   Are you worried about falling 0   Is TUG test greater than 12 seconds? -   Is the gait abnormal? -   Number of falls in past 12 months -   Fall with injury? -     Abuse Screening:     Abuse Screening Questionnaire 2022   Do you ever feel afraid of your partner? N   Are you in a relationship with someone who physically or mentally threatens you? N   Is it safe for you to go home? Y     ADL Assessment:   No flowsheet data found. Coordination of Care Questionaire:   1. \"Have you been to the ER, urgent care clinic since your last visit? Hospitalized since your last visit? \" No    2. \"Have you seen or consulted any other health care providers outside of the [address] [place] [address] since your last visit? \" No     3. For patients aged 39-70: Has the patient had a colonoscopy? NA - based on age     If the patient is female:    4. For patients aged 41-77: Has the patient had a mammogram within the past 2 years?  NA - based on age    11. For patients aged 21-65: Has the patient had a pap smear? NA - based on age    Advanced Directive:   1. Do you have an Advanced Directive? no    2. Would you like information on Advanced Directives?  NO

## 2022-04-29 NOTE — TELEPHONE ENCOUNTER
----- Message from Andreas Leblanc NP sent at 4/29/2022  1:02 PM EDT -----  Regarding: CMP  Can you see if you can get a hold patient and asked him to get his complete metabolic panel done. I would like to see it and so does Dr. Henry Pedraza with cardiology. He is on Bumex and I want to see his numbers. He was in office not long ago but the lab was closed.

## 2022-07-07 NOTE — TELEPHONE ENCOUNTER
[facility] is requesting an order for speech therapy. Please call Yvan at [phone]. Thank you!

## 2022-08-16 PROBLEM — N18.30 CHRONIC RENAL DISEASE, STAGE III (HCC): Status: ACTIVE | Noted: 2022-01-01

## 2022-08-16 NOTE — PROGRESS NOTES
Keila Bustos is a 80 y.o.  male and presents with    Chief Complaint   Patient presents with    Hospital Follow Up     7/27/2022 admitted to [facility]  8/10/2022 discharged to rehab      Subjective:  Mr. Tamiko Ramirez is following after hospitalization for CVA as complication of atrial fibrillation  66-year-old male who was last seen in the hospital in the month of May for acute ischemic cerebrovascular accident probably secondary to underlying atrial fibrillation for which she remains on anticoagulation with Eliquis and rate controlled with beta-blocker and amiodarone. That time he was discharged to skilled nursing facility and currently resides by himself at his home where he was noted to sustain a fall on July 27 and reportedly laid on the floor for an unknown period of time. He was brought to the emergency department by the paramedics and was worked up for acute trauma, none was noted but he was noted to be suffering from acute metabolic encephalopathy, hypokalemia and hyponatremia following which she was admitted to the intensive care unit and was treated supportively with IV fluids with gradual correction of her serum sodium and on July 28 was noted to have clinical improvement when hospital medicine service was asked to take over the care of this patient. He passed swallowing evaluation on July 29 and was given a puréed diet with thin liquids but continued to have concern for aspiration pneumonia hence was reevaluated by speech therapist and was then recommended to have a mechanical diet with nectar thick liquids and stays on this diet at the time of discharge. Anticoagulation was discontinued due to declining hemoglobin and concern for recurrent GI bleeding in this elderly patient who has had history of GI bleeding in the past and seems to be a poor candidate for ongoing therapeutic anticoagulation in the long run.  Acute kidney injury was evaluated by the nephrology service and with supportive care his renal dysfunction has improved. He has had chronic anemia with macrocytosis and in the past was noted to have unremarkable B12 and folate levels in 2021, he is also had a bone marrow biopsy completed in 2013 which was suggestive of hypocellular marrow with reduced hematopoiesis, eosinophilia but no dysplasia. 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 Gastroenterology service also evaluated the patient but did not recommend invasive work-up at this time. Due to declining hemoglobin and concern for chronic anemia ? GI bleeding his anticoagulation has been discontinued during this hospitalization. He has multiple medical problems, recurrent hospitalizations, advanced age he was also evaluated by palliative care and has chosen to stay full code  At discharge his eliquis and bumetanide was discontinued. ROS   General: negative for - chills, fever, weight changes or malaise  HEENT: no sore throat, nasal congestion, vision problems or ear problems  Respiratory: positive for slight expiratory wheezing, says he has a cough times , negative shortness of breath   Cardiovascular: no chest pain, palpitations, or dyspnea on exertion  Gastrointestinal: no abdominal pain, N/V, change in bowel habits, or black or bloody stools  Musculoskeletal: no back pain, joint pain, joint stiffness, muscle pain or muscle weakness  Neurological: no numbness, tingling, headache or dizziness  Endo:  No polyuria or polydipsia. : no hematuria, dysuria, frequency, hesitancy, or nocturia.     Skin: No itching or rash, no open skin, no unusual lesions   Psychological: negative for - anxiety, depression, sleep disturbances, suicidal or homicidal ideations        All other systems reviewed and are negative. Objective:  Vitals:    08/16/22 1041   BP: 134/76   Pulse: 60   Resp: 16   Temp: 98.2 °F (36.8 °C)   TempSrc: Temporal   SpO2: 98%   Weight: 193 lb (87.5 kg)   Height: 5' 11\" (1.803 m)   PainSc:   0 - No pain       oriented to person, place, and time, normal appearing weight, and chronically ill appearing  Mental status - normal mood, behavior, speech, dress, motor activity, and thought processes  Chest - + expiratory wheezing  Heart - irregularly irregular rhythm  Neurological - cranial nerves II through XII intact, sitting in wheelchair  Musculoskeletal - no joint tenderness, deformity or swelling  Ext - bilateral 2+ edema    LABS     TESTS      Assessment/Plan:    1. History of CVA (cerebrovascular accident)  Control htn; continue anticoagulation    2. Stage 4 chronic kidney disease ([facility]  Followed by nephrology    3. [facility] fibrillation [facility] R[facility]  Followed by cardiology; continue with anticoagulation    4. Bilateral lower extremity edema  Continue diuretic as prescribed    5. Acute on chronic diastolic congestive heart failure (HCC)  Control volume; continue current treatment     6. Venous stasis ulcer of other part of right lower leg limited to breakdown of skin without varicose veins ([facility])  Wound care     7. Chronic diarrhea  Assess for etiology  - diphenoxylate-atropine (LomotiL) 2.5-0.025 mg per tablet; Take 1 Tablet by mouth four (4) times daily as needed for Diarrhea. Max Daily Amount: 4 Tablets. Dispense: 30 Tablet; Refill: 1  - C. DIFFICILE AG & TOXIN A/B; Future    8. Hospital discharge follow-up    - AK DISCHARGE MEDS RECONCILED W/ CURRENT OUTPATIENT MED LIST      Lab review: orders written for new lab studies as appropriate; see orders      I have discussed the diagnosis with the patient and the intended plan as seen in the above orders.   The patient has received an after-visit summary and questions were answered concerning future plans. I have discussed medication side effects and warnings with the patient as well. I have reviewed the plan of care with the patient, accepted their input and they are in agreement with the treatment goals.

## 2022-08-16 NOTE — PROGRESS NOTES
Mary Gant presents today for   Chief Complaint   Patient presents with    Hospital Follow Up       Is someone accompanying this pt? no    Is the patient using any DME equipment during OV? Yes a wheelchair and oxygen    Depression Screening:  3 most recent PHQ Screens 8/16/2022   Little interest or pleasure in doing things Not at all   Feeling down, depressed, irritable, or hopeless Not at all   Total Score PHQ 2 0       Learning Assessment:  Learning Assessment 3/28/2018   PRIMARY LEARNER Patient   HIGHEST LEVEL OF EDUCATION - PRIMARY LEARNER  DID NOT GRADUATE [facility] PRIMARY LEARNER NONE   CO-LEARNER CAREGIVER No   PRIMARY LANGUAGE ENGLISH   LEARNER PREFERENCE PRIMARY READING   ANSWERED BY Patient   RELATIONSHIP SELF       Abuse Screening:  Abuse Screening Questionnaire 4/21/2022   Do you ever feel afraid of your partner? N   Are you in a relationship with someone who physically or mentally threatens you? N   Is it safe for you to go home? Y       Fall Risk  Fall Risk Assessment, last 12 mths 8/16/2022   Able to walk? No   Fall in past 12 months? -   Do you feel unsteady? -   Are you worried about falling -   Is TUG test greater than 12 seconds? -   Is the gait abnormal? -   Number of falls in past 12 months -   Fall with injury? -       Health Maintenance reviewed and discussed and ordered per Provider. Health Maintenance Due   Topic Date Due    DTaP/Tdap/Td series (1 - Tdap) Never done    Shingrix Vaccine Age 49> (1 of 2) Never done    Pneumococcal 65+ years (2 - PCV) 10/18/2010    COVID-19 Vaccine (2 - Pfizer series) 07/30/2021   . Coordination of Care:  1. Have you been to the ER, urgent care clinic since your last visit? Hospitalized since your last visit? no    2. Have you seen or consulted any other health care providers outside of the [address] [place] [address] since your last visit? Include any pap smears or colon screening.  no      Last  Checked na  Last UDS Checked na  Last Pain contract signed: na    Rehab follow up

## 2022-08-18 NOTE — TELEPHONE ENCOUNTER
[facility] pharmacy called saying there is a problem with the Bumex . 5 medication directions that needs clarification. Sig says: \"Take 1 tab by mouth 2 times a day. Take 2 tabs in the morning and 1 tab in the late afternoon. \"     Please verify and re-prescribe when possible     Thank you

## 2022-08-22 NOTE — PROGRESS NOTES
Spoke with the patient's daughter Ms. Anai Galvan. She reports that he was in the hospital and is currently in a nursing facility. She will contact the office to schedule an appointment when he's discharged.